# Patient Record
Sex: MALE | Race: WHITE | NOT HISPANIC OR LATINO | Employment: FULL TIME | URBAN - NONMETROPOLITAN AREA
[De-identification: names, ages, dates, MRNs, and addresses within clinical notes are randomized per-mention and may not be internally consistent; named-entity substitution may affect disease eponyms.]

---

## 2018-06-27 LAB
ALBUMIN SERPL-MCNC: 2.2 G/DL (ref 3.5–5.2)
ALP BLD-CCNC: 53 U/L (ref 40–130)
ALT SERPL-CCNC: 17 U/L (ref 5–41)
ANION GAP SERPL CALCULATED.3IONS-SCNC: 15 MMOL/L (ref 7–19)
AST SERPL-CCNC: 24 U/L (ref 5–40)
BASOPHILS ABSOLUTE: 0.2 K/UL (ref 0–0.2)
BASOPHILS RELATIVE PERCENT: 2 % (ref 0–1)
BILIRUB SERPL-MCNC: 0.5 MG/DL (ref 0.2–1.2)
BUN BLDV-MCNC: 16 MG/DL (ref 6–20)
CALCIUM SERPL-MCNC: 8 MG/DL (ref 8.6–10)
CHLORIDE BLD-SCNC: 103 MMOL/L (ref 98–111)
CHOLESTEROL, TOTAL: 302 MG/DL (ref 160–199)
CO2: 23 MMOL/L (ref 22–29)
CREAT SERPL-MCNC: 0.7 MG/DL (ref 0.5–1.2)
EOSINOPHILS ABSOLUTE: 0.6 K/UL (ref 0–0.6)
EOSINOPHILS RELATIVE PERCENT: 7.8 % (ref 0–5)
GFR NON-AFRICAN AMERICAN: >60
GLUCOSE BLD-MCNC: 93 MG/DL (ref 74–109)
HCT VFR BLD CALC: 43 % (ref 42–52)
HDLC SERPL-MCNC: 44 MG/DL (ref 55–121)
HEMOGLOBIN: 13.7 G/DL (ref 14–18)
LDL CHOLESTEROL CALCULATED: 234 MG/DL
LYMPHOCYTES ABSOLUTE: 2.4 K/UL (ref 1.1–4.5)
LYMPHOCYTES RELATIVE PERCENT: 30.2 % (ref 20–40)
MCH RBC QN AUTO: 29.5 PG (ref 27–31)
MCHC RBC AUTO-ENTMCNC: 31.9 G/DL (ref 33–37)
MCV RBC AUTO: 92.7 FL (ref 80–94)
MONOCYTES ABSOLUTE: 0.5 K/UL (ref 0–0.9)
MONOCYTES RELATIVE PERCENT: 5.9 % (ref 0–10)
NEUTROPHILS ABSOLUTE: 4.2 K/UL (ref 1.5–7.5)
NEUTROPHILS RELATIVE PERCENT: 53.8 % (ref 50–65)
PDW BLD-RTO: 13.6 % (ref 11.5–14.5)
PLATELET # BLD: 288 K/UL (ref 130–400)
PMV BLD AUTO: 10.1 FL (ref 9.4–12.4)
POTASSIUM SERPL-SCNC: 3.8 MMOL/L (ref 3.5–5)
PROSTATE SPECIFIC ANTIGEN: 0.55 NG/ML (ref 0–4)
RBC # BLD: 4.64 M/UL (ref 4.7–6.1)
SODIUM BLD-SCNC: 141 MMOL/L (ref 136–145)
TOTAL PROTEIN: 5.7 G/DL (ref 6.6–8.7)
TRIGL SERPL-MCNC: 120 MG/DL (ref 0–149)
TSH SERPL DL<=0.05 MIU/L-ACNC: 10.93 UIU/ML (ref 0.27–4.2)
WBC # BLD: 7.9 K/UL (ref 4.8–10.8)

## 2018-07-30 LAB — TSH SERPL DL<=0.05 MIU/L-ACNC: 5.77 UIU/ML (ref 0.27–4.2)

## 2019-02-19 LAB
ALBUMIN SERPL-MCNC: 2.3 G/DL (ref 3.5–5.2)
ALP BLD-CCNC: 59 U/L (ref 40–130)
ALT SERPL-CCNC: 11 U/L (ref 5–41)
ANION GAP SERPL CALCULATED.3IONS-SCNC: 11 MMOL/L (ref 7–19)
AST SERPL-CCNC: 18 U/L (ref 5–40)
BASOPHILS ABSOLUTE: 0.2 K/UL (ref 0–0.2)
BASOPHILS RELATIVE PERCENT: 2 % (ref 0–1)
BILIRUB SERPL-MCNC: 0.4 MG/DL (ref 0.2–1.2)
BUN BLDV-MCNC: 23 MG/DL (ref 6–20)
CALCIUM SERPL-MCNC: 8.1 MG/DL (ref 8.6–10)
CHLORIDE BLD-SCNC: 106 MMOL/L (ref 98–111)
CHOLESTEROL, TOTAL: 269 MG/DL (ref 160–199)
CO2: 26 MMOL/L (ref 22–29)
CREAT SERPL-MCNC: 1.2 MG/DL (ref 0.5–1.2)
EOSINOPHILS ABSOLUTE: 0.6 K/UL (ref 0–0.6)
EOSINOPHILS RELATIVE PERCENT: 7.5 % (ref 0–5)
GFR NON-AFRICAN AMERICAN: >60
GLUCOSE BLD-MCNC: 103 MG/DL (ref 74–109)
HCT VFR BLD CALC: 34.1 % (ref 42–52)
HDLC SERPL-MCNC: 36 MG/DL (ref 55–121)
HEMOGLOBIN: 10.3 G/DL (ref 14–18)
LDL CHOLESTEROL CALCULATED: 212 MG/DL
LYMPHOCYTES ABSOLUTE: 1.8 K/UL (ref 1.1–4.5)
LYMPHOCYTES RELATIVE PERCENT: 22.8 % (ref 20–40)
MCH RBC QN AUTO: 29.2 PG (ref 27–31)
MCHC RBC AUTO-ENTMCNC: 30.2 G/DL (ref 33–37)
MCV RBC AUTO: 96.6 FL (ref 80–94)
MONOCYTES ABSOLUTE: 0.6 K/UL (ref 0–0.9)
MONOCYTES RELATIVE PERCENT: 7.9 % (ref 0–10)
NEUTROPHILS ABSOLUTE: 4.8 K/UL (ref 1.5–7.5)
NEUTROPHILS RELATIVE PERCENT: 59.4 % (ref 50–65)
PDW BLD-RTO: 13.9 % (ref 11.5–14.5)
PLATELET # BLD: 232 K/UL (ref 130–400)
PMV BLD AUTO: 11.5 FL (ref 9.4–12.4)
POTASSIUM SERPL-SCNC: 4.4 MMOL/L (ref 3.5–5)
RBC # BLD: 3.53 M/UL (ref 4.7–6.1)
SODIUM BLD-SCNC: 143 MMOL/L (ref 136–145)
TOTAL PROTEIN: 5.6 G/DL (ref 6.6–8.7)
TRIGL SERPL-MCNC: 104 MG/DL (ref 0–149)
TSH SERPL DL<=0.05 MIU/L-ACNC: 10.42 UIU/ML (ref 0.27–4.2)
WBC # BLD: 8 K/UL (ref 4.8–10.8)

## 2019-02-20 ENCOUNTER — TRANSCRIBE ORDERS (OUTPATIENT)
Dept: PHYSICAL THERAPY | Facility: HOSPITAL | Age: 59
End: 2019-02-20

## 2019-02-20 DIAGNOSIS — E78.41 ELEVATED LIPOPROTEIN(A): Primary | ICD-10-CM

## 2019-02-25 ENCOUNTER — HOSPITAL ENCOUNTER (OUTPATIENT)
Dept: PHYSICAL THERAPY | Facility: HOSPITAL | Age: 59
Setting detail: THERAPIES SERIES
Discharge: HOME | End: 2019-02-25

## 2019-02-25 DIAGNOSIS — I89.0 LYMPHEDEMA OF BOTH LOWER EXTREMITIES: Primary | ICD-10-CM

## 2019-02-25 PROCEDURE — 97162 PT EVAL MOD COMPLEX 30 MIN: CPT | Performed by: PHYSICAL THERAPIST

## 2019-02-25 NOTE — THERAPY EVALUATION
Physical Therapy Lymphedema Initial Evaluation  [facility]     Patient Name: Beni Fuchs  : 1960  MRN: [id number]  Today's Date: 2019      Visit Date: 2019    Visit Dx:    ICD-10-CM ICD-9-CM   1. Lymphedema of both lower extremities I89.0 457.1       There is no problem list on file for this patient.       Past Medical History:   Diagnosis Date   • Hypertension         Past Surgical History:   Procedure Laterality Date   • BACK SURGERY Right     L1-5 fusion   • JOINT REPLACEMENT Left        Visit Dx:    ICD-10-CM ICD-9-CM   1. Lymphedema of both lower extremities I89.0 457.1       Patient History     Row Name 19 0915             History    Chief Complaint  Swelling;Ulcer, wound or other skin conditions  -HR      Date Current Problem(s) Began  18  -HR      Brief Description of Current Complaint  He began having darkening of the skin on his lower legs and then progressive swelling early last year. He went to the doctor in 2018. He has had blisters and weeping from the legs and has been on antibiotics twice. He has gained about 100 pounds since 2018 without changing his eating habits.   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  general MD  -HR      How has patient tried to help current problem?  compression socks when flying  -HR         Pain     Pain Location  Leg  -HR      Pain at Present  0  -HR      Pain at Best  0  -HR      Pain Description  Other (Comment)  -HR      Pain Comments  He has residual numbness from spinal compression in both legs with the R more involved than the left. He reports having sensations of certain spots on  his L leg burning sometimes, but that is all.   -HR         Fall Risk Assessment    Any falls in the past year:  No  -HR         Services    Prior Rehab/Home Health Experiences  No  -HR      Are you currently receiving Home Health services  No  -HR      Do you plan to receive Home Health services in the near future  No  -HR         Daily Activities    Primary Language  English  -HR      How does patient learn best?  Reading  -HR      Teaching needs identified  Management of Condition  -HR      Barriers to learning  None  -HR      Explanation of Functional Status Problem  He is very limited in forward bending due to L1-5 being fused. He can't reach his lower legs or feet. He uses reachers and sock aids as well as long handled shoe horns and sponges  -HR         Safety    Are you being hurt, hit, or frightened by anyone at home or in your life?  No  -HR      Are you being neglected by a caregiver  No  -HR        User Key  (r) = Recorded By, (t) = Taken By, (c) = Cosigned By    Initials Name Provider Type    HR Alena Galan, PT, DPT, CLT-CHAVA Physical Therapist          Lymphedema     Row Name 02/25/19 0900             Subjective Pain    Able to rate subjective pain?  yes  -HR      Pre-Treatment Pain Level  0  -HR         Subjective Comments    Subjective Comments  He just about can't get the compression socks he has on because he can't bend over and the sock aide doesn't do near as well with the longer socks.   -HR         Lymphedema Assessment    Lymphedema Classification  RLE:;LLE:;stage 3 (Lymphostatic Elephantiasis)  -HR      Lymphedema Precautions  other (comment) completed round of antibiotics 7 days ago  -HR         Posture/Observations    Observations  Edema  -HR         General ROM    Head/Neck/Trunk  Trunk Flexion  -HR      GENERAL ROM COMMENTS  has difficulty with R knee flexion as increased edema has caused soft tissue block to the posterior knees'  -HR         Head/Neck/Trunk    Trunk Flexion  AROM  can reach only to knee level  -HR      Head/Neck/Trunk Comments  has lumbar fusions  -HR         Lymphedema Edema Assessment    Ptting Edema Category  By grade out of 3  -HR      Pitting Edema  + 2/3  -HR      Edema Assessment Comment  still has pitting in the lower legs which decreases towards the feet.  -HR         Skin Changes/Observations    Location/Assessment  Lower Extremity  -HR      Lower Extremity Conditions  bilateral:;scaly;inflamed;weeping;dry  -HR      Lower Extremity Color/Pigment  bilateral:;red;non-blanchable;hyperpigmented;fibrosis;brawny  -HR      Lower Extremity Skin Details  both lower legs had significant redness along the shins and blisters along the upper calf. The L distal thigh also had some blisters present.   -HR      Skin Observations Comment  Thickened, flaky, inflamed  -HR         Lymphedema Sensation    Lymphedema Sensation Reports  RLE:;numbness;LLE:  -HR         Lymphedema Measurements    Measurement Type(s)  Circumferential  -HR      Circumferential Areas  Lower extremities  -HR         BLE Circumferential (cm)    Measurement Location 1  BOT  -HR      Left 1  27 cm  -HR      Right 1  27.3 cm  -HR      Measurement Location 2  +10 from heel base  -HR      Left 2  32.5 cm  -HR      Right 2  32.3 cm  -HR      Measurement Location 3  +10  -HR      Left 3  32.5 cm  -HR      Right 3  34.9 cm  -HR      Measurement Location 4  +10  -HR      Left 4  42.9 cm  -HR      Right 4  47.1 cm  -HR      Measurement Location 5  +10  -HR      Left 5  52.5 cm  -HR      Right 5  54.4 cm  -HR      Measurement Location 6  +10  -HR      Left 6  57.2 cm  -HR      Right 6  58.2 cm  -HR      Measurement Location 7  +10  -HR      Left 7  67.1 cm  -HR      Right 7  66.3 cm  -HR      LLE Circumferential Total  311.7 cm  -HR      RLE Circumferential Total  320.5 cm  -HR         Manual Lymphatic Drainage    Manual Lymphatic Drainage Comments  Discussed this and will use in clinic. he will be limited in what he  can reach to perform any self massage. Will attempt to get him a Flexitouch pump  -HR         Compression/Skin Care    Compression/Skin Care Comments  Gave him info to order [place]arrow lower leg garments.   -HR        User Key  (r) = Recorded By, (t) = Taken By, (c) = Cosigned By    Initials Name Provider Type    HR Alena Galan, PT, DPT, TIA-CHAVA Physical Therapist                          Therapy Education  Education Details: treatment for lymphedema.   Given: Edema management  Program: New  How Provided: Verbal, Demonstration, Written  Provided to: Patient  Level of Understanding: Verbalized      Exercises     Row Name 02/25/19 0900             Subjective Comments    Subjective Comments  He just about can't get the compression socks he has on because he can't bend over and the sock aide doesn't do near as well with the longer socks.   -HR         Subjective Pain    Able to rate subjective pain?  yes  -HR      Pre-Treatment Pain Level  0  -HR        User Key  (r) = Recorded By, (t) = Taken By, (c) = Cosigned By    Initials Name Provider Type    HR Alena Galan, PT, DPT, BRINDA Physical Therapist                        PT OP Goals     Row Name 02/25/19 0915          PT Short Term Goals    STG Date to Achieve  03/11/19  -HR     STG 1  Patient will show a good understanding of the condition of lymphedema and its care.   -HR     STG 1 Progress  New  -HR     STG 2  Patient will have improvement in lower leg skin health with no further blistering.   -HR     STG 2 Progress  New  -HR     STG 3  Patient will be able to perform modified self massage and HEP for BLE lymphedema.  -HR     STG 3 Progress  New  -HR        Long Term Goals    LTG 1  Patient will be able to don and doff compression garments for BLE.  -HR     LTG 1 Progress  New  -HR     LTG 2  Patient will be independent with use of a home lymphedema pump.  -HR     LTG 2 Progress  New  -HR     LTG 3  Patient will have a decrease in size of each LE  by 10 cm or greater.   -HR     LTG 3 Progress  New  -HR     LTG 4  Patient will have no s/s infection.  -HR     LTG 4 Progress  New  -HR        Time Calculation    PT Goal Re-Cert Due Date  03/27/19  -HR       User Key  (r) = Recorded By, (t) = Taken By, (c) = Cosigned By    Initials Name Provider Type    HR Alena Galan, PT, DPT, CLT-CHAVA Physical Therapist          PT Assessment/Plan     Row Name 02/25/19 0915          PT Assessment    Functional Limitations  Decreased safety during functional activities;Limitations in functional capacity and performance;Limitation in home management  -HR     Impairments  Impaired lymphatic circulation;Edema;Impaired flexibility;Integumentary integrity;Impaired venous circulation;Impaired sensory integrity  -HR     Assessment Comments  He has had worsening edema in both legs over the past 9-12 months. He has been diagnosed with cellulitis and treated with antibiotics twice. His legs blister and weep and the skin is very hard and thick. He would benefit from CDT to address the BLE lymphedema. He is unfortunately limited in his ability to reach his lower legs and feet due to a previous lumbar fusion. This will limit his ability to perform self massage as he does live alone. He would be a great candidate for a lymphedema pump so we will pursue this. He is also going to order some inelastic garments for compression of his lower legs. We will monitor his skin condition and watch for signs of infection. He is in agreement with the plan and wants to proceed, Thank you for the referral as I think we can definitely help him.   -HR     Please refer to paper survey for additional self-reported information  Yes  -HR     Rehab Potential  Excellent  -HR     Patient/caregiver participated in establishment of treatment plan and goals  Yes  -HR     Patient would benefit from skilled therapy intervention  Yes  -HR        PT Plan    PT Frequency  2x/week  -HR     Predicted Duration of  Therapy Intervention (Therapy Eval)  6 weeks  -HR     Planned CPT's?  PT MANUAL THERAPY EA 15 MIN: 91375;PT THER PROC EA 15 MIN: 72781  -HR     PT Plan Comments  PT to see for CDT to BLE.  -HR       User Key  (r) = Recorded By, (t) = Taken By, (c) = Cosigned By    Initials Name Provider Type    HR Alena Galan, PT, DPT, CLT-CHAVA Physical Therapist                       Time Calculation:       Therapy Suggested Charges     Code   Minutes Charges    None           Therapy Charges for Today     Code Description Service Date Service Provider Modifiers Qty    41368769026 HC PT EVAL MOD COMPLEXITY 4 2/25/2019 Alena Galan, PT, DPT, CLT-CHAVA GP 1    02292539865 HC PT THER SUPP EA 15 MIN 2/25/2019 Alena Galan, PT, DPT, CLT-CHAVA GP 2                    Alena Galan, PT, DPT, CLT-CHAVA  2/25/2019

## 2019-02-27 ENCOUNTER — HOSPITAL ENCOUNTER (OUTPATIENT)
Dept: PHYSICAL THERAPY | Facility: HOSPITAL | Age: 59
Setting detail: THERAPIES SERIES
Discharge: HOME | End: 2019-02-27

## 2019-02-27 DIAGNOSIS — I89.0 LYMPHEDEMA OF BOTH LOWER EXTREMITIES: Primary | ICD-10-CM

## 2019-02-27 PROCEDURE — 97140 MANUAL THERAPY 1/> REGIONS: CPT | Performed by: PHYSICAL THERAPIST

## 2019-02-27 NOTE — THERAPY TREATMENT NOTE
Outpatient Physical Therapy Lymphedema Treatment Note  [facility]     Patient Name: Beni Fuchs  : 1960  MRN: [id number]  Today's Date: 2019        Visit Date: 2019    Visit Dx:    ICD-10-CM ICD-9-CM   1. Lymphedema of both lower extremities I89.0 457.1       There is no problem list on file for this patient.       Lymphedema     Row Name 19 1000             Subjective Pain    Able to rate subjective pain?  yes  -HR      Pre-Treatment Pain Level  1  -HR      Subjective Pain Comment  couple burning spots on R lateral leg  -HR         Subjective Comments    Subjective Comments  He ordered the compression and got some aquaphor.  -HR         Manual Lymphatic Drainage    Manual Lymphatic Drainage  initial sequence;opened regional lymph nodes;opened anastamoses;extremity treatment  -HR      Initial Sequence  short neck;abdomen;diaphragmatic breathing  -HR      Short Neck  had him practice hand placement  -HR      Diaphragmatic Breathing  X 5 with some manual resistance  -HR      Opened Regional Lymph Nodes  axillary;inguinal  -HR      Axillary  right;left  -HR      Axillary Comment  instructed him on self massage  -HR      Inguinal  right;left  -HR      Opened Anastamoses  inguino-axillary  -HR      Inguino-Axillary  right;left  -HR      Extremity Treatment  MLD to full limb  -HR      MLD to Full Limb  BLE  -HR      Manual Lymphatic Drainage Comments  both thighs are very dense and tight as well as the lower legs. Told him to get some compression pants that come past the knees to wear in conjunction with the lower leg garments that he has already ordered.   -HR         Compression/Skin Care    Compression/Skin Care Comments  He is going to look into getting compression pants and is waiting on his farrow garments to be delivered.   -HR        User Key  (r) = Recorded By, (t) = Taken By, (c) = Cosigned By    Initials Name Provider Type    HR Alena Galan, PT, DPT, CLT-CHAVA  Physical Therapist                        PT Assessment/Plan     Row Name 02/27/19 1000          PT Assessment    Assessment Comments  Initiated MLD today for BLEs. His thighs are more involved than I realized at evaluation and will need some compression as well. He has already ordered the garments for his lower legs and we discussed options for upper legs as well.   -HR        PT Plan    PT Plan Comments  Cont POC.   -HR       User Key  (r) = Recorded By, (t) = Taken By, (c) = Cosigned By    Initials Name Provider Type    Alena Shaw, PT, DPT, CLT-CHAVA Physical Therapist               Exercises     Row Name 02/27/19 1000             Subjective Comments    Subjective Comments  He ordered the compression and got some aquaphor.  -HR         Subjective Pain    Able to rate subjective pain?  yes  -HR      Pre-Treatment Pain Level  1  -HR      Subjective Pain Comment  couple burning spots on R lateral leg  -HR        User Key  (r) = Recorded By, (t) = Taken By, (c) = Cosigned By    Initials Name Provider Type    HR Alena Galan, PT, DPT, CLT-CHAVA Physical Therapist                        PT OP Goals     Row Name 02/27/19 1000          PT Short Term Goals    STG Date to Achieve  03/11/19  -HR     STG 1  Patient will show a good understanding of the condition of lymphedema and its care.   -HR     STG 1 Progress  Ongoing  -HR     STG 2  Patient will have improvement in lower leg skin health with no further blistering.   -HR     STG 2 Progress  Ongoing  -HR     STG 3  Patient will be able to perform modified self massage and HEP for BLE lymphedema.  -HR     STG 3 Progress  Ongoing  -HR        Long Term Goals    LTG 1  Patient will be able to don and doff compression garments for BLE.  -HR     LTG 1 Progress  New  -HR     LTG 2  Patient will be independent with use of a home lymphedema pump.  -HR     LTG 2 Progress  New  -HR     LTG 3  Patient will have a decrease in size of each LE by 10 cm or greater.    -HR     LTG 3 Progress  New  -HR     LTG 4  Patient will have no s/s infection.  -HR     LTG 4 Progress  New  -HR        Time Calculation    PT Goal Re-Cert Due Date  03/27/19  -HR       User Key  (r) = Recorded By, (t) = Taken By, (c) = Cosigned By    Initials Name Provider Type    HR Alena Galan, PT, DPT, CLT-CHAVA Physical Therapist          Therapy Education  Education Details: instructed him on self massage to trunk and what he can reach of LEs  Given: Edema management  Program: New  How Provided: Verbal, Demonstration  Provided to: Patient  Level of Understanding: Verbalized, Demonstrated              Time Calculation:       Therapy Suggested Charges     Code   Minutes Charges    None           Therapy Charges for Today     Code Description Service Date Service Provider Modifiers Qty    91953085935 HC PT MANUAL THERAPY EA 15 MIN 2/27/2019 Alena Galan, PT, DPT, CLSAMMY-CHAVA GP 5                    Alena Galan, PT, DPT, CLYEE  2/27/2019

## 2019-03-04 ENCOUNTER — HOSPITAL ENCOUNTER (OUTPATIENT)
Dept: PHYSICAL THERAPY | Facility: HOSPITAL | Age: 59
Setting detail: THERAPIES SERIES
Discharge: HOME | End: 2019-03-04

## 2019-03-04 DIAGNOSIS — I89.0 LYMPHEDEMA OF BOTH LOWER EXTREMITIES: Primary | ICD-10-CM

## 2019-03-04 PROCEDURE — 97140 MANUAL THERAPY 1/> REGIONS: CPT

## 2019-03-04 NOTE — THERAPY TREATMENT NOTE
Outpatient Physical Therapy Lymphedema Treatment Note  [facility]     Patient Name: Beni Fuchs  : 1960  MRN: [id number]  Today's Date: 3/4/2019        Visit Date: 2019    Visit Dx:    ICD-10-CM ICD-9-CM   1. Lymphedema of both lower extremities I89.0 457.1       There is no problem list on file for this patient.       Lymphedema     Row Name 19 0905             Subjective Pain    Able to rate subjective pain?  yes  -AL      Pre-Treatment Pain Level  0  -AL         Subjective Comments    Subjective Comments  He will occasionally have a burning sensation in both lets.  He can't get the inelastic garments on.  He is going to order some compression capris, he has compression shorts right now.  They fit but they don't go to the knee.  He was thinking about giving away the inelastic garments becasue he can't put them on.   -AL         Manual Lymphatic Drainage    Manual Lymphatic Drainage  initial sequence;opened regional lymph nodes;opened anastamoses;extremity treatment  -AL      Initial Sequence  short neck;abdomen;diaphragmatic breathing  -AL      Short Neck  had him practice hand placement  -AL      Diaphragmatic Breathing  x 10 with superficial abdominals.  -AL      Opened Regional Lymph Nodes  axillary;inguinal  -AL      Axillary  right;left  -AL      Inguinal  right;left  -AL      Opened Anastamoses  inguino-axillary  -AL      Inguino-Axillary  right;left  -AL      Extremity Treatment  MLD to full limb  -AL      MLD to Full Limb  BLE  -AL      Manual Lymphatic Drainage Comments  Extra time spent on the upper legs and calf areas.    -AL         Compression/Skin Care    Compression/Skin Care Comments  He will bring his inelastic garments and compression capris or shorts next treatment.   -AL        User Key  (r) = Recorded By, (t) = Taken By, (c) = Cosigned By    Initials Name Provider Type    Jud Olvera, PTA Physical Therapy Assistant                        PT Assessment/Plan      Row Name 03/04/19 0905          PT Assessment    Assessment Comments  He still has dense areas present in both upper and lower legs.  He was going to give the inelastic garments away since he can't don them himself, but I want him to bring them to his next treatment so I can put them  on for him.  I am hoping as we go through treatment he will be able to don and doff the inealstic garments.   -AL        PT Plan    PT Plan Comments  Will continue with POC.  -AL       User Key  (r) = Recorded By, (t) = Taken By, (c) = Cosigned By    Initials Name Provider Type    Jud Olvera PTA Physical Therapy Assistant               Exercises     Row Name 03/04/19 0905             Subjective Comments    Subjective Comments  He will occasionally have a burning sensation in both lets.  He can't get the inelastic garments on.  He is going to order some compression capris, he has compression shorts right now.  They fit but they don't go to the knee.  He was thinking about giving away the inelastic garments becasue he can't put them on.   -AL         Subjective Pain    Able to rate subjective pain?  yes  -AL      Pre-Treatment Pain Level  0  -AL        User Key  (r) = Recorded By, (t) = Taken By, (c) = Cosigned By    Initials Name Provider Type    Jud Olvera PTA Physical Therapy Assistant                        PT OP Goals     Row Name 03/04/19 0905          PT Short Term Goals    STG Date to Achieve  03/11/19  -AL     STG 1  Patient will show a good understanding of the condition of lymphedema and its care.   -AL     STG 1 Progress  Ongoing  -AL     STG 1 Progress Comments  Continue to educate  -AL     STG 2  Patient will have improvement in lower leg skin health with no further blistering.   -AL     STG 2 Progress  Ongoing  -AL     STG 3  Patient will be able to perform modified self massage and HEP for BLE lymphedema.  -AL     STG 3 Progress  Ongoing  -AL     STG 3 Progress Comments  He is working on modified self  MLD  -AL        Long Term Goals    LTG 1  Patient will be able to don and doff compression garments for BLE.  -AL     LTG 1 Progress  Ongoing  -AL     LTG 1 Progress Comments  He is not able to don the compression, he will bring his garments next treatment so I can put them on him.   -AL     LTG 2  Patient will be independent with use of a home lymphedema pump.  -AL     LTG 2 Progress  Ongoing  -AL     LTG 2 Progress Comments  He has a pump demo scheduled this week.   -AL     LTG 3  Patient will have a decrease in size of each LE by 10 cm or greater.   -AL     LTG 3 Progress  New  -AL     LTG 4  Patient will have no s/s infection.  -AL     LTG 4 Progress  New  -AL        Time Calculation    PT Goal Re-Cert Due Date  03/27/19  -AL       User Key  (r) = Recorded By, (t) = Taken By, (c) = Cosigned By    Initials Name Provider Type    Jud Olvera PTA Physical Therapy Assistant          Therapy Education  Education Details: Work on self MLD and HEP  Given: Edema management  Program: Reinforced, New  How Provided: Verbal, Demonstration, Written  Provided to: Patient  Level of Understanding: Verbalized              Time Calculation:   Start Time: 0905  Stop Time: 1025  Time Calculation (min): 80 min  Total Timed Code Minutes- PT: 80 minute(s)   Therapy Suggested Charges     Code   Minutes Charges    None           Therapy Charges for Today     Code Description Service Date Service Provider Modifiers Qty    03214828212 HC PT MANUAL THERAPY EA 15 MIN 3/4/2019 Jud Ojeda PTA GP 5                    Jud Ojeda PTA  3/4/2019

## 2019-03-11 ENCOUNTER — HOSPITAL ENCOUNTER (OUTPATIENT)
Dept: PHYSICAL THERAPY | Facility: HOSPITAL | Age: 59
Setting detail: THERAPIES SERIES
Discharge: HOME | End: 2019-03-11

## 2019-03-11 DIAGNOSIS — I89.0 LYMPHEDEMA OF BOTH LOWER EXTREMITIES: Primary | ICD-10-CM

## 2019-03-11 PROCEDURE — 97140 MANUAL THERAPY 1/> REGIONS: CPT | Performed by: PHYSICAL THERAPIST

## 2019-03-11 NOTE — THERAPY TREATMENT NOTE
Outpatient Physical Therapy Lymphedema Treatment Note  [facility]     Patient Name: Beni Fuchs  : 1960  MRN: [id number]  Today's Date: 3/11/2019        Visit Date: 2019    Visit Dx:    ICD-10-CM ICD-9-CM   1. Lymphedema of both lower extremities I89.0 457.1       There is no problem list on file for this patient.       Lymphedema     Row Name 19 0800             Subjective Pain    Able to rate subjective pain?  yes  -HR      Pre-Treatment Pain Level  2  -HR         Subjective Comments    Subjective Comments  His hips are really bothering him for some reason today. He also is sure he could get somebody at work to help him with the farrow garments if he can't get them on himself.   -HR         Manual Lymphatic Drainage    Manual Lymphatic Drainage  initial sequence;opened regional lymph nodes;opened anastamoses;extremity treatment  -HR      Initial Sequence  short neck;abdomen;diaphragmatic breathing  -HR      Short Neck  --  -HR      Diaphragmatic Breathing  x 10 with superficial abdominals.  -HR      Opened Regional Lymph Nodes  axillary;inguinal  -HR      Axillary  right;left  -HR      Inguinal  right;left  -HR      Opened Anastamoses  inguino-axillary  -HR      Inguino-Axillary  right;left  -HR      Extremity Treatment  MLD to full limb  -HR      MLD to Full Limb  BLE  -HR      Manual Lymphatic Drainage Comments  used double towel roll under each knee to perform MLD to posterior calf and distal thigh  -HR         Compression/Skin Care    Compression/Skin Care  skin care;compression garment  -HR      Skin Care  lotion applied  -HR      Compression Garment Comments  Donned the hybrid socks and Farrow Basic garments to each lower leg.   -HR      Compression/Skin Care Comments  Had him watch and ask any questions as I placed each basic garment.  -HR        User Key  (r) = Recorded By, (t) = Taken By, (c) = Cosigned By    Initials Name Provider Type    HR Alena Galan, PT, DPT,  BRINDA Physical Therapist                        PT Assessment/Plan     Row Name 03/11/19 0800          PT Assessment    Assessment Comments  He brought the Farrow basic garments today so I was able to put these on him after MLD. They felt very comfortable to him. Will see how his legs look and measure at his visit Wednesday.   -HR        PT Plan    PT Plan Comments  Cont POC. Measure next visit.   -HR       User Key  (r) = Recorded By, (t) = Taken By, (c) = Cosigned By    Initials Name Provider Type    HR Alena Galan, PT, DPT, BRINDA Physical Therapist               Exercises     Row Name 03/11/19 0800             Subjective Comments    Subjective Comments  His hips are really bothering him for some reason today. He also is sure he could get somebody at work to help him with the farrow garments if he can't get them on himself.   -HR         Subjective Pain    Able to rate subjective pain?  yes  -HR      Pre-Treatment Pain Level  2  -HR        User Key  (r) = Recorded By, (t) = Taken By, (c) = Cosigned By    Initials Name Provider Type    HR Alena Galan, PT, DPT, BRINDA Physical Therapist                        PT OP Goals     Row Name 03/11/19 0800          PT Short Term Goals    STG Date to Achieve  03/11/19  -HR     STG 1  Patient will show a good understanding of the condition of lymphedema and its care.   -HR     STG 1 Progress  Ongoing  -HR     STG 2  Patient will have improvement in lower leg skin health with no further blistering.   -HR     STG 2 Progress  Ongoing  -HR     STG 2 Progress Comments  had a little weeping from open blister on R lateral calf  -HR     STG 3  Patient will be able to perform modified self massage and HEP for BLE lymphedema.  -HR     STG 3 Progress  Ongoing  -HR     STG 3 Progress Comments  working on this.  -HR        Long Term Goals    LTG 1  Patient will be able to don and doff compression garments for BLE.  -HR     LTG 1 Progress  Ongoing  -HR     LTG 2   Patient will be independent with use of a home lymphedema pump.  -HR     LTG 2 Progress  Ongoing  -HR     LTG 3  Patient will have a decrease in size of each LE by 10 cm or greater.   -HR     LTG 3 Progress  Ongoing  -HR     LTG 3 Progress Comments  Will measure next visit to assess any improvement with use of the compression garments  -HR     LTG 4  Patient will have no s/s infection.  -HR     LTG 4 Progress  Ongoing  -HR     LTG 4 Progress Comments  Did have some blisters that had opened but no odor or signs of infection  -HR        Time Calculation    PT Goal Re-Cert Due Date  03/27/19  -HR       User Key  (r) = Recorded By, (t) = Taken By, (c) = Cosigned By    Initials Name Provider Type    HR Alena Galan, PT, DPT, BRINDA Physical Therapist          Therapy Education  Education Details: reviewed how to apply the farrow garments  Given: Bandaging/dressing change  Program: New  How Provided: Demonstration, Verbal  Provided to: Patient  Level of Understanding: Verbalized              Time Calculation:       Therapy Suggested Charges     Code   Minutes Charges    None           Therapy Charges for Today     Code Description Service Date Service Provider Modifiers Qty    52878520517  PT MANUAL THERAPY EA 15 MIN 3/11/2019 Alena Galan, PT, DPT, BRINDA GP 7                    Alena Galan PT, DPT, BRINDA  3/11/2019

## 2019-03-13 ENCOUNTER — HOSPITAL ENCOUNTER (OUTPATIENT)
Dept: PHYSICAL THERAPY | Facility: HOSPITAL | Age: 59
Setting detail: THERAPIES SERIES
Discharge: HOME | End: 2019-03-13

## 2019-03-13 DIAGNOSIS — I89.0 LYMPHEDEMA OF BOTH LOWER EXTREMITIES: Primary | ICD-10-CM

## 2019-03-13 PROCEDURE — 97140 MANUAL THERAPY 1/> REGIONS: CPT

## 2019-03-13 NOTE — THERAPY TREATMENT NOTE
Outpatient Physical Therapy Lymphedema Treatment Note  [facility]     Patient Name: Beni Fuchs  : 1960  MRN: [id number]  Today's Date: 3/13/2019        Visit Date: 2019    Visit Dx:    ICD-10-CM ICD-9-CM   1. Lymphedema of both lower extremities I89.0 457.1       There is no problem list on file for this patient.       Lymphedema     Row Name 19 0800             Subjective Pain    Able to rate subjective pain?  yes  -AL      Pre-Treatment Pain Level  0  -AL         Subjective Comments    Subjective Comments  He is feeling better today because it is warmer.  His legs looked about the same after he took the garments off, he took them off yesterday morning.  He has not had them of since then.  He used his grabber to get the garments off.  He has people lined up when he goes back to work to help him.  He will be off work a little longer.  He is still waiting to hear from [employer] about the pump.  -AL         Lymphedema Measurements    Measurement Type(s)  Circumferential  -AL      Circumferential Areas  Lower extremities  -AL         BLE Circumferential (cm)    Measurement Location 1  BOT  -AL      Left 1  26.7 cm  -AL      Right 1  25.3 cm  -AL      Measurement Location 2  +10 from heel base  -AL      Left 2  33.4 cm  -AL      Right 2  33.5 cm  -AL      Measurement Location 3  +10  -AL      Left 3  34.2 cm  -AL      Right 3  32.8 cm  -AL      Measurement Location 4  +10  -AL      Left 4  46.4 cm  -AL      Right 4  46.1 cm  -AL      Measurement Location 5  +10  -AL      Left 5  55.5 cm  -AL      Right 5  52.7 cm  -AL      Measurement Location 6  +10  -AL      Left 6  56.6 cm  -AL      Right 6  56.2 cm  -AL      Measurement Location 7  +10  -AL      Left 7  68.4 cm  -AL      Right 7  69.4 cm  -AL      LLE Circumferential Total  321.2 cm  -AL      RLE Circumferential Total  316 cm  -AL         Manual Lymphatic Drainage    Manual Lymphatic Drainage  initial sequence;opened regional  lymph nodes;opened anastamoses;extremity treatment  -AL      Initial Sequence  short neck;abdomen;diaphragmatic breathing  -AL      Diaphragmatic Breathing  x 10 with superficial abdominals.  -AL      Opened Regional Lymph Nodes  axillary;inguinal  -AL      Axillary  right;left  -AL      Inguinal  right;left  -AL      Opened Anastamoses  inguino-axillary  -AL      Inguino-Axillary  right;left  -AL      Extremity Treatment  MLD to full limb  -AL      MLD to Full Limb  BLE  -AL      Manual Lymphatic Drainage Comments  used double towel roll under each knee to perform MLD to posterior calf and distal thigh  -AL         Compression/Skin Care    Compression/Skin Care  skin care;compression garment  -AL      Skin Care  lotion applied  -AL      Compression Garment Comments  Donned the hybrid socks and Farrow Basic garments to each lower leg.   -AL        User Key  (r) = Recorded By, (t) = Taken By, (c) = Cosigned By    Initials Name Provider Type    Jud Olvera PTA Physical Therapy Assistant                        PT Assessment/Plan     Row Name 03/13/19 0800          PT Assessment    Assessment Comments  He likes the Farrow Basic garments for the lower legs, he is not able to put them on himself.  He will have help when he goes back to work, as he has friends that will help him don the garments.  He has had an increase in size in the L LE, a decrease in size R LE.  He still has dense tissue present each upper leg.  He is working on the MLD to just below the knees.  I am hopeful he will be able to get the Flexitouch pump to help further move the fluid out of the legs.   -AL        PT Plan    PT Plan Comments  Will continue with CDT.  -AL       User Key  (r) = Recorded By, (t) = Taken By, (c) = Cosigned By    Initials Name Provider Type    Jud Olvera PTA Physical Therapy Assistant               Exercises     Row Name 03/13/19 0800             Subjective Comments    Subjective Comments  He is feeling better  today because it is warmer.  His legs looked about the same after he took the garments off, he took them off yesterday morning.  He has not had them of since then.  He used his grabber to get the garments off.  He has people lined up when he goes back to work to help him.  He will be off work a little longer.  He is still waiting to hear from [employer] about the pump.  -AL         Subjective Pain    Able to rate subjective pain?  yes  -AL      Pre-Treatment Pain Level  0  -AL        User Key  (r) = Recorded By, (t) = Taken By, (c) = Cosigned By    Initials Name Provider Type    Jud Olvera, PTA Physical Therapy Assistant                        PT OP Goals     Row Name 03/13/19 0800          PT Short Term Goals    STG Date to Achieve  03/11/19  -AL     STG 1  Patient will show a good understanding of the condition of lymphedema and its care.   -AL     STG 1 Progress  Ongoing  -AL     STG 1 Progress Comments  Improving  -AL     STG 2  Patient will have improvement in lower leg skin health with no further blistering.   -AL     STG 2 Progress  Ongoing  -AL     STG 3  Patient will be able to perform modified self massage and HEP for BLE lymphedema.  -AL     STG 3 Progress  Ongoing  -AL     STG 3 Progress Comments  He is working on the MLD to just below the knee, he can't reach further than this  -AL        Long Term Goals    LTG 1  Patient will be able to don and doff compression garments for BLE.  -AL     LTG 1 Progress  Ongoing  -AL     LTG 1 Progress Comments  He is able to doff the compression, he will need help donning the compression.  -AL     LTG 2  Patient will be independent with use of a home lymphedema pump.  -AL     LTG 2 Progress  Ongoing  -AL     LTG 2 Progress Comments  Waiting for approval from insurance.  -AL     LTG 3  Patient will have a decrease in size of each LE by 10 cm or greater.   -AL     LTG 3 Progress  Ongoing  -AL     LTG 4  Patient will have no s/s infection.  -AL     LTG 4  Progress  Ongoing  -AL        Time Calculation    PT Goal Re-Cert Due Date  03/27/19  -AL       User Key  (r) = Recorded By, (t) = Taken By, (c) = Cosigned By    Initials Name Provider Type    Jud Olvera PTA Physical Therapy Assistant          Therapy Education  Education Details: Continue with CDT.  Given: Edema management  Program: Reinforced  How Provided: Verbal  Provided to: Patient  Level of Understanding: Verbalized              Time Calculation:   Start Time: 0800  Stop Time: 0925  Time Calculation (min): 85 min  Total Timed Code Minutes- PT: 85 minute(s)   Therapy Suggested Charges     Code   Minutes Charges    None           Therapy Charges for Today     Code Description Service Date Service Provider Modifiers Qty    90973752936 HC PT MANUAL THERAPY EA 15 MIN 3/13/2019 Jud Ojeda PTA GP 6                    Jud Ojeda PTA  3/13/2019

## 2019-03-18 ENCOUNTER — HOSPITAL ENCOUNTER (OUTPATIENT)
Dept: PHYSICAL THERAPY | Facility: HOSPITAL | Age: 59
Setting detail: THERAPIES SERIES
Discharge: HOME | End: 2019-03-18

## 2019-03-18 DIAGNOSIS — I89.0 LYMPHEDEMA OF BOTH LOWER EXTREMITIES: Primary | ICD-10-CM

## 2019-03-18 PROCEDURE — 97140 MANUAL THERAPY 1/> REGIONS: CPT

## 2019-03-18 NOTE — THERAPY TREATMENT NOTE
Outpatient Physical Therapy Lymphedema Treatment Note  [facility]     Patient Name: Beni Fuchs  : 1960  MRN: [id number]  Today's Date: 3/18/2019        Visit Date: 2019    Visit Dx:    ICD-10-CM ICD-9-CM   1. Lymphedema of both lower extremities I89.0 457.1       There is no problem list on file for this patient.       Lymphedema     Row Name 19 0755             Subjective Pain    Able to rate subjective pain?  yes  -AL      Pre-Treatment Pain Level  2  -AL         Subjective Comments    Subjective Comments  His Flexitouch should arrive at his home today.  He will go back to work tomorrow night.  No pain today.  -AL         Manual Lymphatic Drainage    Manual Lymphatic Drainage  initial sequence;opened regional lymph nodes;opened anastamoses;extremity treatment  -AL      Initial Sequence  short neck;abdomen;diaphragmatic breathing  -AL      Short Neck  had him practice hand placement  -AL      Diaphragmatic Breathing  x 10 with superficial abdominals.  -AL      Opened Regional Lymph Nodes  axillary;inguinal  -AL      Axillary  right;left  -AL      Inguinal  right;left  -AL      Opened Anastamoses  inguino-axillary  -AL      Inguino-Axillary  right;left  -AL      Extremity Treatment  MLD to full limb  -AL      MLD to Full Limb  BLE  -AL         Compression/Skin Care    Compression/Skin Care  skin care  -AL      Skin Care  lotion applied Hydorphor  -AL      Compression Garment Comments  Donned the hybrid socks and Farrow Basic garments to each lower leg.   -AL        User Key  (r) = Recorded By, (t) = Taken By, (c) = Cosigned By    Initials Name Provider Type    Jud Olvera, PTA Physical Therapy Assistant                        PT Assessment/Plan     Row Name 19 0755          PT Assessment    Assessment Comments  He will have his pump today, he does not know when he will be trained on it.  He goes back to work tomorrow night, we will see him for treatment Wednesday.  Will  measure Wednesday to see if there has been any change in the size of his legs.    -AL        PT Plan    PT Plan Comments  Continue with CDT.   -AL       User Key  (r) = Recorded By, (t) = Taken By, (c) = Cosigned By    Initials Name Provider Type    Jud Olvera PTA Physical Therapy Assistant               Exercises     Row Name 03/18/19 0755             Subjective Comments    Subjective Comments  His Flexitouch should arrive at his home today.  He will go back to work tomorrow night.  No pain today.  -AL         Subjective Pain    Able to rate subjective pain?  yes  -AL      Pre-Treatment Pain Level  2  -AL        User Key  (r) = Recorded By, (t) = Taken By, (c) = Cosigned By    Initials Name Provider Type    Jud Olvera PTA Physical Therapy Assistant                        PT OP Goals     Row Name 03/18/19 0755          PT Short Term Goals    STG Date to Achieve  03/11/19  -AL     STG 1  Patient will show a good understanding of the condition of lymphedema and its care.   -AL     STG 1 Progress  Ongoing  -AL     STG 1 Progress Comments  Continues to improve  -AL     STG 2  Patient will have improvement in lower leg skin health with no further blistering.   -AL     STG 2 Progress  Ongoing  -AL     STG 2 Progress Comments  Do drainage from legs today  -AL     STG 3  Patient will be able to perform modified self massage and HEP for BLE lymphedema.  -AL     STG 3 Progress  Ongoing  -AL     STG 3 Progress Comments  He is reaching what he can with the MLD.  -AL        Long Term Goals    LTG 1  Patient will be able to don and doff compression garments for BLE.  -AL     LTG 1 Progress  Ongoing  -AL     LTG 2  Patient will be independent with use of a home lymphedema pump.  -AL     LTG 2 Progress  Ongoing  -AL     LTG 2 Progress Comments  His pump will arrive at his home today.  -AL     LTG 3  Patient will have a decrease in size of each LE by 10 cm or greater.   -AL     LTG 3 Progress  Ongoing  -AL     LTG 4   Patient will have no s/s infection.  -AL     LTG 4 Progress  Ongoing  -AL        Time Calculation    PT Goal Re-Cert Due Date  03/27/19  -AL       User Key  (r) = Recorded By, (t) = Taken By, (c) = Cosigned By    Initials Name Provider Type    Jud Olvera PTA Physical Therapy Assistant          Therapy Education  Education Details: Continue with CDT, start using pump once he is trained.  Given: Mobility training  Program: New, Reinforced  How Provided: Verbal  Provided to: Patient  Level of Understanding: Verbalized              Time Calculation:   Start Time: 0755  Stop Time: 0920  Time Calculation (min): 85 min  Total Timed Code Minutes- PT: 85 minute(s)   Therapy Suggested Charges     Code   Minutes Charges    None           Therapy Charges for Today     Code Description Service Date Service Provider Modifiers Qty    66707774977 HC PT MANUAL THERAPY EA 15 MIN 3/18/2019 Jud Ojeda PTA GP 6                    Jud Ojeda PTA  3/18/2019

## 2019-03-20 ENCOUNTER — HOSPITAL ENCOUNTER (OUTPATIENT)
Dept: PHYSICAL THERAPY | Facility: HOSPITAL | Age: 59
Setting detail: THERAPIES SERIES
Discharge: HOME | End: 2019-03-20

## 2019-03-20 DIAGNOSIS — I89.0 LYMPHEDEMA OF BOTH LOWER EXTREMITIES: Primary | ICD-10-CM

## 2019-03-20 PROCEDURE — 97140 MANUAL THERAPY 1/> REGIONS: CPT

## 2019-03-20 NOTE — THERAPY TREATMENT NOTE
Outpatient Physical Therapy Lymphedema Treatment Note  [facility]     Patient Name: Beni Fuchs  : 1960  MRN: [id number]  Today's Date: 3/20/2019        Visit Date: 2019    Visit Dx:    ICD-10-CM ICD-9-CM   1. Lymphedema of both lower extremities I89.0 457.1       There is no problem list on file for this patient.       Lymphedema     Row Name 19 0800             Subjective Pain    Able to rate subjective pain?  yes  -AL      Pre-Treatment Pain Level  0  -AL         Subjective Comments    Subjective Comments  He states he wore the compression yesterday at work.  He had a friend at work put the compression on for him.  They were loose by the end of the night when they removed the compression.  He did not have the pain he nomally has in the legs with working.  The  is coming tomorrow to trian him on the Flexitouch.  His BP was 196/98 yesterday.  He is on more BP now, as he went to the Dr. yesterday.    -AL         Skin Changes/Observations    Skin Observations Comment  Has a 1 cm  1 cm blister on the R shin.  See Media tab for photo of the R lower leg.   -AL         Lymphedema Measurements    Measurement Type(s)  Circumferential  -AL      Circumferential Areas  Lower extremities  -AL         BLE Circumferential (cm)    Measurement Location 1  BOT  -AL      Left 1  26.5 cm  -AL      Right 1  26.5 cm  -AL      Measurement Location 2  +10 from heel base  -AL      Left 2  33.4 cm  -AL      Right 2  33.1 cm  -AL      Measurement Location 3  +10  -AL      Left 3  34.5 cm  -AL      Right 3  33.9 cm  -AL      Measurement Location 4  +10  -AL      Left 4  46.5 cm  -AL      Right 4  46.5 cm  -AL      Measurement Location 5  +10  -AL      Left 5  53.2 cm  -AL      Right 5  53 cm  -AL      Measurement Location 6  +10  -AL      Left 6  56.5 cm  -AL      Right 6  59.2 cm  -AL      Measurement Location 7  +10  -AL      Left 7  68.2 cm  -AL      Right 7  69.2 cm  -AL      LLE Circumferential  Total  318.8 cm  -AL      RLE Circumferential Total  321.4 cm  -AL         Manual Lymphatic Drainage    Manual Lymphatic Drainage  initial sequence;opened regional lymph nodes;opened anastamoses;extremity treatment  -AL      Initial Sequence  short neck;abdomen;diaphragmatic breathing  -AL      Short Neck  had him practice hand placement  -AL      Diaphragmatic Breathing  x 9 with superficial abdominals.  -AL      Opened Regional Lymph Nodes  axillary;inguinal  -AL      Axillary  right;left  -AL      Inguinal  right;left  -AL      Opened Anastamoses  inguino-axillary  -AL      Inguino-Axillary  right;left  -AL      Extremity Treatment  MLD to full limb  -AL      MLD to Full Limb  BLE  -AL      Manual Lymphatic Drainage Comments  used double towel roll under each knee to perform MLD to posterior calf and distal thigh  -AL         Compression/Skin Care    Compression/Skin Care  skin care  -AL      Skin Care  lotion applied Hydorphor  -AL      Compression Garment Comments  Donned the hybrid socks and Farrow Basic garments to each lower leg.   -AL      Compression/Skin Care Comments  I did apply a small Optiofoam dressing to the R shin over the blister.   -AL        User Key  (r) = Recorded By, (t) = Taken By, (c) = Cosigned By    Initials Name Provider Type    Jud Olvera PTA Physical Therapy Assistant                    [unfilled]    PT Assessment/Plan     Row Name 03/20/19 0800          PT Assessment    Assessment Comments  He has had an increasein size in the R LE since last week.  He will be trained on the pump tomorrow, he will have his friend help him with compression when he works.  He has a small blister R shin today.  Will measure the legs next treatment.   -AL        PT Plan    PT Plan Comments  Continue with CDT.   -AL       User Key  (r) = Recorded By, (t) = Taken By, (c) = Cosigned By    Initials Name Provider Type    Jud Olvera PTA Physical Therapy Assistant             Exercises      Row Name 03/20/19 0800             Subjective Comments    Subjective Comments  He states he wore the compression yesterday at work.  He had a friend at work put the compression on for him.  They were loose by the end of the night when they removed the compression.  He did not have the pain he nomally has in the legs with working.  The  is coming tomorrow to trian him on the Flexitouch.  His BP was 196/98 yesterday.  He is on more BP now, as he went to the Dr. yesterday.    -AL         Subjective Pain    Able to rate subjective pain?  yes  -AL      Pre-Treatment Pain Level  0  -AL        User Key  (r) = Recorded By, (t) = Taken By, (c) = Cosigned By    Initials Name Provider Type    Jud Olvera, PTA Physical Therapy Assistant                      PT OP Goals     Row Name 03/20/19 0800          PT Short Term Goals    STG Date to Achieve  03/11/19  -AL     STG 1  Patient will show a good understanding of the condition of lymphedema and its care.   -AL     STG 1 Progress  Ongoing  -AL     STG 1 Progress Comments  Improving  -AL     STG 2  Patient will have improvement in lower leg skin health with no further blistering.   -AL     STG 2 Progress  Ongoing  -AL     STG 2 Progress Comments  He has a 1 cm x 1 cm blister R shin  -AL     STG 3  Patient will be able to perform modified self massage and HEP for BLE lymphedema.  -AL     STG 3 Progress  Ongoing  -AL     STG 3 Progress Comments  He does the MLD to areas he can reach.  -AL        Long Term Goals    LTG 1  Patient will be able to don and doff compression garments for BLE.  -AL     LTG 1 Progress  Ongoing  -AL     LTG 1 Progress Comments  He has a friend helping with donning/doffing the compression  -AL     LTG 2  Patient will be independent with use of a home lymphedema pump.  -AL     LTG 2 Progress  Ongoing  -AL     LTG 2 Progress Comments  He will be trained on the pump tomorrow  -AL     LTG 3  Patient will have a decrease in size of each LE by 10 cm  or greater.   -AL     LTG 3 Progress  Ongoing  -AL     LTG 4  Patient will have no s/s infection.  -AL     LTG 4 Progress  Ongoing  -AL        Time Calculation    PT Goal Re-Cert Due Date  03/27/19  -AL       User Key  (r) = Recorded By, (t) = Taken By, (c) = Cosigned By    Initials Name Provider Type    Jud Olvera PTA Physical Therapy Assistant          Therapy Education  Education Details: Continue with CDT, tighten the Farrow Basic garments as needed when working, start using the Flexitouch once he is trained on it.   Given: Edema management  Program: Reinforced  How Provided: Verbal  Provided to: Patient  Level of Understanding: Verbalized              Time Calculation:   Start Time: 0800  Stop Time: 0925  Time Calculation (min): 85 min  Total Timed Code Minutes- PT: 85 minute(s)   Therapy Charges for Today     Code Description Service Date Service Provider Modifiers Qty    [id number] HC PT MANUAL THERAPY EA 15 MIN 3/20/2019 Jud Ojeda PTA GP 6                    Jud Ojeda PTA  3/20/2019

## 2019-03-25 ENCOUNTER — HOSPITAL ENCOUNTER (OUTPATIENT)
Dept: PHYSICAL THERAPY | Facility: HOSPITAL | Age: 59
Setting detail: THERAPIES SERIES
Discharge: HOME | End: 2019-03-25

## 2019-03-25 DIAGNOSIS — I89.0 LYMPHEDEMA OF BOTH LOWER EXTREMITIES: Primary | ICD-10-CM

## 2019-03-25 PROCEDURE — 97140 MANUAL THERAPY 1/> REGIONS: CPT

## 2019-03-25 NOTE — THERAPY TREATMENT NOTE
Outpatient Physical Therapy Lymphedema Treatment Note  [facility]     Patient Name: Beni Fuchs  : 1960  MRN: [id number]  Today's Date: 3/25/2019        Visit Date: 2019    Visit Dx:    ICD-10-CM ICD-9-CM   1. Lymphedema of both lower extremities I89.0 457.1       There is no problem list on file for this patient.       Lymphedema     Row Name 19 0755             Subjective Pain    Able to rate subjective pain?  yes  -AL      Pre-Treatment Pain Level  0  -AL         Subjective Comments    Subjective Comments  He does not have any pain right now, he did have some when working.  His pain in both knees and feet, he rates this pain when working over the weekend was 8/10.  He has used the pump a few times, after the first use he noticed he started having genital swelling, and still has it.   -AL         Manual Lymphatic Drainage    Manual Lymphatic Drainage  initial sequence;opened regional lymph nodes;opened anastamoses;extremity treatment  -AL      Initial Sequence  short neck;abdomen;diaphragmatic breathing  -AL      Short Neck  had him practice hand placement  -AL      Abdomen  deep  -AL      Diaphragmatic Breathing  x 9 with deep abdominals.  -AL      Opened Regional Lymph Nodes  axillary;inguinal  -AL      Axillary  right;left  -AL      Inguinal  right;left  -AL      Opened Anastamoses  inguino-axillary  -AL      Inguino-Axillary  right;left  -AL      Extremity Treatment  MLD to full limb  -AL      MLD to Full Limb  BLE  -AL      Manual Lymphatic Drainage Comments  used double towel roll under each knee to perform MLD to posterior calf and distal thigh. Instructed patient to spend extra time working MLD to the abdomen  -AL      Manual Therapy  Spent extra time on MLD to the Abdomen, also did MLD to the suprapubic area.    -AL         Compression/Skin Care    Compression/Skin Care  skin care  -AL      Skin Care  lotion applied Hydorphor  -AL      Compression Garment Comments  Donned  the hybrid socks and Farrow Basic garments to each lower leg.   -AL      Compression/Skin Care Comments  I did apply a small Optiofoam dressing to the R shin over the blister that has now popped.  Patient is going to order a pair of 5X Bioflect compression capris.  Instructed patient to adjust the straps on the shorts so they are angeled to go over the genital area.  Made a chip bag to go over the genitals under the compression shorts.  Also instructed patient to make sure he does not wear underwear that is tight in the groin area.    -AL        User Key  (r) = Recorded By, (t) = Taken By, (c) = Cosigned By    Initials Name Provider Type    Jud Olvera PTA Physical Therapy Assistant                    @[email or web address]@    PT Assessment/Plan     Row Name 03/25/19 4180          PT Assessment    Assessment Comments  Since patient has started using the Flexitouch, he has devolped an increase in genital swelling.  He is going to adjust the short straps on the Flexitouch  garment to cross over the genital area.  Will see how he does with the chip bag. Included MLD to the suprapubic area today as well as extra abdominal work.  He is planning on ordering compression capris.   -AL        PT Plan    PT Plan Comments  Continue with CDT, see how he responds to adjustments made with the pump garment and using the chip bag.   -AL       User Key  (r) = Recorded By, (t) = Taken By, (c) = Cosigned By    Initials Name Provider Type    Jud Olvera PTA Physical Therapy Assistant             Exercises     Row Name 03/25/19 7376             Subjective Comments    Subjective Comments  He does not have any pain right now, he did have some when working.  His pain in both knees and feet, he rates this pain when working over the weekend was 8/10.  He has used the pump a few times, after the first use he noticed he started having genital swelling, and still has it.   -AL         Subjective Pain    Able to rate subjective pain?   yes  -AL      Pre-Treatment Pain Level  0  -AL        User Key  (r) = Recorded By, (t) = Taken By, (c) = Cosigned By    Initials Name Provider Type    Jud Olvera PTA Physical Therapy Assistant                      PT OP Goals     Row Name 03/25/19 0755          PT Short Term Goals    STG Date to Achieve  03/11/19  -AL     STG 1  Patient will show a good understanding of the condition of lymphedema and its care.   -AL     STG 1 Progress  Ongoing  -AL     STG 1 Progress Comments  Continue to educate  -AL     STG 2  Patient will have improvement in lower leg skin health with no further blistering.   -AL     STG 2 Progress  Ongoing  -AL     STG 2 Progress Comments  Skin is dry today  -AL     STG 3  Patient will be able to perform modified self massage and HEP for BLE lymphedema.  -AL     STG 3 Progress  Ongoing  -AL     STG 3 Progress Comments  He is going to work more on clearing the abdomen  -AL        Long Term Goals    LTG 1  Patient will be able to don and doff compression garments for BLE.  -AL     LTG 1 Progress  Ongoing  -AL     LTG 1 Progress Comments  He has help from a friend  -AL     LTG 2  Patient will be independent with use of a home lymphedema pump.  -AL     LTG 2 Progress  Ongoing  -AL     LTG 3  Patient will have a decrease in size of each LE by 10 cm or greater.   -AL     LTG 3 Progress  Ongoing  -AL     LTG 4  Patient will have no s/s infection.  -AL     LTG 4 Progress  Ongoing  -AL        Time Calculation    PT Goal Re-Cert Due Date  03/27/19  -AL       User Key  (r) = Recorded By, (t) = Taken By, (c) = Cosigned By    Initials Name Provider Type    Jud Olvera PTA Physical Therapy Assistant          Therapy Education  Education Details: Work on CDT, adjust Flexitouch grunk garment to crossover the genitals.  Order compression capris  Given: Edema management  Program: Reinforced, Progressed, New  How Provided: Demonstration, Verbal  Provided to: Patient  Level of Understanding:  Verbalized              Time Calculation:   Start Time: 0755  Stop Time: 0925  Time Calculation (min): 90 min  Total Timed Code Minutes- PT: 90 minute(s)   Therapy Charges for Today     Code Description Service Date Service Provider Modifiers Qty    64099989456 HC PT MANUAL THERAPY EA 15 MIN 3/25/2019 Jud Ojeda, PTA GP 6                    Jud Ojeda PTA  3/25/2019

## 2019-03-27 ENCOUNTER — HOSPITAL ENCOUNTER (OUTPATIENT)
Dept: PHYSICAL THERAPY | Facility: HOSPITAL | Age: 59
Setting detail: THERAPIES SERIES
Discharge: HOME | End: 2019-03-27

## 2019-03-27 DIAGNOSIS — I89.0 LYMPHEDEMA OF BOTH LOWER EXTREMITIES: Primary | ICD-10-CM

## 2019-03-27 PROCEDURE — 97140 MANUAL THERAPY 1/> REGIONS: CPT | Performed by: PHYSICAL THERAPIST

## 2019-03-27 NOTE — THERAPY PROGRESS REPORT/RE-CERT
Outpatient Physical Therapy Lymphedema Progress Note  [facility]     Patient Name: Beni Fuchs  : 1960  MRN: [id number]  Today's Date: 3/27/2019        Visit Date: 2019    Visit Dx:    ICD-10-CM ICD-9-CM   1. Lymphedema of both lower extremities I89.0 457.1       There is no problem list on file for this patient.       Lymphedema     Row Name 19 0800             Subjective Pain    Able to rate subjective pain?  yes  -HR      Pre-Treatment Pain Level  0  -HR      Post-Treatment Pain Level  4  -HR      Subjective Pain Comment  hip pain after lying on table  -HR         Subjective Comments    Subjective Comments  He broke down crying multiple times today during the visit. He is having more swelling in the groin and more trouble getting around and is just really upset.   -HR         Skin Changes/Observations    Skin Observations Comment  Both lower legs are very flaky and red. There is a new blister that has come up on the R lower leg closer to the ankle. There are also 2 blisters on the L lateral distal leg.  -HR         Manual Lymphatic Drainage    Initial Sequence  short neck;abdomen;diaphragmatic breathing  -HR      Short Neck  extra time  -HR      Abdomen  superficial;deep  -HR      Abdomen Comment  Spent lots of extra time on abdomen. Did both superificial and deep with him taking as deep breaths as he could.   -HR      Diaphragmatic Breathing  X9  -HR      Opened Regional Lymph Nodes  axillary  -HR      Axillary  right;left  -HR      Opened Anastamoses  inguino-axillary  -HR      Inguino-Axillary  right;left  -HR      Manual Lymphatic Drainage Comments  Spent entire treatment on decongesting the trunk. I worked on the suprapubic area as well as the inguinals on both sides but I did not treat the legs as I didn't want to move any more fluid up to the groin. He is going to do the pump to the inguinals 2X/day and 1 leg per day to his comfort. He has ordered the compression leggings but  doesn't have them yet. He is having to wear a pad in his boxer shorts because he leaks urine when he is getting up and down and in and out of the car from the sheer compression of the tissues.   -HR         Compression/Skin Care    Compression/Skin Care  skin care  -HR      Skin Care  lotion applied  -HR      Compression/Skin Care Comments  I applied hydraphor to the skin on his lower legs but I did not place the compression garments.   -HR         L-Dex Bioimpedence Screening    L-Dex Measurement Extremity  RLE;LLE  -HR      L-Dex Patient Position  -- sitting  -HR      L-Dex LE Dominate Side  Right  -HR      L-Dex LE Pre Surgical Value  No  -HR      L-Dex LE Comment  HyDex score of 50.5; Total Body Water of 92.8L  -HR        User Key  (r) = Recorded By, (t) = Taken By, (c) = Cosigned By    Initials Name Provider Type    HR Alena Galan, PT, DPT, CLT-CHAVA Physical Therapist                    [unfilled]    PT Assessment/Plan     Row Name 03/27/19 0800          PT Assessment    Impairments  Edema;Integumentary integrity;Gait;Impaired flexibility;Pain  -HR     Assessment Comments  Mr. Fuchs is suffering terribly with the swelling.  He is doing what we have asked him to do and it is only moving fluid from the legs to the groin which is causing even more discomfort and embarassment. I checked his vitals and his BP was still very high at 195/88 but he stated he had not taken his AM pills yet. His temperature was 97.2 but he did have chills while in the room. My treatment today only focused on trying to decongest his trunk, abdomen and inguinal region to try to facilitate more relief in the groin.  Due to his symptoms and reporting it even makes it hard to take a deep breath because there is no room, I decided to use the SOZO device and test his fluid levels. This has been approved for tracking CHF patients and I wanted to see if the reading implied that he was having any undiagnosed CHF. It did not  show that. His ratio of intracellular and extracellular fluid was normal. The most significant results were that he has 92.8 Liters of total body water which is extremely high but coincides with the result showing he is 50 times more hydrated than a normal man his age. He has very little fat mass at 15.4% of his weight and in fact has 34% of his weight in skeletal muscle mass. His basal metabolic rate is 2318 cals/day which is very high as well. These results seem to strongly suggest that his kidneys are not excreting the fluid that they should be. He worked for 10 hours overnight, drinking water and coffee and did not urinate until he got home. He is on a diuretic 2X/day. He should be urinating much, much more than he is. I checked his latest lab results which show that his protein and albumin levels are quite low and have been since at least last June. This would support nephrotic syndrome as a possible cause of his lack of urination, large amount of edema and his HTN that has been so hard to control. I have contacted his primary physician's office and made him an appointment for tomorrow morning.   -HR        PT Plan    PT Frequency  2x/week  -HR     Predicted Duration of Therapy Intervention (Therapy Eval)  4 weeks  -HR     PT Plan Comments  Follow up on MD findings. Cont POC as indicated.   -HR       User Key  (r) = Recorded By, (t) = Taken By, (c) = Cosigned By    Initials Name Provider Type    HR Alena Galan, PT, DPT, CLT-CHAVA Physical Therapist             Exercises     Row Name 03/27/19 0800             Subjective Comments    Subjective Comments  He broke down crying multiple times today during the visit. He is having more swelling in the groin and more trouble getting around and is just really upset.   -HR         Subjective Pain    Able to rate subjective pain?  yes  -HR      Pre-Treatment Pain Level  0  -HR      Post-Treatment Pain Level  4  -HR      Subjective Pain Comment  hip pain after  lying on table  -HR        User Key  (r) = Recorded By, (t) = Taken By, (c) = Cosigned By    Initials Name Provider Type    HR Alena Galan, PT, DPT, CLYEE Physical Therapist                      PT OP Goals     Row Name 03/27/19 0800          PT Short Term Goals    STG Date to Achieve  03/11/19  -HR     STG 1  Patient will show a good understanding of the condition of lymphedema and its care.   -HR     STG 1 Progress  Ongoing  -HR     STG 2  Patient will have improvement in lower leg skin health with no further blistering.   -HR     STG 2 Progress  Ongoing  -HR     STG 3  Patient will be able to perform modified self massage and HEP for BLE lymphedema.  -HR     STG 3 Progress  Ongoing  -HR        Long Term Goals    LTG 1  Patient will be able to don and doff compression garments for BLE.  -HR     LTG 1 Progress  Ongoing  -HR     LTG 2  Patient will be independent with use of a home lymphedema pump.  -HR     LTG 2 Progress  Ongoing  -HR     LTG 3  Patient will have a decrease in size of each LE by 10 cm or greater.   -HR     LTG 3 Progress  Ongoing  -HR     LTG 4  Patient will have no s/s infection.  -HR     LTG 4 Progress  Ongoing  -HR        Time Calculation    PT Goal Re-Cert Due Date  04/26/19  -HR       User Key  (r) = Recorded By, (t) = Taken By, (c) = Cosigned By    Initials Name Provider Type    HR Alena Galan, PT, DPT, CLYEE Physical Therapist          Therapy Education  Education Details: use the pump to the inguinal and trunk more frequently. Only use on leg per his comfort.  Go to MD tomorrow.               Time Calculation:       Therapy Charges for Today     Code Description Service Date Service Provider Modifiers Qty    78556004578 HC PT MANUAL THERAPY EA 15 MIN 3/27/2019 Alena Galan, PT, DPT, CLT-CHAVA GP 6                    Alena Galan PT, DPT, CLSAMMY-CHAVA  3/27/2019

## 2019-03-28 LAB
ANION GAP SERPL CALCULATED.3IONS-SCNC: 14 MMOL/L (ref 7–19)
BASOPHILS ABSOLUTE: 0.1 K/UL (ref 0–0.2)
BASOPHILS RELATIVE PERCENT: 1.4 % (ref 0–1)
BUN BLDV-MCNC: 34 MG/DL (ref 6–20)
CALCIUM SERPL-MCNC: 7.6 MG/DL (ref 8.6–10)
CHLORIDE BLD-SCNC: 112 MMOL/L (ref 98–111)
CO2: 20 MMOL/L (ref 22–29)
CREAT SERPL-MCNC: 2.2 MG/DL (ref 0.5–1.2)
EOSINOPHILS ABSOLUTE: 0.7 K/UL (ref 0–0.6)
EOSINOPHILS RELATIVE PERCENT: 7.2 % (ref 0–5)
GFR NON-AFRICAN AMERICAN: 31
GLUCOSE BLD-MCNC: 104 MG/DL (ref 74–109)
HCT VFR BLD CALC: 35.7 % (ref 42–52)
HEMOGLOBIN: 10.7 G/DL (ref 14–18)
LYMPHOCYTES ABSOLUTE: 2.1 K/UL (ref 1.1–4.5)
LYMPHOCYTES RELATIVE PERCENT: 22.4 % (ref 20–40)
MCH RBC QN AUTO: 28.8 PG (ref 27–31)
MCHC RBC AUTO-ENTMCNC: 30 G/DL (ref 33–37)
MCV RBC AUTO: 96.2 FL (ref 80–94)
MONOCYTES ABSOLUTE: 0.6 K/UL (ref 0–0.9)
MONOCYTES RELATIVE PERCENT: 6 % (ref 0–10)
NEUTROPHILS ABSOLUTE: 5.9 K/UL (ref 1.5–7.5)
NEUTROPHILS RELATIVE PERCENT: 62.6 % (ref 50–65)
PDW BLD-RTO: 14.4 % (ref 11.5–14.5)
PLATELET # BLD: 321 K/UL (ref 130–400)
PMV BLD AUTO: 10.8 FL (ref 9.4–12.4)
POTASSIUM SERPL-SCNC: 3.9 MMOL/L (ref 3.5–5)
RBC # BLD: 3.71 M/UL (ref 4.7–6.1)
REASON FOR REJECTION: NORMAL
REJECTED TEST: NORMAL
SODIUM BLD-SCNC: 146 MMOL/L (ref 136–145)
TSH SERPL DL<=0.05 MIU/L-ACNC: 6.3 UIU/ML (ref 0.27–4.2)
WBC # BLD: 9.4 K/UL (ref 4.8–10.8)

## 2019-04-03 ENCOUNTER — HOSPITAL ENCOUNTER (OUTPATIENT)
Dept: PHYSICAL THERAPY | Facility: HOSPITAL | Age: 59
Setting detail: THERAPIES SERIES
Discharge: HOME | End: 2019-04-03

## 2019-04-03 DIAGNOSIS — I89.0 LYMPHEDEMA OF BOTH LOWER EXTREMITIES: Primary | ICD-10-CM

## 2019-04-03 PROCEDURE — 97140 MANUAL THERAPY 1/> REGIONS: CPT

## 2019-04-03 NOTE — THERAPY TREATMENT NOTE
Outpatient Physical Therapy Lymphedema Treatment Note  [facility]     Patient Name: Beni Fuchs  : 1960  MRN: [id number]  Today's Date: 4/3/2019        Visit Date: 2019    Visit Dx:    ICD-10-CM ICD-9-CM   1. Lymphedema of both lower extremities I89.0 457.1       There is no problem list on file for this patient.       Lymphedema     Row Name 19 0915             Subjective Pain    Able to rate subjective pain?  yes  -AL      Pre-Treatment Pain Level  0  -AL         Subjective Comments    Subjective Comments  After he has worked about 5 hours he starts having knee pain and also pain on the bottom of his feet.  He can sit and rest, this helps the knee pain but not the foot pain.  This is worse if he is standing still.  He states he has had very little improvement since the DrЕкатерина increased his diuretics.   He has not worn the Farrow Basic garments as much becuase he still has the swelling in the genital area, and he would rather have the swelling in the legs than in the genitals.  His Dr. has increased his diruetics and he is urinating more, but doesn't feel like the fluid is changing much in the legs, abdomen, or genitals.   He went from 1 mg to 1.5 mg twice a day.  He is using the Flexitouch pump and and has adjusted the thigh straps on the trunk garment over the genitals and he thinks this has helped. He now has the compression leggings as well.  He is using the chip bag over the genital area when he is at home.  The DrЕкатерина also increased patient's thyroid medication.  -AL         Manual Lymphatic Drainage    Manual Lymphatic Drainage  initial sequence;opened regional lymph nodes;opened anastamoses;extremity treatment  -AL      Initial Sequence  short neck;abdomen;diaphragmatic breathing  -AL      Short Neck  had him practice hand placement  -AL      Abdomen  deep  -AL      Abdomen Comment  Extra time spent on abdomen  -AL      Opened Regional Lymph Nodes  axillary;inguinal  -AL       Axillary  right;left  -AL      Inguinal  right;left  -AL      Opened Anastamoses  inguino-axillary  -AL      Inguino-Axillary  right;left  -AL      Extremity Treatment  MLD to full limb  -AL      MLD to Full Limb  B LE  -AL      Manual Lymphatic Drainage Comments  Brief amount of time doing the MLD to B LE, spent most of time doing the MLD to the abdomen and suprapubic areas.   -AL      Manual Therapy  Instructed patient to contact his DrЕкатерина about seeing a kidney specialist.   -AL         Compression/Skin Care    Compression/Skin Care  skin care  -AL      Skin Care  lotion applied Hydorphor  -AL      Compression/Skin Care Comments  I applied the Hybrid socks, did not apply the Farrow Basic wraps.   -AL        User Key  (r) = Recorded By, (t) = Taken By, (c) = Cosigned By    Initials Name Provider Type    Jud Olvera PTA Physical Therapy Assistant                    [unfilled]    PT Assessment/Plan     Row Name 04/03/19 0915          PT Assessment    Assessment Comments  Patient is going to check into seeing a kidney specialist.  He still presents with dense tissue in the abdomen, both thighs, and the suprapubic area.  He is still having genital swelling.  He is wearing the compression and has adjusted the Flexitouch garment so it covers the genital area.  Will measure next treatment.   -AL        PT Plan    PT Plan Comments  Will continue with POC.  -AL       User Key  (r) = Recorded By, (t) = Taken By, (c) = Cosigned By    Initials Name Provider Type    Jud Olvera PTA Physical Therapy Assistant             Exercises     Row Name 04/03/19 8063             Subjective Comments    Subjective Comments  After he has worked about 5 hours he starts having knee pain and also pain on the bottom of his feet.  He can sit and rest, this helps the knee pain but not the foot pain.  This is worse if he is standing still.  He states he has had very little improvement since the DrЕкатерина increased his diuretics.   He  has not worn the Farrow Basic garments as much becuase he still has the swelling in the genital area, and he would rather have the swelling in the legs than in the genitals.  His Dr. has increased his diruetics and he is urinating more, but doesn't feel like the fluid is changing much in the legs, abdomen, or genitals.   He went from 1 mg to 1.5 mg twice a day.  He is using the Flexitouch pump and and has adjusted the thigh straps on the trunk garment over the genitals and he thinks this has helped. He now has the compression leggings as well.  He is using the chip bag over the genital area when he is at home.  The DrЕкатерина also increased patient's thyroid medication.  -AL         Subjective Pain    Able to rate subjective pain?  yes  -AL      Pre-Treatment Pain Level  0  -AL        User Key  (r) = Recorded By, (t) = Taken By, (c) = Cosigned By    Initials Name Provider Type    Jud Olvera PTA Physical Therapy Assistant                      PT OP Goals     Row Name 04/03/19 0915          PT Short Term Goals    STG Date to Achieve  03/11/19  -AL     STG 1  Patient will show a good understanding of the condition of lymphedema and its care.   -AL     STG 1 Progress  Ongoing  -AL     STG 1 Progress Comments  Continue to educate during treatment.  -AL     STG 2  Patient will have improvement in lower leg skin health with no further blistering.   -AL     STG 2 Progress  Ongoing  -AL     STG 2 Progress Comments  Skin is dry, several small open areas on both lower legs.  -AL     STG 3  Patient will be able to perform modified self massage and HEP for BLE lymphedema.  -AL     STG 3 Progress  Ongoing  -AL        Long Term Goals    LTG 1  Patient will be able to don and doff compression garments for BLE.  -AL     LTG 1 Progress  Ongoing  -AL     LTG 1 Progress Comments  He is not wearing compression as much  -AL     LTG 2  Patient will be independent with use of a home lymphedema pump.  -AL     LTG 2 Progress  Ongoing  -AL      LTG 2 Progress Comments  He is using the pump and has adjusted it so the pump will work on the genital area.  -AL     LTG 3  Patient will have a decrease in size of each LE by 10 cm or greater.   -AL     LTG 3 Progress  Ongoing  -AL     LTG 4  Patient will have no s/s infection.  -AL     LTG 4 Progress  Ongoing  -AL        Time Calculation    PT Goal Re-Cert Due Date  04/26/19  -AL       User Key  (r) = Recorded By, (t) = Taken By, (c) = Cosigned By    Initials Name Provider Type    Jud Olvera PTA Physical Therapy Assistant          Therapy Education  Education Details: Check into seeng a kidney specialist.  Continue with Flexitouch.  Given: Edema management  Program: Reinforced, New  How Provided: Verbal  Provided to: Patient  Level of Understanding: Verbalized              Time Calculation:   Start Time: 0915  Stop Time: 1040  Time Calculation (min): 85 min  Total Timed Code Minutes- PT: 85 minute(s)   Therapy Charges for Today     Code Description Service Date Service Provider Modifiers Qty    [id number] HC PT MANUAL THERAPY EA 15 MIN 4/3/2019 Jud Ojeda PTA GP 6                    Ojeda, Jud, PTA, CLT-CHAVA  4/3/2019. 12:53 PM   4/3/2019

## 2019-04-05 ENCOUNTER — HOSPITAL ENCOUNTER (OUTPATIENT)
Dept: PHYSICAL THERAPY | Facility: HOSPITAL | Age: 59
Setting detail: THERAPIES SERIES
Discharge: HOME | End: 2019-04-05

## 2019-04-05 DIAGNOSIS — I89.0 LYMPHEDEMA OF BOTH LOWER EXTREMITIES: Primary | ICD-10-CM

## 2019-04-05 PROCEDURE — 97140 MANUAL THERAPY 1/> REGIONS: CPT

## 2019-04-05 NOTE — THERAPY TREATMENT NOTE
Outpatient Physical Therapy Lymphedema Treatment Note  [facility]     Patient Name: Beni Fuchs  : 1960  MRN: [id number]  Today's Date: 2019        Visit Date: 2019    Visit Dx:    ICD-10-CM ICD-9-CM   1. Lymphedema of both lower extremities I89.0 457.1       There is no problem list on file for this patient.       Lymphedema     Row Name 19 0915             Subjective Pain    Able to rate subjective pain?  yes  -AL      Pre-Treatment Pain Level  0  -AL         Subjective Comments    Subjective Comments  He states he fell yesterday when trying to move furniture.  He landed on his butt and hit his head.  He had a headache for a little while, but he is fine now.  He did speak with Dr. Hall about seeing a kidney specialist.  Dr. Hall's office is going to send a referral to a kidney speacialist, he is not sure which kidney Dr. he will see.  If he does not hear from a kidney specialist in tow weeks, he is to call Dr. Hall's office and let them know.   -AL         Lymphedema Measurements    Measurement Type(s)  Circumferential  -AL      Circumferential Areas  Lower extremities  -AL         BLE Circumferential (cm)    Measurement Location 1  BOT  -AL      Left 1  27.9 cm  -AL      Right 1  27.7 cm  -AL      Measurement Location 2  +10 from heel base  -AL      Left 2  36.4 cm  -AL      Right 2  32.1 cm  -AL      Measurement Location 3  +10  -AL      Left 3  33.7 cm  -AL      Right 3  32.5 cm  -AL      Measurement Location 4  +10  -AL      Left 4  46 cm  -AL      Right 4  42.9 cm  -AL      Measurement Location 5  +10  -AL      Left 5  55 cm  -AL      Right 5  51.5 cm  -AL      Measurement Location 6  +10  -AL      Left 6  57.5 cm  -AL      Right 6  56.5 cm  -AL      Measurement Location 7  +10  -AL      Left 7  69.5 cm  -AL      Right 7  69.5 cm  -AL      LLE Circumferential Total  326 cm  -AL      RLE Circumferential Total  312.7 cm  -AL         Manual Lymphatic Drainage    Manual  Lymphatic Drainage  initial sequence;opened regional lymph nodes;opened anastamoses;extremity treatment  -AL      Initial Sequence  short neck;abdomen;diaphragmatic breathing  -AL      Short Neck  had him practice hand placement  -AL      Abdomen  deep  -AL      Abdomen Comment  Extra time spent on abdomen  -AL      Diaphragmatic Breathing  x 9 with deep abdominals.  -AL      Opened Regional Lymph Nodes  axillary;inguinal  -AL      Axillary  right;left  -AL      Inguinal  right;left  -AL      Opened Anastamoses  inguino-axillary  -AL      Inguino-Axillary  right;left  -AL      Extremity Treatment  MLD to full limb  -AL      MLD to Full Limb  B LE  -AL      Manual Lymphatic Drainage Comments  Brief amount of time doing the MLD to B LE, spent most of time doing the MLD to the abdomen and suprapubic areas.   -AL         Compression/Skin Care    Compression/Skin Care  skin care  -AL      Skin Care  lotion applied Hydorphor  -AL      Compression/Skin Care Comments  Applied patient's diabetic socks.   -AL        User Key  (r) = Recorded By, (t) = Taken By, (c) = Cosigned By    Initials Name Provider Type    Jud Olvera PTA Physical Therapy Assistant                    [unfilled]    PT Assessment/Plan     Row Name 04/05/19 8209          PT Assessment    Assessment Comments  Patient has had a reduction in the R LE, and increase in size in the L LE.  He still presents with genital swelling.   He has contacted Dr. Hall's office and they have referred him to a kidney specialist.  He does not know which Dr. he is being referred to.  He is not wearing the compresson on the lower legs at this time.   -AL        PT Plan    PT Plan Comments  Will continue with POC.  -AL       User Key  (r) = Recorded By, (t) = Taken By, (c) = Cosigned By    Initials Name Provider Type    Jud Olvera PTA Physical Therapy Assistant             Exercises     Row Name 04/05/19 7259             Subjective Comments    Subjective  Comments  He states he fell yesterday when trying to move furniture.  He landed on his butt and hit his head.  He had a headache for a little while, but he is fine now.  He did speak with Dr. Hall about seeing a kidney specialist.  Dr. Hall's office is going to send a referral to a kidney speacialist, he is not sure which kidney Dr. he will see.  If he does not hear from a kidney specialist in tow weeks, he is to call Dr. Hall's office and let them know.   -AL         Subjective Pain    Able to rate subjective pain?  yes  -AL      Pre-Treatment Pain Level  0  -AL        User Key  (r) = Recorded By, (t) = Taken By, (c) = Cosigned By    Initials Name Provider Type    Jud Olvera, PTA Physical Therapy Assistant                      PT OP Goals     Row Name 04/05/19 0915          PT Short Term Goals    STG Date to Achieve  03/11/19  -AL     STG 1  Patient will show a good understanding of the condition of lymphedema and its care.   -AL     STG 1 Progress  Ongoing  -AL     STG 1 Progress Comments  Continues to improve  -AL     STG 2  Patient will have improvement in lower leg skin health with no further blistering.   -AL     STG 2 Progress  Ongoing  -AL     STG 2 Progress Comments  Some blistering still present.  -AL     STG 3  Patient will be able to perform modified self massage and HEP for BLE lymphedema.  -AL     STG 3 Progress  Ongoing  -AL        Long Term Goals    LTG 1  Patient will be able to don and doff compression garments for BLE.  -AL     LTG 1 Progress  Ongoing  -AL     LTG 2  Patient will be independent with use of a home lymphedema pump.  -AL     LTG 2 Progress  Ongoing  -AL     LTG 3  Patient will have a decrease in size of each LE by 10 cm or greater.   -AL     LTG 3 Progress  Ongoing  -AL     LTG 4  Patient will have no s/s infection.  -AL     LTG 4 Progress  Ongoing  -AL        Time Calculation    PT Goal Re-Cert Due Date  04/26/19  -AL       User Key  (r) = Recorded By, (t) = Taken By, (c) =  Cosigned By    Initials Name Provider Type    AL Jud Ojeda PTA Physical Therapy Assistant          Therapy Education  Education Details: Continue to use Flexitouch for the trunk.   Given: Edema management  Program: Reinforced  How Provided: Verbal  Provided to: Patient  Level of Understanding: Verbalized              Time Calculation:   Start Time: 0915  Stop Time: 1040  Time Calculation (min): 85 min  Total Timed Code Minutes- PT: 85 minute(s)   Therapy Charges for Today     Code Description Service Date Service Provider Modifiers Qty    22304997854 HC PT MANUAL THERAPY EA 15 MIN 4/5/2019 Jud Ojeda PTA GP 6                    Ojeda, Jud, PTA, CLT-CHAVA  4/5/2019. 3:36 PM    4/5/2019

## 2019-04-10 ENCOUNTER — TELEPHONE (OUTPATIENT)
Dept: PHYSICAL THERAPY | Facility: HOSPITAL | Age: 59
End: 2019-04-10

## 2019-04-10 ENCOUNTER — HOSPITAL ENCOUNTER (OUTPATIENT)
Dept: PHYSICAL THERAPY | Facility: HOSPITAL | Age: 59
Setting detail: THERAPIES SERIES
Discharge: HOME | End: 2019-04-10

## 2019-04-10 ENCOUNTER — HOSPITAL ENCOUNTER (EMERGENCY)
Facility: HOSPITAL | Age: 59
Discharge: HOME | End: 2019-04-10
Admitting: EMERGENCY MEDICINE

## 2019-04-10 VITALS
BODY MASS INDEX: 45.1 KG/M2 | OXYGEN SATURATION: 95 % | RESPIRATION RATE: 18 BRPM | HEIGHT: 70 IN | TEMPERATURE: 97.4 F | HEART RATE: 89 BPM | DIASTOLIC BLOOD PRESSURE: 71 MMHG | SYSTOLIC BLOOD PRESSURE: 202 MMHG | WEIGHT: 315 LBS

## 2019-04-10 DIAGNOSIS — I89.0 LYMPHEDEMA: Primary | ICD-10-CM

## 2019-04-10 DIAGNOSIS — I10 HYPERTENSION, UNSPECIFIED TYPE: ICD-10-CM

## 2019-04-10 LAB
ALBUMIN SERPL-MCNC: 2.8 G/DL (ref 3.5–5)
ALBUMIN/GLOB SERPL: 1 G/DL (ref 1.1–2.5)
ALP SERPL-CCNC: 84 U/L (ref 24–120)
ALT SERPL W P-5'-P-CCNC: 18 U/L (ref 0–54)
ANION GAP SERPL CALCULATED.3IONS-SCNC: 4 MMOL/L (ref 4–13)
AST SERPL-CCNC: 27 U/L (ref 7–45)
BACTERIA UR QL AUTO: ABNORMAL /HPF
BASOPHILS # BLD AUTO: 0.13 10*3/MM3 (ref 0–0.2)
BASOPHILS NFR BLD AUTO: 1.2 % (ref 0–2)
BILIRUB SERPL-MCNC: 0.4 MG/DL (ref 0.1–1)
BILIRUB UR QL STRIP: NEGATIVE
BUN BLD-MCNC: 18 MG/DL (ref 5–21)
BUN/CREAT SERPL: 12.8 (ref 7–25)
CALCIUM SPEC-SCNC: 7.9 MG/DL (ref 8.4–10.4)
CHLORIDE SERPL-SCNC: 107 MMOL/L (ref 98–110)
CLARITY UR: CLEAR
CO2 SERPL-SCNC: 30 MMOL/L (ref 24–31)
COLOR UR: YELLOW
CREAT BLD-MCNC: 1.41 MG/DL (ref 0.5–1.4)
DEPRECATED RDW RBC AUTO: 47.3 FL (ref 40–54)
EOSINOPHIL # BLD AUTO: 0.48 10*3/MM3 (ref 0–0.7)
EOSINOPHIL NFR BLD AUTO: 4.5 % (ref 0–4)
ERYTHROCYTE [DISTWIDTH] IN BLOOD BY AUTOMATED COUNT: 14.3 % (ref 12–15)
GFR SERPL CREATININE-BSD FRML MDRD: 51 ML/MIN/1.73
GLOBULIN UR ELPH-MCNC: 2.9 GM/DL
GLUCOSE BLD-MCNC: 99 MG/DL (ref 70–100)
GLUCOSE UR STRIP-MCNC: ABNORMAL MG/DL
HCT VFR BLD AUTO: 33.9 % (ref 40–52)
HGB BLD-MCNC: 10.8 G/DL (ref 14–18)
HGB UR QL STRIP.AUTO: ABNORMAL
HYALINE CASTS UR QL AUTO: ABNORMAL /LPF
IMM GRANULOCYTES # BLD AUTO: 0.04 10*3/MM3 (ref 0–0.05)
IMM GRANULOCYTES NFR BLD AUTO: 0.4 % (ref 0–5)
KETONES UR QL STRIP: NEGATIVE
LEUKOCYTE ESTERASE UR QL STRIP.AUTO: NEGATIVE
LYMPHOCYTES # BLD AUTO: 1.56 10*3/MM3 (ref 0.72–4.86)
LYMPHOCYTES NFR BLD AUTO: 14.5 % (ref 15–45)
MCH RBC QN AUTO: 28.7 PG (ref 28–32)
MCHC RBC AUTO-ENTMCNC: 31.9 G/DL (ref 33–36)
MCV RBC AUTO: 90.2 FL (ref 82–95)
MONOCYTES # BLD AUTO: 0.67 10*3/MM3 (ref 0.19–1.3)
MONOCYTES NFR BLD AUTO: 6.2 % (ref 4–12)
NEUTROPHILS # BLD AUTO: 7.86 10*3/MM3 (ref 1.87–8.4)
NEUTROPHILS NFR BLD AUTO: 73.2 % (ref 39–78)
NITRITE UR QL STRIP: NEGATIVE
NRBC BLD AUTO-RTO: 0 /100 WBC (ref 0–0)
NT-PROBNP SERPL-MCNC: 1830 PG/ML (ref 0–900)
PH UR STRIP.AUTO: 8 [PH] (ref 5–8)
PLATELET # BLD AUTO: 351 10*3/MM3 (ref 130–400)
PMV BLD AUTO: 10.1 FL (ref 6–12)
POTASSIUM BLD-SCNC: 4.5 MMOL/L (ref 3.5–5.3)
PROT SERPL-MCNC: 5.7 G/DL (ref 6.3–8.7)
PROT UR QL STRIP: ABNORMAL
RBC # BLD AUTO: 3.76 10*6/MM3 (ref 4.8–5.9)
RBC # UR: ABNORMAL /HPF
REF LAB TEST METHOD: ABNORMAL
SODIUM BLD-SCNC: 141 MMOL/L (ref 135–145)
SP GR UR STRIP: 1.03 (ref 1–1.03)
SQUAMOUS #/AREA URNS HPF: ABNORMAL /HPF
TROPONIN I SERPL-MCNC: <0.012 NG/ML (ref 0–0.03)
UROBILINOGEN UR QL STRIP: ABNORMAL
WBC NRBC COR # BLD: 10.74 10*3/MM3 (ref 4.8–10.8)
WBC UR QL AUTO: ABNORMAL /HPF

## 2019-04-10 PROCEDURE — 93010 ELECTROCARDIOGRAM REPORT: CPT | Performed by: INTERNAL MEDICINE

## 2019-04-10 PROCEDURE — 81001 URINALYSIS AUTO W/SCOPE: CPT | Performed by: NURSE PRACTITIONER

## 2019-04-10 PROCEDURE — 80053 COMPREHEN METABOLIC PANEL: CPT | Performed by: NURSE PRACTITIONER

## 2019-04-10 PROCEDURE — 87150 DNA/RNA AMPLIFIED PROBE: CPT | Performed by: NURSE PRACTITIONER

## 2019-04-10 PROCEDURE — 83880 ASSAY OF NATRIURETIC PEPTIDE: CPT | Performed by: NURSE PRACTITIONER

## 2019-04-10 PROCEDURE — 85025 COMPLETE CBC W/AUTO DIFF WBC: CPT | Performed by: NURSE PRACTITIONER

## 2019-04-10 PROCEDURE — 93005 ELECTROCARDIOGRAM TRACING: CPT | Performed by: NURSE PRACTITIONER

## 2019-04-10 PROCEDURE — 36415 COLL VENOUS BLD VENIPUNCTURE: CPT | Performed by: NURSE PRACTITIONER

## 2019-04-10 PROCEDURE — 87040 BLOOD CULTURE FOR BACTERIA: CPT | Performed by: NURSE PRACTITIONER

## 2019-04-10 PROCEDURE — 99284 EMERGENCY DEPT VISIT MOD MDM: CPT

## 2019-04-10 PROCEDURE — 84484 ASSAY OF TROPONIN QUANT: CPT | Performed by: NURSE PRACTITIONER

## 2019-04-10 RX ORDER — CLONIDINE HYDROCHLORIDE 0.1 MG/1
0.1 TABLET ORAL ONCE
Status: COMPLETED | OUTPATIENT
Start: 2019-04-10 | End: 2019-04-10

## 2019-04-10 RX ORDER — LISINOPRIL 40 MG/1
40 TABLET ORAL 2 TIMES DAILY
COMMUNITY
End: 2019-12-17

## 2019-04-10 RX ORDER — CLONIDINE HYDROCHLORIDE 0.2 MG/1
0.2 TABLET ORAL 2 TIMES DAILY
COMMUNITY

## 2019-04-10 RX ORDER — CEPHALEXIN 500 MG/1
500 CAPSULE ORAL 3 TIMES DAILY
Qty: 30 CAPSULE | Refills: 0 | Status: SHIPPED | OUTPATIENT
Start: 2019-04-10 | End: 2019-04-20

## 2019-04-10 RX ORDER — METOPROLOL TARTRATE 50 MG/1
50 TABLET, FILM COATED ORAL 2 TIMES DAILY
COMMUNITY
End: 2019-12-17

## 2019-04-10 RX ORDER — LEVOTHYROXINE SODIUM 88 UG/1
88 TABLET ORAL DAILY
COMMUNITY

## 2019-04-10 RX ORDER — BUMETANIDE 1 MG/1
1.5 TABLET ORAL 2 TIMES DAILY
COMMUNITY
End: 2019-12-17

## 2019-04-10 RX ORDER — HYDRALAZINE HYDROCHLORIDE 50 MG/1
100 TABLET, FILM COATED ORAL 2 TIMES DAILY
COMMUNITY

## 2019-04-10 RX ADMIN — CLONIDINE HYDROCHLORIDE 0.1 MG: 0.1 TABLET ORAL at 13:51

## 2019-04-10 RX ADMIN — CLONIDINE HYDROCHLORIDE 0.1 MG: 0.1 TABLET ORAL at 14:47

## 2019-04-10 NOTE — ED NOTES
Patient reports that this is a normal blood pressure for him and that he does not feel symptomatic. Patient reports that he takes several medications for blood pressure treatment and this is his normal.      Armand Tom RN  04/10/19 6425

## 2019-04-10 NOTE — ED NOTES
Lab contacted transferred to phlebotomy. Request that labs be collected due to patient being a difficult stick. IV in place. Waiting for blood collection.      Armand Tom RN  04/10/19 1100

## 2019-04-10 NOTE — THERAPY TREATMENT NOTE
Outpatient Physical Therapy Lymphedema Treatment Note  [facility]     Patient Name: Beni Fuchs  : 1960  MRN: [id number]  Today's Date: 4/10/2019        Visit Date: 04/10/2019    Visit Dx:    ICD-10-CM ICD-9-CM   1. ERRONEOUS ENCOUNTER--DISREGARD         There is no problem list on file for this patient.       Lymphedema     Row Name 04/10/19 1000 04/10/19 0915          Subjective Comments    Subjective Comments  --  -AL  --  -AL        Manual Lymphatic Drainage    Manual Therapy  --  --  -AL       User Key  (r) = Recorded By, (t) = Taken By, (c) = Cosigned By    Initials Name Provider Type    Yonatan Olveraa HEATHER, PTA, CLT-CHAVA Physical Therapy Assistant                    @[facility]@    PT Assessment/Plan     Row Name 04/10/19 0915          PT Assessment    Assessment Comments  --  -AL        PT Plan    PT Plan Comments  --  -AL       User Key  (r) = Recorded By, (t) = Taken By, (c) = Cosigned By    Initials Name Provider Type    Jud Olvera, PTA, CLT-CHAVA Physical Therapy Assistant             Exercises     Row Name 04/10/19 1000 04/10/19 0915          Subjective Comments    Subjective Comments  --  -AL  --  -AL       User Key  (r) = Recorded By, (t) = Taken By, (c) = Cosigned By    Initials Name Provider Type    TANIKA Ojeda Jud P, PTA, CLT-CHAVA Physical Therapy Assistant                                         Time Calculation:                     Jud Ojeda PTA, CLT-CHAVA  4/10/2019

## 2019-04-10 NOTE — ED PROVIDER NOTES
Subjective   Patient is a 58-year-old male presents emergency department with complaints of lower extremity swelling and weeping.  Patient has chronic lymphedema and is currently being treated at the cancer center for lymphedema.  He states 2 days ago he began having worsening swelling and drainage from his legs bilaterally.  He had noted blistering and sloughing of the skin.  Patient has had no recorded fevers.  He states while at work on Friday he noted itching to his legs and began scratching them.  He states by the end of his work shift his pant pants legs were soaking wet from the drainage to his lower extremities.  He went to lymphedema clinic today and was instructed to come to the emergency department for further evaluation and treatment.        Lower Extremity Issue   Location:  Leg  Leg location:  R leg and L leg  Pain details:     Severity:  Severe    Onset quality:  Gradual    Progression:  Worsening  Worsened by:  Nothing  Associated symptoms: swelling    Associated symptoms: no back pain, no fever and no neck pain    Associated symptoms comment:  Weeping  Risk factors: obesity        Review of Systems   Constitutional: Negative for fever.   HENT: Negative for congestion.    Respiratory: Negative for shortness of breath.    Cardiovascular: Positive for leg swelling. Negative for chest pain.   Gastrointestinal: Negative for abdominal pain, constipation, diarrhea and vomiting.   Musculoskeletal: Negative for back pain, gait problem, joint swelling, myalgias, neck pain and neck stiffness.   Skin: Negative for rash and wound.   All other systems reviewed and are negative.      Past Medical History:   Diagnosis Date   • A-fib (CMS/HCC)    • Cellulitis of both lower extremities 2018   • Elevated cholesterol    • Hypertension    • Neuropathy        No Known Allergies    Past Surgical History:   Procedure Laterality Date   • BACK SURGERY Right 2011    L1-5 fusion   • JOINT REPLACEMENT Left 2008       History  reviewed. No pertinent family history.    Social History     Socioeconomic History   • Marital status: Single     Spouse name: Not on file   • Number of children: Not on file   • Years of education: Not on file   • Highest education level: Not on file   Tobacco Use   • Smoking status: Never Smoker   Substance and Sexual Activity   • Alcohol use: No     Frequency: Never           Objective   Physical Exam   Constitutional: He is oriented to person, place, and time. He appears well-developed and well-nourished. No distress.   HENT:   Head: Normocephalic and atraumatic.   Right Ear: External ear normal.   Left Ear: External ear normal.   Nose: Nose normal.   Mouth/Throat: Oropharynx is clear and moist.   Eyes: Conjunctivae and EOM are normal. Pupils are equal, round, and reactive to light. No scleral icterus.   Neck: Normal range of motion. Neck supple. No JVD present. No thyromegaly present.   Cardiovascular: Normal rate, regular rhythm, normal heart sounds and intact distal pulses.   No murmur heard.  Pulmonary/Chest: Effort normal and breath sounds normal. No respiratory distress. He has no wheezes. He has no rales. He exhibits no tenderness.   Abdominal: Soft. Bowel sounds are normal. He exhibits no distension and no mass. There is no tenderness. There is no rebound and no guarding.   Genitourinary:   Genitourinary Comments: Jennifer swelling noted   Musculoskeletal: Normal range of motion. He exhibits edema and tenderness.   Gross swelling to the lower extremities bilaterally with blistering and weeping noted, skin sloughed on posterior aspects of the calves bilaterally; neurovascular intact   Lymphadenopathy:     He has no cervical adenopathy.   Neurological: He is alert and oriented to person, place, and time. He has normal reflexes. No cranial nerve deficit. Coordination normal.   Skin: Skin is warm. Capillary refill takes less than 2 seconds. No rash noted. He is not diaphoretic. No erythema. No pallor.    Psychiatric: He has a normal mood and affect. His behavior is normal. Judgment and thought content normal.   Nursing note and vitals reviewed.      Procedures           ED Course dr carrasco assessed patient. He was placed in kerlix wraps and ace bandages. We will prescribe keflex and have f/u with pcp as well as lymphedema clinic.                   MDM  Number of Diagnoses or Management Options  Hypertension, unspecified type: new and requires workup  Lymphedema: new and requires workup     Amount and/or Complexity of Data Reviewed  Clinical lab tests: ordered and reviewed  Decide to obtain previous medical records or to obtain history from someone other than the patient: yes  Discuss the patient with other providers: yes    Risk of Complications, Morbidity, and/or Mortality  Presenting problems: moderate  Diagnostic procedures: moderate  Management options: moderate    Patient Progress  Patient progress: improved        Final diagnoses:   Lymphedema   Hypertension, unspecified type            Indira Flowers, APRN  04/11/19 0781

## 2019-04-10 NOTE — ED NOTES
Patient is a 58 year male that presents to ER with complaints of edema to groin and below. Patient states that he has been treated at the lymphoedema clinic. Patient reports increased swelling to lower extremities with weeping.      Armand Tom RN  04/10/19 0033

## 2019-04-10 NOTE — TELEPHONE ENCOUNTER
Patient came in today with weeping from both legs.  The lower legs of the pants were soaked from the legs weeping.  I sent patient to the Emergency Department.  No lymphedema treatment was completed today.

## 2019-04-11 ENCOUNTER — HOSPITAL ENCOUNTER (EMERGENCY)
Facility: HOSPITAL | Age: 59
End: 2019-04-11

## 2019-04-11 LAB — BACTERIA BLD CULT: ABNORMAL

## 2019-04-12 ENCOUNTER — HOSPITAL ENCOUNTER (OUTPATIENT)
Dept: PHYSICAL THERAPY | Facility: HOSPITAL | Age: 59
Setting detail: THERAPIES SERIES
Discharge: HOME | End: 2019-04-12

## 2019-04-12 ENCOUNTER — TELEPHONE (OUTPATIENT)
Dept: EMERGENCY DEPT | Facility: HOSPITAL | Age: 59
End: 2019-04-12

## 2019-04-12 DIAGNOSIS — I89.0 LYMPHEDEMA OF BOTH LOWER EXTREMITIES: Primary | ICD-10-CM

## 2019-04-12 PROCEDURE — 97140 MANUAL THERAPY 1/> REGIONS: CPT | Performed by: PHYSICAL THERAPIST

## 2019-04-12 NOTE — THERAPY TREATMENT NOTE
Outpatient Physical Therapy Lymphedema Treatment Note  [facility]     Patient Name: Beni Fuchs  : 1960  MRN: [id number]  Today's Date: 2019        Visit Date: 2019    Visit Dx:    ICD-10-CM ICD-9-CM   1. Lymphedema of both lower extremities I89.0 457.1       There is no problem list on file for this patient.       Lymphedema     Row Name 19 1000             Subjective Pain    Able to rate subjective pain?  yes  -HR      Pre-Treatment Pain Level  0  -HR      Subjective Pain Comment  He has pain with pressure on the back of his calves.   -HR         Subjective Comments    Subjective Comments  He has still been weeping from his legs but it seems to be a bit better. He has not heard anything else from his ER visit or about an appointment with a kidney doctor.   -HR         Manual Lymphatic Drainage    Manual Lymphatic Drainage  initial sequence;opened regional lymph nodes;opened anastamoses;extremity treatment  -HR      Initial Sequence  short neck;abdomen;diaphragmatic breathing  -HR      Short Neck  had him practice hand placement  -HR      Abdomen  deep  -HR      Abdomen Comment  Extra time spent on abdomen  -HR      Opened Regional Lymph Nodes  axillary;inguinal  -HR      Axillary  right;left  -HR      Inguinal  right;left  -HR      Opened Anastamoses  inguino-axillary  -HR      Inguino-Axillary  right;left  -HR      Extremity Treatment  MLD to proximal limb only  -HR      MLD to Proximal Limb Only  BLE  -HR      Manual Lymphatic Drainage Comments  I did not do MLD to lower legs.  -HR         Compression/Skin Care    Compression/Skin Care Comments  I applied a non-adherent bandage and then ABD pad to the calf on each leg and secured with kerlix and flex-net.   -HR        User Key  (r) = Recorded By, (t) = Taken By, (c) = Cosigned By    Initials Name Provider Type    HR Alena Galan, PT, DPT, CLT-CHAVA Physical Therapist                    [unfilled]    PT  Assessment/Plan     Row Name 04/12/19 1000          PT Assessment    Assessment Comments  His lower legs are in better shape today than they were 2 days ago when we had him go to ER. Multiple very abnormal lab results are noted, but he was only given an antibiotic prior to being discharged. His BP continues to be uncontrollable and high. He has not heard anything about an appointment to see a kidney specialist yet. I don't think that treatment with massage and compression is going to be beneficial as I feel there is a systemic cause to his swelling that has to be addressed medically.   -HR        PT Plan    PT Plan Comments  Contact MD. Mitchell 1X/wk to assess and treat as indicated.   -HR       User Key  (r) = Recorded By, (t) = Taken By, (c) = Cosigned By    Initials Name Provider Type    Alena Shaw, PT, DPT, CLT-CHAVA Physical Therapist             Exercises     Row Name 04/12/19 1000             Subjective Comments    Subjective Comments  He has still been weeping from his legs but it seems to be a bit better. He has not heard anything else from his ER visit or about an appointment with a kidney doctor.   -HR         Subjective Pain    Able to rate subjective pain?  yes  -HR      Pre-Treatment Pain Level  0  -HR      Subjective Pain Comment  He has pain with pressure on the back of his calves.   -HR        User Key  (r) = Recorded By, (t) = Taken By, (c) = Cosigned By    Initials Name Provider Type    Alena Shaw, PT, DPT, CLT-CHAVA Physical Therapist                      PT OP Goals     Row Name 04/12/19 1000          PT Short Term Goals    STG 1  Patient will show a good understanding of the condition of lymphedema and its care.   -HR     STG 1 Progress  Ongoing  -HR     STG 2  Patient will have improvement in lower leg skin health with no further blistering.   -HR     STG 2 Progress  Ongoing  -HR     STG 2 Progress Comments  He had a huge decline with constant weeping requiring ER visit  4/10/19. He has been on antibiotic since then and the legs are slightly better but still weeping and has some open areas.   -HR     STG 3  Patient will be able to perform modified self massage and HEP for BLE lymphedema.  -HR     STG 3 Progress  Ongoing  -HR        Long Term Goals    LTG 1  Patient will be able to don and doff compression garments for BLE.  -HR     LTG 1 Progress  Ongoing  -HR     LTG 1 Progress Comments  He has help donning them and he can get them off.   -HR     LTG 2  Patient will be independent with use of a home lymphedema pump.  -HR     LTG 2 Progress  Ongoing  -HR     LTG 3  Patient will have a decrease in size of each LE by 10 cm or greater.   -HR     LTG 3 Progress  Ongoing  -HR     LTG 4  Patient will have no s/s infection.  -HR     LTG 4 Progress  Ongoing  -HR     LTG 4 Progress Comments  He still has open, excoriated tissue to the posterior lower legs that is weeping. The anterior legs have lumps and bumps, but most of the skin is intact. He has a macerated blister near the ankle on each leg.   -HR        Time Calculation    PT Goal Re-Cert Due Date  04/26/19  -HR       User Key  (r) = Recorded By, (t) = Taken By, (c) = Cosigned By    Initials Name Provider Type    HR Alena Galan, PT, DPSAMMY, BRINDA Physical Therapist          Therapy Education  Education Details: As the legs heal, and until he sees kidney doctor, he doesn't need to use the pump or compression              Time Calculation:       Therapy Charges for Today     Code Description Service Date Service Provider Modifiers Qty    31307433355 HC PT MANUAL THERAPY EA 15 MIN 4/12/2019 Alena Galan, PT, DPT, CLYEE GP 4                    Alena Galan PT, DPSAMMY, BRINDA  4/12/2019

## 2019-04-15 LAB
BACTERIA SPEC AEROBE CULT: ABNORMAL
BACTERIA SPEC AEROBE CULT: NORMAL
GRAM STN SPEC: ABNORMAL
ISOLATED FROM: ABNORMAL

## 2019-04-17 ENCOUNTER — HOSPITAL ENCOUNTER (OUTPATIENT)
Dept: PHYSICAL THERAPY | Facility: HOSPITAL | Age: 59
Setting detail: THERAPIES SERIES
Discharge: HOME | End: 2019-04-17

## 2019-04-17 DIAGNOSIS — I89.0 LYMPHEDEMA OF BOTH LOWER EXTREMITIES: Primary | ICD-10-CM

## 2019-04-17 PROCEDURE — 97140 MANUAL THERAPY 1/> REGIONS: CPT

## 2019-04-17 NOTE — THERAPY TREATMENT NOTE
Outpatient Physical Therapy Lymphedema Treatment Note  [facility]     Patient Name: Beni Fuchs  : 1960  MRN: [id number]  Today's Date: 2019        Visit Date: 2019    Visit Dx:    ICD-10-CM ICD-9-CM   1. Lymphedema of both lower extremities I89.0 457.1       There is no problem list on file for this patient.       Lymphedema     Row Name 19 0915             Subjective Pain    Able to rate subjective pain?  yes  -AL      Pre-Treatment Pain Level  0  -AL         Subjective Comments    Subjective Comments  He is weeping again both calves, not as bad as last week.  He has pain with the weeping that is on his pants, and sticks to the back of his legs.  He has an appointment with Dr. Downing on May 2nd at 3:00 pm.  He is still on the antibiotic, he has a couple a few days left for this.  The swelling in the genitals is starting to decrease.   -AL         Lymphedema Measurements    Measurement Type(s)  Circumferential  -AL      Circumferential Areas  Lower extremities  -AL         BLE Circumferential (cm)    Measurement Location 1  BOT  -AL      Left 1  25.4 cm  -AL      Right 1  25 cm  -AL      Measurement Location 2  +10 from heel base  -AL      Left 2  31.7 cm  -AL      Right 2  32.6 cm  -AL      Measurement Location 3  +10  -AL      Left 3  37.4 cm  -AL      Right 3  33.7 cm  -AL      Measurement Location 4  +10  -AL      Left 4  50 cm  -AL      Right 4  46.6 cm  -AL      Measurement Location 5  +10  -AL      Left 5  57 cm  -AL      Right 5  53.7 cm  -AL      Measurement Location 6  +10  -AL      Left 6  62.7 cm  -AL      Right 6  58.5 cm  -AL      Measurement Location 7  +10  -AL      Left 7  70.6 cm  -AL      Right 7  69.4 cm  -AL      LLE Circumferential Total  334.8 cm  -AL      RLE Circumferential Total  319.5 cm  -AL         Manual Lymphatic Drainage    Manual Lymphatic Drainage  initial sequence;opened regional lymph nodes;opened anastamoses;extremity treatment  -AL       Initial Sequence  short neck;abdomen;diaphragmatic breathing  -AL      Short Neck  had him practice hand placement  -AL      Abdomen  deep  -AL      Abdomen Comment  Extra time spent on abdomen  -AL      Opened Regional Lymph Nodes  axillary;inguinal  -AL      Axillary  right;left  -AL      Inguinal  right;left  -AL      Opened Anastamoses  inguino-axillary  -AL      Inguino-Axillary  right;left  -AL      Extremity Treatment  MLD to proximal limb only  -AL      MLD to Proximal Limb Only  BLE  -AL      Manual Lymphatic Drainage Comments  I did not do MLD to lower legs.  -AL         Compression/Skin Care    Compression/Skin Care Comments  I applied an Optiofoam bandage and then ABD pad to the calf on each leg and secured with kerlex.  -AL        User Key  (r) = Recorded By, (t) = Taken By, (c) = Cosigned By    Initials Name Provider Type    Jud Olvera PTA, CLT-LANA Physical Therapy Assistant                        PT Assessment/Plan     Row Name 04/17/19 0915          PT Assessment    Assessment Comments   Patient has open areas both lower legs, the calf area on both lower legs are weeping.  He has had an increase in size of both LE's, he does state the genital area is still swollen, but has decreased in size.  He will see a kidney specialist is 2 weeks.   -AL        PT Plan    PT Plan Comments  Will continue with treatment 1 x per week.   -AL       User Key  (r) = Recorded By, (t) = Taken By, (c) = Cosigned By    Initials Name Provider Type    Jud Olvera PTABRINDA Physical Therapy Assistant             Exercises     Row Name 04/17/19 0915             Subjective Comments    Subjective Comments  He is weeping again both calves, not as bad as last week.  He has pain with the weeping that is on his pants, and sticks to the back of his legs.  He has an appointment with Dr. Downing on May 2nd at 3:00 pm.  He is still on the antibiotic, he has a couple a few days left for this.  The swelling in the  genitals is starting to decrease.   -AL         Subjective Pain    Able to rate subjective pain?  yes  -AL      Pre-Treatment Pain Level  0  -AL         Total Minutes    22613 - PT Manual Therapy Minutes  80  -AL        User Key  (r) = Recorded By, (t) = Taken By, (c) = Cosigned By    Initials Name Provider Type    Yonatan Olveratimo ROMERO, PTA, CLT-CHAVA Physical Therapy Assistant                     Manual Rx (last 36 hours)      Manual Treatments     Row Name 04/17/19 0915             Total Minutes    30194 - PT Manual Therapy Minutes  80  -AL        User Key  (r) = Recorded By, (t) = Taken By, (c) = Cosigned By    Initials Name Provider Type    Jud Olvera HEATHER, PTA, CLT-CHAVA Physical Therapy Assistant          PT OP Goals     Row Name 04/17/19 0915          PT Short Term Goals    STG 1  Patient will show a good understanding of the condition of lymphedema and its care.   -AL     STG 1 Progress  Ongoing  -AL     STG 2  Patient will have improvement in lower leg skin health with no further blistering.   -AL     STG 2 Progress  Ongoing  -AL     STG 2 Progress Comments  He has several open areas both lower legs  -AL     STG 3  Patient will be able to perform modified self massage and HEP for BLE lymphedema.  -AL     STG 3 Progress  Ongoing  -AL        Long Term Goals    LTG 1  Patient will be able to don and doff compression garments for BLE.  -AL     LTG 1 Progress  Ongoing  -AL     LTG 2  Patient will be independent with use of a home lymphedema pump.  -AL     LTG 2 Progress  Ongoing  -AL     LTG 3  Patient will have a decrease in size of each LE by 10 cm or greater.   -AL     LTG 3 Progress  Ongoing  -AL     LTG 3 Progress Comments  He has had an increase in size of both LE's  -AL     LTG 4  Patient will have no s/s infection.  -AL     LTG 4 Progress  Ongoing  -AL        Time Calculation    PT Goal Re-Cert Due Date  04/26/19  -AL       User Key  (r) = Recorded By, (t) = Taken By, (c) = Cosigned By    Initials Name  Provider Type    AL Jud Ojeda PTA, CLT-LANA Physical Therapy Assistant          Therapy Education  Education Details: Do not use pump or compression at this time.  Given: Edema management  Program: Reinforced  How Provided: Verbal  Provided to: Patient  Level of Understanding: Verbalized              Time Calculation:   Start Time: 0915  Stop Time: 1035  Time Calculation (min): 80 min  Total Timed Code Minutes- PT: 80 minute(s)   Therapy Charges for Today     Code Description Service Date Service Provider Modifiers Qty    [id number]  PT MANUAL THERAPY EA 15 MIN 4/17/2019 Jud Ojeda PTA, CLT-LANA GP 5                    Jud Ojeda PTA, CLT-LANA  4/17/2019

## 2019-04-24 ENCOUNTER — HOSPITAL ENCOUNTER (OUTPATIENT)
Dept: PHYSICAL THERAPY | Facility: HOSPITAL | Age: 59
Setting detail: THERAPIES SERIES
Discharge: HOME | End: 2019-04-24

## 2019-04-24 DIAGNOSIS — I89.0 LYMPHEDEMA OF BOTH LOWER EXTREMITIES: Primary | ICD-10-CM

## 2019-04-24 PROCEDURE — 97140 MANUAL THERAPY 1/> REGIONS: CPT

## 2019-04-24 NOTE — THERAPY PROGRESS REPORT/RE-CERT
Outpatient Physical Therapy Lymphedema Treatment Note  [facility]     Patient Name: Beni Fuchs  : 1960  MRN: [id number]  Today's Date: 2019        Visit Date: 2019    Visit Dx:    ICD-10-CM ICD-9-CM   1. Lymphedema of both lower extremities I89.0 457.1       There is no problem list on file for this patient.       Lymphedema     Row Name 19 0915             Subjective Pain    Able to rate subjective pain?  yes  -AL      Pre-Treatment Pain Level  0  -AL         Subjective Comments    Subjective Comments  He has an ache in the legs below the knee and the feet ache.  He is able to sit down as he needs to at work.  He is still having weeping from the calf area and also the front of the lower leg.  He states the abdomen and the genital area is down in size, the legs are still tight and swollen.  He is using the pump on the abdomen and genital area, he is not using the compression garments or the pumps on the legs.  He has had an increase urine out put, but not as much as when he first takes his lasix.   He is finished with the antibiotic, tomorrow he sees Dr. Hall.   -AL         Lymphedema Measurements    Measurement Type(s)  Circumferential  -AL      Circumferential Areas  Lower extremities  -AL         BLE Circumferential (cm)    Measurement Location 1  BOT  -AL      Left 1  27.3 cm  -AL      Right 1  27.7 cm  -AL      Measurement Location 2  +10 from heel base  -AL      Left 2  33.7 cm  -AL      Right 2  33.6 cm  -AL      Measurement Location 3  +10  -AL      Left 3  38.4 cm  -AL      Right 3  33.8 cm  -AL      Measurement Location 4  +10  -AL      Left 4  52 cm  -AL      Right 4  46 cm  -AL      Measurement Location 5  +10  -AL      Left 5  56.7 cm  -AL      Right 5  54.4 cm  -AL      Measurement Location 6  +10  -AL      Left 6  66.4 cm  -AL      Right 6  58.4 cm  -AL      Measurement Location 7  +10  -AL      Left 7  71.2 cm  -AL      Right 7  70 cm  -AL      LLE Circumferential  Total  345.7 cm  -AL      RLE Circumferential Total  323.9 cm  -AL         Manual Lymphatic Drainage    Manual Lymphatic Drainage  initial sequence;opened regional lymph nodes;opened anastamoses;extremity treatment  -AL      Initial Sequence  short neck;abdomen;diaphragmatic breathing  -AL      Short Neck  had him practice hand placement  -AL      Abdomen  deep  -AL      Abdomen Comment  Extra time spent on abdomen  -AL      Opened Regional Lymph Nodes  axillary;inguinal  -AL      Axillary  right;left  -AL      Inguinal  right;left  -AL      Opened Anastamoses  inguino-axillary  -AL      Inguino-Axillary  right;left  -AL      Extremity Treatment  MLD to proximal limb only  -AL      MLD to Proximal Limb Only  BLE  -AL      Manual Lymphatic Drainage Comments  I did not do MLD to lower legs.  -AL         Compression/Skin Care    Compression/Skin Care Comments  I applied an Optiofoam bandage and then ABD pad to the calf on each leg and secured with kerlex.  -AL        User Key  (r) = Recorded By, (t) = Taken By, (c) = Cosigned By    Initials Name Provider Type    Jud Olvera PTA, CLT-LANA Physical Therapy Assistant                        PT Assessment/Plan     Row Name 04/24/19 0915          PT Assessment    Assessment Comments  Patient continues to have weeping from both lower legs.  He has had a decrease in size in the trunk, an iincrease in size in both lower legs, more so on the left.  He will see his primary Dr this week, and the kidney specialist next week.   -AL        PT Plan    PT Plan Comments  Continue with POC.   -AL       User Key  (r) = Recorded By, (t) = Taken By, (c) = Cosigned By    Initials Name Provider Type    Jud Olvera PTA, CLT-LANA Physical Therapy Assistant             Exercises     Row Name 04/24/19 0915             Subjective Comments    Subjective Comments  He has an ache in the legs below the knee and the feet ache.  He is able to sit down as he needs to at work.  He is still  having weeping from the calf area and also the front of the lower leg.  He states the abdomen and the genital area is down in size, the legs are still tight and swollen.  He is using the pump on the abdomen and genital area, he is not using the compression garments or the pumps on the legs.  He has had an increase urine out put, but not as much as when he first takes his lasix.   He is finished with the antibiotic, tomorrow he sees Dr. Hall.   -AL         Subjective Pain    Able to rate subjective pain?  yes  -AL      Pre-Treatment Pain Level  0  -AL        User Key  (r) = Recorded By, (t) = Taken By, (c) = Cosigned By    Initials Name Provider Type    AL Jud Ojeda, PTA, SARAHT-CHAVA Physical Therapy Assistant                      PT OP Goals     Row Name 04/24/19 0915          PT Short Term Goals    STG 1  Patient will show a good understanding of the condition of lymphedema and its care.   -AL     STG 1 Progress  Ongoing  -AL     STG 1 Progress Comments  Continue to educate  -AL     STG 2  Patient will have improvement in lower leg skin health with no further blistering.   -AL     STG 2 Progress  Ongoing  -AL     STG 2 Progress Comments  Has blisters and open areas that are still weeping.  -AL     STG 3  Patient will be able to perform modified self massage and HEP for BLE lymphedema.  -AL     STG 3 Progress  Ongoing  -AL     STG 3 Progress Comments  He is working on the trunk MLD  -AL        Long Term Goals    LTG 1  Patient will be able to don and doff compression garments for BLE.  -AL     LTG 1 Progress  Ongoing  -AL     LTG 2  Patient will be independent with use of a home lymphedema pump.  -AL     LTG 2 Progress  Ongoing  -AL     LTG 2 Progress Comments  He is using the pump on the trunk, not the lower legs  -AL     LTG 3  Patient will have a decrease in size of each LE by 10 cm or greater.   -AL     LTG 3 Progress  Ongoing  -AL     LTG 3 Progress Comments  He has had an increase in size in both lower legs,  more so on the left.   -AL     LTG 4  Patient will have no s/s infection.  -AL     LTG 4 Progress  Ongoing  -AL     LTG 4 Progress Comments  Has weeping open areas.   -AL        Time Calculation    PT Goal Re-Cert Due Date  05/24/19  -AL       User Key  (r) = Recorded By, (t) = Taken By, (c) = Cosigned By    Initials Name Provider Type    AL Jud Ojeda PTA, CLT-LANA Physical Therapy Assistant          Therapy Education  Education Details: Keep legs elevated  Given: Edema management  Program: Reinforced  How Provided: Verbal  Provided to: Patient  Level of Understanding: Verbalized              Time Calculation:   Start Time: 0915  Stop Time: 1035  Time Calculation (min): 80 min  Total Timed Code Minutes- PT: 80 minute(s)   Therapy Charges for Today     Code Description Service Date Service Provider Modifiers Qty    48205400402 HC PT MANUAL THERAPY EA 15 MIN 4/24/2019 Jud Ojeda PTA, CLT-LANA GP 5                    Jud Ojeda PTA, CLT-LANA  4/24/2019

## 2019-05-03 ENCOUNTER — HOSPITAL ENCOUNTER (OUTPATIENT)
Dept: PHYSICAL THERAPY | Facility: HOSPITAL | Age: 59
Setting detail: THERAPIES SERIES
Discharge: HOME | End: 2019-05-03

## 2019-05-03 DIAGNOSIS — I89.0 LYMPHEDEMA OF BOTH LOWER EXTREMITIES: Primary | ICD-10-CM

## 2019-05-03 PROCEDURE — 97140 MANUAL THERAPY 1/> REGIONS: CPT | Performed by: PHYSICAL THERAPIST

## 2019-05-03 NOTE — THERAPY TREATMENT NOTE
Outpatient Physical Therapy Lymphedema Treatment Note  [facility]     Patient Name: Beni Fuchs  : 1960  MRN: [id number]  Today's Date: 5/3/2019        Visit Date: 2019    Visit Dx:    ICD-10-CM ICD-9-CM   1. Lymphedema of both lower extremities I89.0 457.1       There is no problem list on file for this patient.       Lymphedema     Row Name 19 0800             Subjective Pain    Able to rate subjective pain?  yes  -HR      Pre-Treatment Pain Level  0  -HR         Subjective Comments    Subjective Comments  His right leg continues to weep the most. The backs of both legs are painful if they rub up against something.   -HR         Manual Lymphatic Drainage    Manual Lymphatic Drainage  initial sequence;opened regional lymph nodes;opened anastamoses;extremity treatment  -HR      Initial Sequence  short neck;abdomen;diaphragmatic breathing  -HR      Short Neck  --  -HR      Abdomen  deep  -HR      Opened Regional Lymph Nodes  axillary;inguinal  -HR      Axillary  right;left  -HR      Inguinal  right;left  -HR      Opened Anastamoses  inguino-axillary  -HR      Inguino-Axillary  right;left  -HR      Extremity Treatment  MLD to full limb;extremity treatment focus on  -HR      MLD to Full Limb  BLE  -HR      Extremity Treatment Focus On  proximal legs with MLD to intact skin only along lower legs.   -HR         Compression/Skin Care    Compression/Skin Care  skin care  -HR      Skin Care  moisturizing lotion applied hydraphor  -HR      Compression/Skin Care Comments  Applied ABD pad to posterior lower legs and secured each with kerlix.   -HR        User Key  (r) = Recorded By, (t) = Taken By, (c) = Cosigned By    Initials Name Provider Type    HR Alena Galan, PT, DPT, CLT-CHAVA Physical Therapist                        PT Assessment/Plan     Row Name 19 0800          PT Assessment    Assessment Comments  We are really just trying to maintain the health of the tissue and skin  in the legs while we wait on results from extensive labs ordered by kidney specialist.   -HR        PT Plan    PT Plan Comments  Cont POC. Assess again in a week.   -HR       User Key  (r) = Recorded By, (t) = Taken By, (c) = Cosigned By    Initials Name Provider Type    HR Alena Galan, PT, DPT, CLT-CHAVA Physical Therapist             Exercises     Row Name 05/03/19 0800             Subjective Comments    Subjective Comments  His right leg continues to weep the most. The backs of both legs are painful if they rub up against something.   -HR         Subjective Pain    Able to rate subjective pain?  yes  -HR      Pre-Treatment Pain Level  0  -HR        User Key  (r) = Recorded By, (t) = Taken By, (c) = Cosigned By    Initials Name Provider Type    HR Alena Galan, PT, DPT, CLT-CHAVA Physical Therapist                      PT OP Goals     Row Name 05/03/19 0800          PT Short Term Goals    STG 1  Patient will show a good understanding of the condition of lymphedema and its care.   -HR     STG 1 Progress  Ongoing  -HR     STG 2  Patient will have improvement in lower leg skin health with no further blistering.   -HR     STG 2 Progress  Ongoing  -HR     STG 3  Patient will be able to perform modified self massage and HEP for BLE lymphedema.  -HR     STG 3 Progress  Ongoing  -HR        Long Term Goals    LTG 1  Patient will be able to don and doff compression garments for BLE.  -HR     LTG 1 Progress  Ongoing  -HR     LTG 2  Patient will be independent with use of a home lymphedema pump.  -HR     LTG 2 Progress  Ongoing  -HR     LTG 3  Patient will have a decrease in size of each LE by 10 cm or greater.   -HR     LTG 3 Progress  Ongoing  -HR     LTG 4  Patient will have no s/s infection.  -HR     LTG 4 Progress  Ongoing  -HR        Time Calculation    PT Goal Re-Cert Due Date  05/24/19  -HR       User Key  (r) = Recorded By, (t) = Taken By, (c) = Cosigned By    Initials Name Provider Type    HR  Alena Galan, PT, DPT, BRINDA Physical Therapist          Therapy Education  Education Details: cont what he has been doing for the past few weeks until results are back from kidney doctor              Time Calculation:       Therapy Charges for Today     Code Description Service Date Service Provider Modifiers Qty    74409982841 HC PT MANUAL THERAPY EA 15 MIN 5/3/2019 Alena Galan, PT, DPT, BRINDA GP 5                    Alena Galan PT, DPT, BRINDA  5/3/2019

## 2019-05-10 ENCOUNTER — HOSPITAL ENCOUNTER (OUTPATIENT)
Dept: PHYSICAL THERAPY | Facility: HOSPITAL | Age: 59
Setting detail: THERAPIES SERIES
Discharge: HOME | End: 2019-05-10

## 2019-05-10 DIAGNOSIS — I89.0 LYMPHEDEMA OF BOTH LOWER EXTREMITIES: Primary | ICD-10-CM

## 2019-05-10 PROCEDURE — 97140 MANUAL THERAPY 1/> REGIONS: CPT | Performed by: PHYSICAL THERAPIST

## 2019-05-10 NOTE — THERAPY TREATMENT NOTE
Outpatient Physical Therapy Lymphedema Treatment Note  [facility]     Patient Name: Beni Fuchs  : 1960  MRN: [id number]  Today's Date: 5/10/2019        Visit Date: 05/10/2019    Visit Dx:    ICD-10-CM ICD-9-CM   1. Lymphedema of both lower extremities I89.0 457.1       There is no problem list on file for this patient.       Lymphedema     Row Name 05/10/19 0900             Subjective Pain    Able to rate subjective pain?  yes  -HR      Pre-Treatment Pain Level  0  -HR         Subjective Comments    Subjective Comments  He says the blisters are back and his legs are still weeping.  -HR         Skin Changes/Observations    Skin Observations Comment  Photos taken. In media section.   -HR         Lymphedema Measurements    Lymphedema Measurements Comments  /88  -HR         Manual Lymphatic Drainage    Manual Lymphatic Drainage  initial sequence;opened regional lymph nodes;opened anastamoses;extremity treatment  -HR      Initial Sequence  short neck;abdomen;diaphragmatic breathing  -HR      Abdomen  deep  -HR      Opened Regional Lymph Nodes  axillary;inguinal  -HR      Axillary  right;left  -HR      Inguinal  right;left  -HR      Opened Anastamoses  inguino-axillary  -HR      Inguino-Axillary  right;left  -HR      Extremity Treatment  MLD to full limb;extremity treatment focus on  -HR      MLD to Full Limb  BLE  -HR      Extremity Treatment Focus On  proximal legs with MLD to intact skin only along lower legs.   -HR         Compression/Skin Care    Compression/Skin Care  skin care  -HR      Skin Care  moisturizing lotion applied  -HR      Compression/Skin Care Comments  Applied ABD pad to posterior lower legs and secured each with kerlix.   -HR        User Key  (r) = Recorded By, (t) = Taken By, (c) = Cosigned By    Initials Name Provider Type    HR Alena Galan, PT, DPT, CLT-CHAVA Physical Therapist                        PT Assessment/Plan     Row Name 05/10/19 0900          PT  Assessment    Assessment Comments  The posterior lower legs continue to weep. His BP is running slightly lower which is good. He has the ultrasound of his legs scheduled for 5/16/2019 and will return to us on the 17th.  -HR        PT Plan    PT Plan Comments  Cont POC. Assess again in a week.   -HR       User Key  (r) = Recorded By, (t) = Taken By, (c) = Cosigned By    Initials Name Provider Type    HR Alena Galan, PT, DPT, CLT-CHAVA Physical Therapist             Exercises     Row Name 05/10/19 0900             Subjective Comments    Subjective Comments  He says the blisters are back and his legs are still weeping.  -HR         Subjective Pain    Able to rate subjective pain?  yes  -HR      Pre-Treatment Pain Level  0  -HR        User Key  (r) = Recorded By, (t) = Taken By, (c) = Cosigned By    Initials Name Provider Type    HR Alena Galan, PT, DPT, CLT-CHAVA Physical Therapist                      PT OP Goals     Row Name 05/10/19 0900          PT Short Term Goals    STG 1  Patient will show a good understanding of the condition of lymphedema and its care.   -HR     STG 1 Progress  Ongoing  -HR     STG 2  Patient will have improvement in lower leg skin health with no further blistering.   -HR     STG 2 Progress  Ongoing  -HR     STG 3  Patient will be able to perform modified self massage and HEP for BLE lymphedema.  -HR     STG 3 Progress  Ongoing  -HR        Long Term Goals    LTG 1  Patient will be able to don and doff compression garments for BLE.  -HR     LTG 1 Progress  Ongoing  -HR     LTG 2  Patient will be independent with use of a home lymphedema pump.  -HR     LTG 2 Progress  Ongoing  -HR     LTG 3  Patient will have a decrease in size of each LE by 10 cm or greater.   -HR     LTG 3 Progress  Ongoing  -HR     LTG 4  Patient will have no s/s infection.  -HR     LTG 4 Progress  Ongoing  -HR        Time Calculation    PT Goal Re-Cert Due Date  05/24/19  -HR       User Key  (r) = Recorded  By, (t) = Taken By, (c) = Cosigned By    Initials Name Provider Type    HR Alena Galan, PT, KAMALA, BRINDA Physical Therapist          Therapy Education  Education Details: cont what he has been doing for the past few weeks until results are back from kidney doctor              Time Calculation:       Therapy Charges for Today     Code Description Service Date Service Provider Modifiers Qty    39136837238 HC PT MANUAL THERAPY EA 15 MIN 5/10/2019 Alena Galan PT, KAMALA, BRINDA GP 4                    Alena Galan PT, KAMALA, BRINDA  5/10/2019

## 2019-05-16 ENCOUNTER — HOSPITAL ENCOUNTER (OUTPATIENT)
Dept: VASCULAR LAB | Age: 59
Discharge: HOME | End: 2019-05-16
Payer: COMMERCIAL

## 2019-05-16 ENCOUNTER — HOSPITAL ENCOUNTER (OUTPATIENT)
Dept: ULTRASOUND IMAGING | Age: 59
Discharge: HOME | End: 2019-05-16
Payer: COMMERCIAL

## 2019-05-16 DIAGNOSIS — N18.30 CHRONIC KIDNEY DISEASE, STAGE III (MODERATE) (HCC): ICD-10-CM

## 2019-05-16 DIAGNOSIS — I89.0 LYMPHEDEMA: Primary | ICD-10-CM

## 2019-05-16 PROCEDURE — 93975 VASCULAR STUDY: CPT

## 2019-05-16 PROCEDURE — 93970 EXTREMITY STUDY: CPT

## 2019-05-17 ENCOUNTER — HOSPITAL ENCOUNTER (OUTPATIENT)
Dept: PHYSICAL THERAPY | Facility: HOSPITAL | Age: 59
Setting detail: THERAPIES SERIES
Discharge: HOME | End: 2019-05-17

## 2019-05-17 DIAGNOSIS — I89.0 LYMPHEDEMA OF BOTH LOWER EXTREMITIES: Primary | ICD-10-CM

## 2019-05-17 PROCEDURE — 97140 MANUAL THERAPY 1/> REGIONS: CPT | Performed by: PHYSICAL THERAPIST

## 2019-05-17 NOTE — THERAPY PROGRESS REPORT/RE-CERT
Physical Therapy Lymphedema Re-Assessment  [facility]     Patient Name: Beni Fuchs  : 1960  MRN: [id number]  Today's Date: 2019      Visit Date: 2019    Visit Dx:    ICD-10-CM ICD-9-CM   1. Lymphedema of both lower extremities I89.0 457.1       There is no problem list on file for this patient.       Past Medical History:   Diagnosis Date   • A-fib (CMS/HCC)    • Cellulitis of both lower extremities    • Elevated cholesterol    • Hypertension    • Neuropathy         Past Surgical History:   Procedure Laterality Date   • BACK SURGERY Right     L1-5 fusion   • JOINT REPLACEMENT Left        Visit Dx:    ICD-10-CM ICD-9-CM   1. Lymphedema of both lower extremities I89.0 457.1           Lymphedema     Row Name 19 0900             Subjective Pain    Able to rate subjective pain?  yes  -HR      Pre-Treatment Pain Level  0  -HR         Subjective Comments    Subjective Comments  His legs are still weeping. He had his kidney ultrasound and his legs done yesterday.   -HR         Lymphedema Measurements    Measurement Type(s)  Circumferential  -HR         BLE Circumferential (cm)    Left 3  37.4 cm  -HR      Right 3  35.6 cm  -HR      Left 5  56 cm  -HR      Right 5  53.7 cm  -HR      LLE Circumferential Total  93.4 cm  -HR      RLE Circumferential Total  89.3 cm  -HR         Manual Lymphatic Drainage    Manual Lymphatic Drainage  initial sequence;opened regional lymph nodes;opened anastamoses;extremity treatment  -HR      Initial Sequence  short neck;abdomen;diaphragmatic breathing  -HR      Abdomen  deep  -HR      Opened Regional Lymph Nodes  axillary;inguinal  -HR      Axillary  right;left  -HR      Inguinal  right;left  -HR      Opened Anastamoses  inguino-axillary  -HR      Inguino-Axillary  right;left  -HR      Extremity Treatment  MLD to full limb;extremity treatment focus on  -HR      MLD to Full Limb  BLE  -HR      Extremity Treatment Focus On  proximal legs with MLD to  intact skin only along lower legs.   -HR         Compression/Skin Care    Compression/Skin Care  skin care  -HR      Skin Care  moisturizing lotion applied  -HR      Compression/Skin Care Comments  Applied ABD pad to posterior lower legs and secured each with kerlix.   -HR        User Key  (r) = Recorded By, (t) = Taken By, (c) = Cosigned By    Initials Name Provider Type    HR Alena Galan, PT, DPT, CLT-CHAVA Physical Therapist                          Therapy Education  Education Details: He may begin using pump on one leg per day. Option to do trunk, then leg, then trunk again.       Exercises     Row Name 05/17/19 0900             Subjective Comments    Subjective Comments  His legs are still weeping. He had his kidney ultrasound and his legs done yesterday.   -HR         Subjective Pain    Able to rate subjective pain?  yes  -HR      Pre-Treatment Pain Level  0  -HR        User Key  (r) = Recorded By, (t) = Taken By, (c) = Cosigned By    Initials Name Provider Type    HR Alena Galan, PT, DPT, CLT-CHAVA Physical Therapist                      PT OP Goals     Row Name 05/17/19 0900          PT Short Term Goals    STG 1  Patient will show a good understanding of the condition of lymphedema and its care.   -HR     STG 1 Progress  Ongoing  -HR     STG 2  Patient will have improvement in lower leg skin health with no further blistering.   -HR     STG 2 Progress  Ongoing  -HR     STG 3  Patient will be able to perform modified self massage and HEP for BLE lymphedema.  -HR     STG 3 Progress  Ongoing  -HR        Long Term Goals    LTG 1  Patient will be able to don and doff compression garments for BLE.  -HR     LTG 1 Progress  Ongoing  -HR     LTG 2  Patient will be independent with use of a home lymphedema pump.  -HR     LTG 2 Progress  Ongoing  -HR     LTG 3  Patient will have a decrease in size of each LE by 10 cm or greater.   -HR     LTG 3 Progress  Ongoing  -HR     LTG 4  Patient will have no  s/s infection.  -HR     LTG 4 Progress  Ongoing  -HR        Time Calculation    PT Goal Re-Cert Due Date  06/16/19  -HR       User Key  (r) = Recorded By, (t) = Taken By, (c) = Cosigned By    Initials Name Provider Type    HR Alena Galan, PT, DPT, CLT-CHAVA Physical Therapist          PT Assessment/Plan     Row Name 05/17/19 0900          PT Assessment    Assessment Comments  There has not been much change with Mr. Light's legs over the past 2 weeks. He has had all of the testing the kidney specialist ordered but doesn't go back for 2 weeks to get results and further and any treatment changes.   -HR        PT Plan    PT Plan Comments  Scheduled patient for after her follows up with the kidney specialist in 2 weeks.   -HR       User Key  (r) = Recorded By, (t) = Taken By, (c) = Cosigned By    Initials Name Provider Type    HR Alena Galan, PT, DPT, CLT-CHAVA Physical Therapist                       Time Calculation:       Therapy Charges for Today     Code Description Service Date Service Provider Modifiers Qty    39430228823  PT MANUAL THERAPY EA 15 MIN 5/17/2019 Alena Galan, PT, DPT, CLT-CHAVA GP 5                    Alena Galan PT, DPT, CLT-CHAVA  5/17/2019

## 2019-06-03 ENCOUNTER — HOSPITAL ENCOUNTER (OUTPATIENT)
Dept: PHYSICAL THERAPY | Facility: HOSPITAL | Age: 59
Setting detail: THERAPIES SERIES
Discharge: HOME | End: 2019-06-03

## 2019-06-03 DIAGNOSIS — I89.0 LYMPHEDEMA OF BOTH LOWER EXTREMITIES: Primary | ICD-10-CM

## 2019-06-03 PROCEDURE — 97140 MANUAL THERAPY 1/> REGIONS: CPT | Performed by: PHYSICAL THERAPIST

## 2019-06-03 NOTE — THERAPY PROGRESS REPORT/RE-CERT
Outpatient Physical Therapy Lymphedema Progress Note  [facility]     Patient Name: Beni Fuchs  : 1960  MRN: [id number]  Today's Date: 6/3/2019        Visit Date: 2019    Visit Dx:    ICD-10-CM ICD-9-CM   1. Lymphedema of both lower extremities I89.0 457.1       There is no problem list on file for this patient.       Lymphedema     Row Name 19 09             Subjective Pain    Able to rate subjective pain?  yes  -HR      Pre-Treatment Pain Level  2  -HR      Subjective Pain Comment  calf on both legs  -HR         Subjective Comments    Subjective Comments  His legs have been weeping even more than ever although he can tell that the legs are smaller. He is using puppy pads under his legs at home and goes through 2 of them a day.   -HR         Manual Lymphatic Drainage    Manual Lymphatic Drainage  initial sequence;opened regional lymph nodes;opened anastamoses;extremity treatment  -HR      Initial Sequence  short neck;abdomen;diaphragmatic breathing  -HR      Abdomen  deep  -HR      Opened Regional Lymph Nodes  axillary;inguinal  -HR      Axillary  right;left  -HR      Inguinal  right;left  -HR      Opened Anastamoses  inguino-axillary  -HR      Inguino-Axillary  right;left  -HR      Extremity Treatment  MLD to proximal limb only  -HR      MLD to Proximal Limb Only  BLE. So much of the tissue is macerated on the lower legs I did not do MLD  -HR         Compression/Skin Care    Compression/Skin Care Comments  2 ABD pads and roll of kerlix to R lower leg. 1 foam piece and 1 non-adherent pad to L with roll of kerlix to secure.   -HR        User Key  (r) = Recorded By, (t) = Taken By, (c) = Cosigned By    Initials Name Provider Type    HR Alena Galan, PT, DPT, CLT-CHAVA Physical Therapist                        PT Assessment/Plan     Row Name 19 0900          PT Assessment    Assessment Comments  The lymphorrhea has increased and the skin to his lower legs is very  macerated. There is an odor now that smells like urine but he is not having any issues with incontinence. Not sure why weeping fluid smells of urea, but it does. He is supposed to have more bloodwork done in 2 weeks and see the kidney doctor again then. He has not heard from anyone about a vascular consult.   -HR        PT Plan    PT Plan Comments  Will return after further testing is completed next month.   -HR       User Key  (r) = Recorded By, (t) = Taken By, (c) = Cosigned By    Initials Name Provider Type    HR Alena Galan, PT, DPT, CLT-CHAVA Physical Therapist             Exercises     Row Name 06/03/19 0900             Subjective Comments    Subjective Comments  His legs have been weeping even more than ever although he can tell that the legs are smaller. He is using puppy pads under his legs at home and goes through 2 of them a day.   -HR         Subjective Pain    Able to rate subjective pain?  yes  -HR      Pre-Treatment Pain Level  2  -HR      Subjective Pain Comment  calf on both legs  -HR        User Key  (r) = Recorded By, (t) = Taken By, (c) = Cosigned By    Initials Name Provider Type    HR Alena Galan, PT, DPT, CLT-CHAVA Physical Therapist                      PT OP Goals     Row Name 06/03/19 0900          PT Short Term Goals    STG 1  Patient will show a good understanding of the condition of lymphedema and its care.   -HR     STG 1 Progress  Ongoing  -HR     STG 2  Patient will have improvement in lower leg skin health with no further blistering.   -HR     STG 2 Progress  Ongoing  -HR     STG 3  Patient will be able to perform modified self massage and HEP for BLE lymphedema.  -HR     STG 3 Progress  Ongoing  -HR        Long Term Goals    LTG 1  Patient will be able to don and doff compression garments for BLE.  -HR     LTG 1 Progress  Ongoing  -HR     LTG 2  Patient will be independent with use of a home lymphedema pump.  -HR     LTG 2 Progress  Ongoing  -HR     LTG 3  Patient  will have a decrease in size of each LE by 10 cm or greater.   -HR     LTG 3 Progress  Ongoing  -HR     LTG 4  Patient will have no s/s infection.  -HR     LTG 4 Progress  Ongoing  -HR        Time Calculation    PT Goal Re-Cert Due Date  07/03/19  -HR       User Key  (r) = Recorded By, (t) = Taken By, (c) = Cosigned By    Initials Name Provider Type    HR Alena Galan, PT, DPT, BRINDA Physical Therapist          Therapy Education  Education Details: Discussed possible benefits of ketogenic diet for lymphatic disease. Gave him published information to review on his own.               Time Calculation:       Therapy Charges for Today     Code Description Service Date Service Provider Modifiers Qty    31885841708 HC PT MANUAL THERAPY EA 15 MIN 6/3/2019 Alena Galan, PT, DPT, BRINDA GP 5                    Alena Galan PT, DPT, BRINDA  6/3/2019

## 2019-06-25 LAB
ALBUMIN SERPL-MCNC: 2.6 G/DL (ref 3.5–5.2)
ANION GAP SERPL CALCULATED.3IONS-SCNC: 11 MMOL/L (ref 7–19)
BUN BLDV-MCNC: 25 MG/DL (ref 6–20)
CALCIUM SERPL-MCNC: 9 MG/DL (ref 8.6–10)
CHLORIDE BLD-SCNC: 107 MMOL/L (ref 98–111)
CO2: 24 MMOL/L (ref 22–29)
CREAT SERPL-MCNC: 1.7 MG/DL (ref 0.5–1.2)
GFR NON-AFRICAN AMERICAN: 41
GLUCOSE BLD-MCNC: 101 MG/DL (ref 74–109)
PHOSPHORUS: 5 MG/DL (ref 2.5–4.5)
POTASSIUM SERPL-SCNC: 5.5 MMOL/L (ref 3.5–5)
SODIUM BLD-SCNC: 142 MMOL/L (ref 136–145)

## 2019-07-08 ENCOUNTER — HOSPITAL ENCOUNTER (OUTPATIENT)
Dept: PHYSICAL THERAPY | Facility: HOSPITAL | Age: 59
Setting detail: THERAPIES SERIES
Discharge: HOME | End: 2019-07-08

## 2019-07-08 DIAGNOSIS — I89.0 LYMPHEDEMA OF BOTH LOWER EXTREMITIES: Primary | ICD-10-CM

## 2019-07-08 PROCEDURE — 97140 MANUAL THERAPY 1/> REGIONS: CPT | Performed by: PHYSICAL THERAPIST

## 2019-07-08 NOTE — THERAPY PROGRESS REPORT/RE-CERT
Outpatient Physical Therapy Lymphedema Progress Note  [facility]     Patient Name: Beni Fuchs  : 1960  MRN: [id number]  Today's Date: 2019        Visit Date: 2019    Visit Dx:    ICD-10-CM ICD-9-CM   1. Lymphedema of both lower extremities I89.0 457.1       There is no problem list on file for this patient.       Lymphedema     Row Name 19 0800             Subjective Pain    Able to rate subjective pain?  yes  -HR      Pre-Treatment Pain Level  0  -HR         Subjective Comments    Subjective Comments  He has to have a kidney biopsy. His legs haven't been weeping for a couple of weeks now.   -HR         Skin Changes/Observations    Skin Observations Comment  Photos taken  -HR         Lymphedema Measurements    Measurement Type(s)  Circumferential  -HR         BLE Circumferential (cm)    Measurement Location 1  BOT  -HR      Left 1  27.5 cm  -HR      Right 1  27.5 cm  -HR      Measurement Location 2  +10 from heel base  -HR      Left 2  34 cm  -HR      Right 2  33.2 cm  -HR      Measurement Location 3  +10  -HR      Left 3  34.9 cm  -HR      Right 3  32.3 cm  -HR      Measurement Location 4  +10  -HR      Left 4  45.8 cm  -HR      Right 4  43.5 cm  -HR      Measurement Location 5  +10  -HR      Left 5  54.4 cm  -HR      Right 5  52.8 cm  -HR      Measurement Location 6  +10  -HR      Left 6  57.4 cm  -HR      Right 6  60 cm  -HR      Measurement Location 7  +10  -HR      LLE Circumferential Total  254 cm  -HR      RLE Circumferential Total  249.3 cm  -HR         Manual Lymphatic Drainage    Manual Lymphatic Drainage  initial sequence;opened regional lymph nodes;opened anastamoses;extremity treatment  -HR      Initial Sequence  short neck;abdomen;diaphragmatic breathing  -HR      Abdomen  deep  -HR      Opened Regional Lymph Nodes  axillary;inguinal  -HR      Axillary  right;left  -HR      Inguinal  right;left  -HR      Opened Anastamoses  inguino-axillary  -HR      Inguino-Axillary   right;left  -HR      Extremity Treatment  MLD to proximal limb only  -HR      MLD to Full Limb  BLE  -HR         Compression/Skin Care    Compression/Skin Care  skin care  -HR      Skin Care  moisturizing lotion applied  -HR      Compression/Skin Care Comments  Applied hydraphor to the dry, flaky skin on the lower legs. Tried to remove some of the hard nonviable skin to prevent it getting ripped off and causing an opening of the skin.   -HR        User Key  (r) = Recorded By, (t) = Taken By, (c) = Cosigned By    Initials Name Provider Type    Alena Shaw, PT, DPT, CLT-CHAVA Physical Therapist                        PT Assessment/Plan     Row Name 07/08/19 0800          PT Assessment    Assessment Comments  His legs are in better shape than they were last month. The kidney specialist has changed his BP medication and is going to have a kidney biopsy done. This is all going in the right direction now to try to find the root cause of the swelling and massive weight gain. He is not currently using the pump or any compression while all of the testing is done.   -HR        PT Plan    PT Plan Comments  Placing him on hold at this time. Plan is for him to contact us once kidney workup is complete and see if we can assist in his treatment any further.   -HR       User Key  (r) = Recorded By, (t) = Taken By, (c) = Cosigned By    Initials Name Provider Type    Alena Shaw, PT, DPT, CLTHusseinCHAVA Physical Therapist                     Exercises     Row Name 07/08/19 0800             Subjective Comments    Subjective Comments  He has to have a kidney biopsy. His legs haven't been weeping for a couple of weeks now.   -HR         Subjective Pain    Able to rate subjective pain?  yes  -HR      Pre-Treatment Pain Level  0  -HR        User Key  (r) = Recorded By, (t) = Taken By, (c) = Cosigned By    Initials Name Provider Type    Alena Shaw, PT, DPT, CLCHIKICHAVA Physical Therapist                       PT OP Goals     Row Name 07/08/19 0800          PT Short Term Goals    STG 1  Patient will show a good understanding of the condition of lymphedema and its care.   -HR     STG 1 Progress  Ongoing  -HR     STG 2  Patient will have improvement in lower leg skin health with no further blistering.   -HR     STG 2 Progress  Ongoing  -HR     STG 2 Progress Comments  He still has blisters and bumps on the legs but nothing is draining at this time.   -HR     STG 3  Patient will be able to perform modified self massage and HEP for BLE lymphedema.  -HR     STG 3 Progress  Ongoing  -HR        Long Term Goals    LTG 1  Patient will be able to don and doff compression garments for BLE.  -HR     LTG 1 Progress  Ongoing  -HR     LTG 2  Patient will be independent with use of a home lymphedema pump.  -HR     LTG 2 Progress  Ongoing  -HR     LTG 3  Patient will have a decrease in size of each LE by 10 cm or greater.   -HR     LTG 3 Progress  Ongoing  -HR     LTG 3 Progress Comments  He has had a decrease in the LLE of 30 cm since 4/24.   -HR     LTG 4  Patient will have no s/s infection.  -HR     LTG 4 Progress  Ongoing  -HR        Time Calculation    PT Goal Re-Cert Due Date  10/06/19  -HR       User Key  (r) = Recorded By, (t) = Taken By, (c) = Cosigned By    Initials Name Provider Type    HR Alena Galan, PT, DPT, BRINDA Physical Therapist          Therapy Education  Education Details: Contact us when kidney results are in.               Time Calculation:       Therapy Charges for Today     Code Description Service Date Service Provider Modifiers Qty    47199746969 HC PT MANUAL THERAPY EA 15 MIN 7/8/2019 Alena Galan, PT, DPT, BRINDA GP 5                    Alena Galan, PT, DPT, BRINDA  7/8/2019

## 2019-08-02 ENCOUNTER — HOSPITAL ENCOUNTER (OUTPATIENT)
Dept: CT IMAGING | Age: 59
Discharge: HOME | End: 2019-08-02
Payer: COMMERCIAL

## 2019-08-02 VITALS
TEMPERATURE: 99.2 F | BODY MASS INDEX: 45.1 KG/M2 | HEIGHT: 70 IN | HEART RATE: 80 BPM | DIASTOLIC BLOOD PRESSURE: 102 MMHG | RESPIRATION RATE: 17 BRPM | WEIGHT: 315 LBS | OXYGEN SATURATION: 96 % | SYSTOLIC BLOOD PRESSURE: 207 MMHG

## 2019-08-02 DIAGNOSIS — N18.30 CHRONIC KIDNEY DISEASE, STAGE III (MODERATE) (HCC): ICD-10-CM

## 2019-08-02 LAB
HCT VFR BLD CALC: 32.3 % (ref 42–52)
HEMOGLOBIN: 9.6 G/DL (ref 14–18)
INR BLD: 1.07 (ref 0.88–1.18)
MCH RBC QN AUTO: 28.4 PG (ref 27–31)
MCHC RBC AUTO-ENTMCNC: 29.7 G/DL (ref 33–37)
MCV RBC AUTO: 95.6 FL (ref 80–94)
PDW BLD-RTO: 15 % (ref 11.5–14.5)
PLATELET # BLD: 315 K/UL (ref 130–400)
PMV BLD AUTO: 10.2 FL (ref 9.4–12.4)
PROTHROMBIN TIME: 13.3 SEC (ref 12–14.6)
RBC # BLD: 3.38 M/UL (ref 4.7–6.1)
WBC # BLD: 11.2 K/UL (ref 4.8–10.8)

## 2019-08-02 PROCEDURE — 85027 COMPLETE CBC AUTOMATED: CPT

## 2019-08-02 PROCEDURE — 2709999900 CT GUIDED NEEDLE PLACEMENT

## 2019-08-02 PROCEDURE — 85610 PROTHROMBIN TIME: CPT

## 2019-08-02 PROCEDURE — 6360000002 HC RX W HCPCS: Performed by: RADIOLOGY

## 2019-08-02 PROCEDURE — 2500000003 HC RX 250 WO HCPCS: Performed by: RADIOLOGY

## 2019-08-02 RX ORDER — FENTANYL CITRATE 50 UG/ML
INJECTION, SOLUTION INTRAMUSCULAR; INTRAVENOUS
Status: DISPENSED
Start: 2019-08-02 | End: 2019-08-02

## 2019-08-02 RX ORDER — BUMETANIDE 1 MG/1
1.5 TABLET ORAL
COMMUNITY
End: 2020-10-19 | Stop reason: ALTCHOICE

## 2019-08-02 RX ORDER — HYDRALAZINE HYDROCHLORIDE 50 MG/1
50 TABLET, FILM COATED ORAL
COMMUNITY
End: 2020-10-19 | Stop reason: ALTCHOICE

## 2019-08-02 RX ORDER — MIDAZOLAM HYDROCHLORIDE 1 MG/ML
INJECTION INTRAMUSCULAR; INTRAVENOUS
Status: DISPENSED
Start: 2019-08-02 | End: 2019-08-02

## 2019-08-02 RX ORDER — SODIUM CHLORIDE 0.9 % (FLUSH) 0.9 %
10 SYRINGE (ML) INJECTION PRN
Status: DISCONTINUED | OUTPATIENT
Start: 2019-08-02 | End: 2019-08-04 | Stop reason: HOSPADM

## 2019-08-02 RX ORDER — FENTANYL CITRATE 50 UG/ML
INJECTION, SOLUTION INTRAMUSCULAR; INTRAVENOUS
Status: COMPLETED | OUTPATIENT
Start: 2019-08-02 | End: 2019-08-02

## 2019-08-02 RX ORDER — LEVOTHYROXINE SODIUM 88 UG/1
88 TABLET ORAL DAILY
COMMUNITY

## 2019-08-02 RX ORDER — LISINOPRIL 40 MG/1
40 TABLET ORAL
Status: ON HOLD | COMMUNITY
End: 2020-10-26

## 2019-08-02 RX ORDER — METOPROLOL TARTRATE 50 MG/1
50 TABLET, FILM COATED ORAL
COMMUNITY
End: 2020-10-19 | Stop reason: ALTCHOICE

## 2019-08-02 RX ORDER — NEBIVOLOL 10 MG/1
TABLET ORAL DAILY
COMMUNITY
End: 2020-10-19 | Stop reason: ALTCHOICE

## 2019-08-02 RX ORDER — LIDOCAINE HYDROCHLORIDE 10 MG/ML
INJECTION, SOLUTION EPIDURAL; INFILTRATION; INTRACAUDAL; PERINEURAL
Status: COMPLETED | OUTPATIENT
Start: 2019-08-02 | End: 2019-08-02

## 2019-08-02 RX ORDER — CLONIDINE HYDROCHLORIDE 0.2 MG/1
0.2 TABLET ORAL
COMMUNITY
End: 2020-10-19 | Stop reason: ALTCHOICE

## 2019-08-02 RX ORDER — LIDOCAINE HYDROCHLORIDE 10 MG/ML
INJECTION, SOLUTION EPIDURAL; INFILTRATION; INTRACAUDAL; PERINEURAL
Status: DISPENSED
Start: 2019-08-02 | End: 2019-08-02

## 2019-08-02 RX ORDER — MIDAZOLAM HYDROCHLORIDE 1 MG/ML
INJECTION INTRAMUSCULAR; INTRAVENOUS
Status: COMPLETED | OUTPATIENT
Start: 2019-08-02 | End: 2019-08-02

## 2019-08-02 RX ADMIN — Medication 25 MCG: at 10:37

## 2019-08-02 RX ADMIN — LIDOCAINE HYDROCHLORIDE 5 ML: 10 INJECTION, SOLUTION EPIDURAL; INFILTRATION; INTRACAUDAL; PERINEURAL at 10:35

## 2019-08-02 RX ADMIN — MIDAZOLAM HYDROCHLORIDE 1 MG: 1 INJECTION, SOLUTION INTRAMUSCULAR; INTRAVENOUS at 10:34

## 2019-08-02 RX ADMIN — Medication 25 MCG: at 10:34

## 2019-08-02 SDOH — HEALTH STABILITY: MENTAL HEALTH: HOW OFTEN DO YOU HAVE A DRINK CONTAINING ALCOHOL?: NEVER

## 2019-08-02 ASSESSMENT — PAIN - FUNCTIONAL ASSESSMENT: PAIN_FUNCTIONAL_ASSESSMENT: 0-10

## 2019-08-02 NOTE — PROGRESS NOTES
Patient returned from CT in supine position with HOB 20 degrees. Pressure applied to biopsy site. Patient is not in distress or pain. Site is clean, bandage is clean, dry, and intact. Given a soda to drink. He is resting comfortably in bed.

## 2019-08-06 LAB
ALBUMIN SERPL-MCNC: 2.5 G/DL (ref 3.5–5.2)
ALP BLD-CCNC: 64 U/L (ref 40–130)
ALT SERPL-CCNC: 7 U/L (ref 5–41)
ANION GAP SERPL CALCULATED.3IONS-SCNC: 11 MMOL/L (ref 7–19)
AST SERPL-CCNC: 12 U/L (ref 5–40)
BASOPHILS ABSOLUTE: 0.2 K/UL (ref 0–0.2)
BASOPHILS RELATIVE PERCENT: 1.5 % (ref 0–1)
BILIRUB SERPL-MCNC: <0.2 MG/DL (ref 0.2–1.2)
BUN BLDV-MCNC: 24 MG/DL (ref 6–20)
CALCIUM SERPL-MCNC: 8 MG/DL (ref 8.6–10)
CHLORIDE BLD-SCNC: 107 MMOL/L (ref 98–111)
CO2: 22 MMOL/L (ref 22–29)
CREAT SERPL-MCNC: 1.8 MG/DL (ref 0.5–1.2)
EOSINOPHILS ABSOLUTE: 0.6 K/UL (ref 0–0.6)
EOSINOPHILS RELATIVE PERCENT: 5.9 % (ref 0–5)
GFR NON-AFRICAN AMERICAN: 39
GLUCOSE BLD-MCNC: 94 MG/DL (ref 74–109)
HCT VFR BLD CALC: 31.9 % (ref 42–52)
HEMOGLOBIN: 9.6 G/DL (ref 14–18)
LYMPHOCYTES ABSOLUTE: 2.2 K/UL (ref 1.1–4.5)
LYMPHOCYTES RELATIVE PERCENT: 22.4 % (ref 20–40)
MCH RBC QN AUTO: 28.6 PG (ref 27–31)
MCHC RBC AUTO-ENTMCNC: 30.1 G/DL (ref 33–37)
MCV RBC AUTO: 94.9 FL (ref 80–94)
MONOCYTES ABSOLUTE: 0.8 K/UL (ref 0–0.9)
MONOCYTES RELATIVE PERCENT: 8.2 % (ref 0–10)
NEUTROPHILS ABSOLUTE: 6.1 K/UL (ref 1.5–7.5)
NEUTROPHILS RELATIVE PERCENT: 61.8 % (ref 50–65)
PARATHYROID HORMONE INTACT: 85 PG/ML (ref 15–65)
PDW BLD-RTO: 14.9 % (ref 11.5–14.5)
PLATELET # BLD: 305 K/UL (ref 130–400)
PMV BLD AUTO: 10.2 FL (ref 9.4–12.4)
POTASSIUM SERPL-SCNC: 4.5 MMOL/L (ref 3.5–5)
RBC # BLD: 3.36 M/UL (ref 4.7–6.1)
SODIUM BLD-SCNC: 140 MMOL/L (ref 136–145)
TOTAL PROTEIN: 5.5 G/DL (ref 6.6–8.7)
VITAMIN D 25-HYDROXY: 12.7 NG/ML
WBC # BLD: 9.9 K/UL (ref 4.8–10.8)

## 2019-09-06 LAB
ALBUMIN SERPL-MCNC: 2.8 G/DL (ref 3.5–5.2)
ANION GAP SERPL CALCULATED.3IONS-SCNC: 12 MMOL/L (ref 7–19)
BACTERIA: NEGATIVE /HPF
BILIRUBIN URINE: NEGATIVE
BLOOD, URINE: ABNORMAL
BUN BLDV-MCNC: 25 MG/DL (ref 6–20)
CALCIUM SERPL-MCNC: 8.4 MG/DL (ref 8.6–10)
CHLORIDE BLD-SCNC: 111 MMOL/L (ref 98–111)
CLARITY: CLEAR
CO2: 21 MMOL/L (ref 22–29)
COLOR: YELLOW
CREAT SERPL-MCNC: 2 MG/DL (ref 0.5–1.2)
CREATININE URINE: 62.5 MG/DL (ref 4.2–622)
EPITHELIAL CELLS, UA: 3 /HPF (ref 0–5)
GFR NON-AFRICAN AMERICAN: 34
GLUCOSE BLD-MCNC: 74 MG/DL (ref 74–109)
GLUCOSE URINE: 250 MG/DL
HYALINE CASTS: 13 /HPF (ref 0–8)
KETONES, URINE: NEGATIVE MG/DL
LEUKOCYTE ESTERASE, URINE: NEGATIVE
NITRITE, URINE: NEGATIVE
PH UA: 7.5 (ref 5–8)
PHOSPHORUS: 4.7 MG/DL (ref 2.5–4.5)
POTASSIUM SERPL-SCNC: 5.5 MMOL/L (ref 3.5–5)
PROTEIN PROTEIN: 383 MG/DL (ref 15–45)
PROTEIN UA: >=1000 MG/DL
RBC UA: 11 /HPF (ref 0–4)
SODIUM BLD-SCNC: 144 MMOL/L (ref 136–145)
SPECIFIC GRAVITY UA: 1.02 (ref 1–1.03)
URINE REFLEX TO CULTURE: ABNORMAL
UROBILINOGEN, URINE: 0.2 E.U./DL
WBC UA: 3 /HPF (ref 0–5)

## 2019-10-17 LAB
ALBUMIN SERPL-MCNC: 2.1 G/DL (ref 3.5–5.2)
ALP BLD-CCNC: 66 U/L (ref 40–130)
ALT SERPL-CCNC: 7 U/L (ref 5–41)
ANION GAP SERPL CALCULATED.3IONS-SCNC: 10 MMOL/L (ref 7–19)
AST SERPL-CCNC: 13 U/L (ref 5–40)
BILIRUB SERPL-MCNC: 0.3 MG/DL (ref 0.2–1.2)
BUN BLDV-MCNC: 13 MG/DL (ref 6–20)
CALCIUM SERPL-MCNC: 7.6 MG/DL (ref 8.6–10)
CHLORIDE BLD-SCNC: 111 MMOL/L (ref 98–111)
CO2: 23 MMOL/L (ref 22–29)
CREAT SERPL-MCNC: 1.7 MG/DL (ref 0.5–1.2)
GFR NON-AFRICAN AMERICAN: 41
GLUCOSE BLD-MCNC: 101 MG/DL (ref 74–109)
POTASSIUM SERPL-SCNC: 4.2 MMOL/L (ref 3.5–5)
SODIUM BLD-SCNC: 144 MMOL/L (ref 136–145)
TOTAL PROTEIN: 5.6 G/DL (ref 6.6–8.7)

## 2019-12-02 LAB
ANION GAP SERPL CALCULATED.3IONS-SCNC: 20 MMOL/L (ref 7–19)
BUN BLDV-MCNC: 32 MG/DL (ref 8–23)
CALCIUM SERPL-MCNC: 9.4 MG/DL (ref 8.8–10.2)
CHLORIDE BLD-SCNC: 94 MMOL/L (ref 98–111)
CO2: 25 MMOL/L (ref 22–29)
CREAT SERPL-MCNC: 2.7 MG/DL (ref 0.5–1.2)
GFR NON-AFRICAN AMERICAN: 24
GLUCOSE BLD-MCNC: 166 MG/DL (ref 74–109)
POTASSIUM SERPL-SCNC: 3.5 MMOL/L (ref 3.5–5)
SODIUM BLD-SCNC: 139 MMOL/L (ref 136–145)

## 2019-12-16 NOTE — PROGRESS NOTES
Chief Complaint   Patient presents with   • Colon Cancer Screening     Pt presents today for evaluation for screening colonoscopy; Pt sees nephrologist in [place] and may be starting an immunosuppressant-he would like pt to have a screening colonoscopy      Subjective   HPI  Beni Fuchs is a 59 y.o. male who presents as a referral for preventative maintenance. He has no complaints of nausea or vomiting. No change in bowels. No wt loss. No BRBPR. No melena. There is no family hx for colon cancer. No abdominal pain. There was no Cologuard screening this year.  The patient has not had a colonoscopy in the past.  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    Past Medical History:   Diagnosis Date   • A-fib (CMS/HCC)    • Cellulitis of both lower extremities 2018   • Chronic kidney disease May 2019   • Elevated cholesterol    • Hypertension    • Idiopathic membranous glomerular disease 2019    was treated acutely Nov. 20th, 2019 [facility], [place], operated by [facility] Dr.Praveen Kamara   • Neuropathy      Past Surgical History:   Procedure Laterality Date   • BACK SURGERY Right 2011    L1-5 fusion   • TOTAL HIP ARTHROPLASTY Left        Current Outpatient Medications:   •  CARTIA  MG 24 hr capsule, Take 1 capsule by mouth Daily., Disp: , Rfl: 3  •  CloNIDine (CATAPRES) 0.2 MG tablet, Take 0.2 mg by mouth 2 (Two) Times a Day., Disp: , Rfl:   •  hydrALAZINE (APRESOLINE) 50 MG tablet, Take 100 mg by mouth 2 (Two) Times a Day., Disp: , Rfl:   •  levothyroxine (SYNTHROID, LEVOTHROID) 88 MCG tablet, Take 88 mcg by mouth Daily., Disp: , Rfl:   •  metOLazone (ZAROXOLYN) 2.5 MG tablet, Take 2.5 mg by mouth 3 (Three) Times a Week. Mon, Wed, Fri, Disp: , Rfl: 3  •  potassium chloride (K-DUR,KLOR-CON) 20 MEQ CR tablet, Take 2 tablets by mouth Daily., Disp: , Rfl: 1  •  spironolactone (ALDACTONE) 50 MG  "tablet, Take 50 mg by mouth Daily., Disp: , Rfl: 6  •  torsemide (DEMADEX) 20 MG tablet, Take 40 mg by mouth 2 (Two) Times a Day., Disp: , Rfl:   •  polyethylene glycol (GOLYTELY) 236 g solution, Take 4,000 mL by mouth 1 (One) Time for 1 dose. As directed by instructions provided at office, Disp: 4000 mL, Rfl: 0  No Known Allergies  Social History     Socioeconomic History   • Marital status: Single     Spouse name: Not on file   • Number of children: Not on file   • Years of education: Not on file   • Highest education level: Not on file   Tobacco Use   • Smoking status: Former Smoker     Packs/day: 1.00     Years: 40.00     Pack years: 40.00     Types: Cigarettes, Electronic Cigarette   • Smokeless tobacco: Never Used   Substance and Sexual Activity   • Alcohol use: No     Frequency: Never   • Drug use: Never   • Sexual activity: Not Currently     Family History   Problem Relation Age of Onset   • Colon cancer Neg Hx    • Colon polyps Neg Hx    • Esophageal cancer Neg Hx    • Liver cancer Neg Hx    • Liver disease Neg Hx    • Rectal cancer Neg Hx    • Stomach cancer Neg Hx        REVIEW OF SYSTEMS  General: well appearing, no fever chills or sweats, no unexplained wt loss  HEENT: no acute visual or hearing disturbances  Cardiovascular: No chest pain or palpitations  Pulmonary: No shortness of breath, coughing, wheezing or hemoptysis  : No burning, urgency, hematuria, or dysuria  Musculoskeletal: No joint pain or stiffness  Peripheral: no edema  Skin: No lesions or rashes  Neuro: No dizziness, headaches, stroke, syncope  Endocrine: No hot or cold intolerances  Hematological: No blood dyscrasias    Objective   Vitals:    12/17/19 1348   BP: 150/88   BP Location: Left arm   Patient Position: Sitting   Cuff Size: Adult   Pulse: 92   Temp: 97.9 °F (36.6 °C)   TempSrc: Tympanic   SpO2: 100%   Weight: 122 kg (270 lb)   Height: 177.8 cm (70\")     Body mass index is 38.74 kg/m².    PHYSICAL EXAM  General: age appropriate " well nourished well appearing, no acute distress  Head: normocephalic and atraumatic  Global assessment-supple  Neck-No JVD noted, no lymphadenopathy  Pulmonary-clear to auscultation bilaterally, normal respiratory effort  Cardiovascular-normal rate and rhythm, normal heart sounds, S1 and S2 noted  Abdomen-soft, non tender, non distended, normal bowel sounds all 4 quadrants, no hepatosplenomegaly noted  Extremities-No clubbing cyanosis or edema  Neuro-Non focal, converses appropriately, awake, alert, oriented    Imaging Results (Most Recent)     None        Assessment/Plan   Beni was seen today for colon cancer screening.    Diagnoses and all orders for this visit:    Colon cancer screening  -     Case Request; Standing  -     Case Request    Other orders  -     Follow Anesthesia Guidelines / Standing Orders; Future  -     Obtain Informed Consent; Future  -     polyethylene glycol (GOLYTELY) 236 g solution; Take 4,000 mL by mouth 1 (One) Time for 1 dose. As directed by instructions provided at office      COLONOSCOPY WITH ANESTHESIA (N/A)       Body mass index is 38.74 kg/m². Patient's Body mass index is 38.74 kg/m². BMI is above normal parameters. Recommendations include: nutrition counseling.      All risks, benefits, alternatives, and indications of colonoscopy procedure have been discussed with the patient. Risks to include perforation of the colon requiring possible surgery or colostomy, risk of bleeding from biopsies or removal of colon tissue, possibility of missing a colon polyp or cancer, or adverse drug reaction.  Benefits to include the diagnosis and management of disease of the colon and rectum. Alternatives to include barium enema, radiographic evaluation, lab testing or no intervention. Pt verbalizes understanding and agrees.

## 2019-12-17 ENCOUNTER — OFFICE VISIT (OUTPATIENT)
Dept: GASTROENTEROLOGY | Facility: CLINIC | Age: 59
End: 2019-12-17

## 2019-12-17 VITALS
HEIGHT: 70 IN | HEART RATE: 92 BPM | SYSTOLIC BLOOD PRESSURE: 150 MMHG | OXYGEN SATURATION: 100 % | WEIGHT: 270 LBS | BODY MASS INDEX: 38.65 KG/M2 | TEMPERATURE: 97.9 F | DIASTOLIC BLOOD PRESSURE: 88 MMHG

## 2019-12-17 DIAGNOSIS — Z12.11 COLON CANCER SCREENING: Primary | ICD-10-CM

## 2019-12-17 PROCEDURE — S0285 CNSLT BEFORE SCREEN COLONOSC: HCPCS | Performed by: NURSE PRACTITIONER

## 2019-12-17 RX ORDER — METOLAZONE 2.5 MG/1
2.5 TABLET ORAL 3 TIMES WEEKLY
Refills: 3 | COMMUNITY
Start: 2019-11-27

## 2019-12-17 RX ORDER — TORSEMIDE 20 MG/1
40 TABLET ORAL 2 TIMES DAILY
Status: ON HOLD | COMMUNITY
End: 2020-07-31

## 2019-12-17 RX ORDER — SPIRONOLACTONE 50 MG/1
50 TABLET, FILM COATED ORAL DAILY
Refills: 6 | COMMUNITY
Start: 2019-11-27

## 2019-12-17 RX ORDER — DILTIAZEM HYDROCHLORIDE 120 MG/1
1 CAPSULE, EXTENDED RELEASE ORAL DAILY
Refills: 3 | Status: ON HOLD | COMMUNITY
Start: 2019-11-27 | End: 2020-07-31

## 2019-12-17 RX ORDER — POTASSIUM CHLORIDE 20 MEQ/1
2 TABLET, EXTENDED RELEASE ORAL DAILY
Refills: 1 | Status: ON HOLD | COMMUNITY
Start: 2019-12-04 | End: 2020-07-31

## 2019-12-31 ENCOUNTER — ANESTHESIA EVENT (OUTPATIENT)
Dept: GASTROENTEROLOGY | Facility: HOSPITAL | Age: 59
End: 2019-12-31

## 2019-12-31 ENCOUNTER — HOSPITAL ENCOUNTER (OUTPATIENT)
Facility: HOSPITAL | Age: 59
Setting detail: HOSPITAL OUTPATIENT SURGERY
Discharge: HOME | End: 2019-12-31
Attending: INTERNAL MEDICINE | Admitting: INTERNAL MEDICINE

## 2019-12-31 ENCOUNTER — ANESTHESIA (OUTPATIENT)
Dept: GASTROENTEROLOGY | Facility: HOSPITAL | Age: 59
End: 2019-12-31

## 2019-12-31 ENCOUNTER — PREP FOR SURGERY (OUTPATIENT)
Dept: OTHER | Facility: HOSPITAL | Age: 59
End: 2019-12-31

## 2019-12-31 VITALS
TEMPERATURE: 98 F | BODY MASS INDEX: 38.74 KG/M2 | HEART RATE: 88 BPM | HEIGHT: 70 IN | RESPIRATION RATE: 21 BRPM | OXYGEN SATURATION: 97 % | SYSTOLIC BLOOD PRESSURE: 153 MMHG | DIASTOLIC BLOOD PRESSURE: 81 MMHG

## 2019-12-31 DIAGNOSIS — Z12.11 COLON CANCER SCREENING: ICD-10-CM

## 2019-12-31 DIAGNOSIS — Z86.010 HISTORY OF COLON POLYPS: Primary | ICD-10-CM

## 2019-12-31 PROCEDURE — 25010000002 PROPOFOL 10 MG/ML EMULSION: Performed by: NURSE ANESTHETIST, CERTIFIED REGISTERED

## 2019-12-31 PROCEDURE — 45385 COLONOSCOPY W/LESION REMOVAL: CPT | Performed by: INTERNAL MEDICINE

## 2019-12-31 PROCEDURE — 45381 COLONOSCOPY SUBMUCOUS NJX: CPT | Performed by: INTERNAL MEDICINE

## 2019-12-31 PROCEDURE — 88305 TISSUE EXAM BY PATHOLOGIST: CPT | Performed by: INTERNAL MEDICINE

## 2019-12-31 RX ORDER — SODIUM CHLORIDE 0.9 % (FLUSH) 0.9 %
10 SYRINGE (ML) INJECTION AS NEEDED
Status: DISCONTINUED | OUTPATIENT
Start: 2019-12-31 | End: 2019-12-31 | Stop reason: HOSPADM

## 2019-12-31 RX ORDER — PROPOFOL 10 MG/ML
VIAL (ML) INTRAVENOUS AS NEEDED
Status: DISCONTINUED | OUTPATIENT
Start: 2019-12-31 | End: 2019-12-31 | Stop reason: SURG

## 2019-12-31 RX ORDER — SODIUM CHLORIDE 9 MG/ML
500 INJECTION, SOLUTION INTRAVENOUS CONTINUOUS PRN
Status: DISCONTINUED | OUTPATIENT
Start: 2019-12-31 | End: 2019-12-31 | Stop reason: HOSPADM

## 2019-12-31 RX ADMIN — PROPOFOL 300 MG: 10 INJECTION, EMULSION INTRAVENOUS at 09:08

## 2019-12-31 RX ADMIN — SODIUM CHLORIDE 500 ML: 9 INJECTION, SOLUTION INTRAVENOUS at 08:45

## 2019-12-31 RX ADMIN — LIDOCAINE HYDROCHLORIDE 50 MG: 20 INJECTION, SOLUTION INTRAVENOUS at 09:08

## 2019-12-31 NOTE — ANESTHESIA PREPROCEDURE EVALUATION
Anesthesia Evaluation     no history of anesthetic complications:  NPO Solid Status: > 8 hours  NPO Liquid Status: > 2 hours           Airway   Mallampati: II  TM distance: >3 FB  Neck ROM: full  No difficulty expected  Dental    (+) edentulous, upper dentures and lower dentures    Pulmonary      ROS comment: Pt admitted with kidney failure, fluid overlroad in November, resolved  Cardiovascular   Exercise tolerance: good (4-7 METS)    (+) hypertension, dysrhythmias (resolved) Atrial Fib, hyperlipidemia,       Neuro/Psych  (-) seizures, TIA, CVA  GI/Hepatic/Renal/Endo    (+) obesity,   renal disease (idiopathic membranous glomerular disease) CRI,   (-) liver disease, diabetes    ROS Comment: Cr 2.7    Musculoskeletal     Abdominal    Substance History      OB/GYN          Other                        Anesthesia Plan    ASA 3     MAC     intravenous induction     Anesthetic plan, all risks, benefits, and alternatives have been provided, discussed and informed consent has been obtained with: patient.

## 2019-12-31 NOTE — ANESTHESIA POSTPROCEDURE EVALUATION
"Patient: Beni Fuchs    Procedure Summary     Date:  12/31/19 Room / Location:  [facility] ENDOSCOPY 2 / [facility] END[facility]    Anesthesia Start:  0905 Anesthesia Stop:  0940    Procedure:  COLONOSCOPY WITH ANESTHESIA (N/A ) Diagnosis:       Colon cancer screening      (Colon cancer screening [Z12.11])    Surgeon:  Hailey Negreet MD Provider:  Zenia Goldman CRNA    Anesthesia Type:  MAC ASA Status:  3          Anesthesia Type: MAC    Vitals  Vitals Value Taken Time   /96 12/31/2019  9:45 AM   Temp     Pulse 87 12/31/2019  9:51 AM   Resp 18 12/31/2019  9:45 AM   SpO2 98 % 12/31/2019  9:51 AM   Vitals shown include unvalidated device data.        Post Anesthesia Care and Evaluation    Patient location during evaluation: PACU  Patient participation: complete - patient participated  Level of consciousness: awake and alert  Pain management: adequate  Airway patency: patent  Anesthetic complications: No anesthetic complications    Cardiovascular status: acceptable  Respiratory status: acceptable  Hydration status: acceptable    Comments: Blood pressure 153/81, pulse 88, temperature 98 °F (36.7 °C), temperature source Temporal, resp. rate 18, height 177.8 cm (70\"), SpO2 97 %.    Pt discharged from PACU based on jose r score >8      "

## 2020-01-02 PROBLEM — Z86.0100 HISTORY OF COLON POLYPS: Status: ACTIVE | Noted: 2020-01-02

## 2020-01-02 PROBLEM — Z86.010 HISTORY OF COLON POLYPS: Status: ACTIVE | Noted: 2020-01-02

## 2020-01-02 LAB
CYTO UR: NORMAL
LAB AP CASE REPORT: NORMAL
LAB AP CLINICAL INFORMATION: NORMAL
PATH REPORT.FINAL DX SPEC: NORMAL
PATH REPORT.GROSS SPEC: NORMAL

## 2020-02-03 PROBLEM — N04.2 NEPHROTIC SYNDROME WITH DIFFUSE MEMBRANOUS GLOMERULONEPHRITIS: Status: ACTIVE | Noted: 2020-02-03

## 2020-02-03 PROBLEM — N04.20 NEPHROTIC SYNDROME WITH DIFFUSE MEMBRANOUS GLOMERULONEPHRITIS: Status: ACTIVE | Noted: 2020-02-03

## 2020-02-11 ENCOUNTER — HOSPITAL ENCOUNTER (OUTPATIENT)
Dept: LAB | Age: 60
Discharge: HOME | End: 2020-02-11
Payer: COMMERCIAL

## 2020-02-11 LAB
24HR URINE VOLUME (ML): 1900 ML
ANION GAP SERPL CALCULATED.3IONS-SCNC: 19 MMOL/L (ref 7–19)
BUN BLDV-MCNC: 35 MG/DL (ref 8–23)
CALCIUM SERPL-MCNC: 9.3 MG/DL (ref 8.8–10.2)
CHLORIDE BLD-SCNC: 105 MMOL/L (ref 98–111)
CO2: 20 MMOL/L (ref 22–29)
CREAT SERPL-MCNC: 3.1 MG/DL
CREAT SERPL-MCNC: 3.1 MG/DL (ref 0.5–1.2)
CREATININE 24 HOUR URINE: 1 G/24HR (ref 1–2)
CREATININE CLEARANCE: 17 ML/MIN (ref 71–151)
GFR NON-AFRICAN AMERICAN: 21
GLUCOSE BLD-MCNC: 108 MG/DL (ref 74–109)
Lab: 24 HOURS
Lab: 24 HR
POTASSIUM SERPL-SCNC: 5.2 MMOL/L (ref 3.5–5)
PROTEIN 24 HOUR URINE: ABNORMAL MG/24HR (ref 50–100)
SODIUM BLD-SCNC: 144 MMOL/L (ref 136–145)

## 2020-02-11 PROCEDURE — 82575 CREATININE CLEARANCE TEST: CPT

## 2020-02-11 PROCEDURE — 36415 COLL VENOUS BLD VENIPUNCTURE: CPT

## 2020-02-11 PROCEDURE — 84156 ASSAY OF PROTEIN URINE: CPT

## 2020-02-11 PROCEDURE — 80048 BASIC METABOLIC PNL TOTAL CA: CPT

## 2020-02-24 ENCOUNTER — HOSPITAL ENCOUNTER (OUTPATIENT)
Dept: LAB | Age: 60
Discharge: HOME | End: 2020-02-24
Payer: COMMERCIAL

## 2020-02-24 LAB
ANION GAP SERPL CALCULATED.3IONS-SCNC: 18 MMOL/L (ref 7–19)
BUN BLDV-MCNC: 32 MG/DL (ref 8–23)
CALCIUM SERPL-MCNC: 8.5 MG/DL (ref 8.8–10.2)
CHLORIDE BLD-SCNC: 103 MMOL/L (ref 98–111)
CO2: 19 MMOL/L (ref 22–29)
CREAT SERPL-MCNC: 3.3 MG/DL (ref 0.5–1.2)
GFR NON-AFRICAN AMERICAN: 19
GLUCOSE BLD-MCNC: 97 MG/DL (ref 74–109)
POTASSIUM SERPL-SCNC: 4.5 MMOL/L (ref 3.5–5)
SODIUM BLD-SCNC: 140 MMOL/L (ref 136–145)

## 2020-02-24 PROCEDURE — 80048 BASIC METABOLIC PNL TOTAL CA: CPT

## 2020-02-24 PROCEDURE — 36415 COLL VENOUS BLD VENIPUNCTURE: CPT

## 2020-07-23 ENCOUNTER — TRANSCRIBE ORDERS (OUTPATIENT)
Dept: ADMINISTRATIVE | Facility: HOSPITAL | Age: 60
End: 2020-07-23

## 2020-07-23 DIAGNOSIS — Z01.818 PREOP TESTING: Primary | ICD-10-CM

## 2020-07-28 ENCOUNTER — LAB (OUTPATIENT)
Dept: LAB | Facility: HOSPITAL | Age: 60
End: 2020-07-28

## 2020-07-28 PROCEDURE — U0003 INFECTIOUS AGENT DETECTION BY NUCLEIC ACID (DNA OR RNA); SEVERE ACUTE RESPIRATORY SYNDROME CORONAVIRUS 2 (SARS-COV-2) (CORONAVIRUS DISEASE [COVID-19]), AMPLIFIED PROBE TECHNIQUE, MAKING USE OF HIGH THROUGHPUT TECHNOLOGIES AS DESCRIBED BY CMS-2020-01-R: HCPCS | Performed by: INTERNAL MEDICINE

## 2020-07-28 PROCEDURE — C9803 HOPD COVID-19 SPEC COLLECT: HCPCS | Performed by: INTERNAL MEDICINE

## 2020-07-29 LAB
COVID LABCORP PRIORITY: NORMAL
SARS-COV-2 RNA RESP QL NAA+PROBE: NOT DETECTED

## 2020-07-31 ENCOUNTER — ANESTHESIA (OUTPATIENT)
Dept: GASTROENTEROLOGY | Facility: HOSPITAL | Age: 60
End: 2020-07-31

## 2020-07-31 ENCOUNTER — HOSPITAL ENCOUNTER (OUTPATIENT)
Facility: HOSPITAL | Age: 60
Setting detail: HOSPITAL OUTPATIENT SURGERY
Discharge: HOME | End: 2020-07-31
Attending: INTERNAL MEDICINE | Admitting: INTERNAL MEDICINE

## 2020-07-31 ENCOUNTER — ANESTHESIA EVENT (OUTPATIENT)
Dept: GASTROENTEROLOGY | Facility: HOSPITAL | Age: 60
End: 2020-07-31

## 2020-07-31 VITALS
HEIGHT: 70 IN | SYSTOLIC BLOOD PRESSURE: 185 MMHG | HEART RATE: 96 BPM | OXYGEN SATURATION: 98 % | BODY MASS INDEX: 45.1 KG/M2 | RESPIRATION RATE: 19 BRPM | DIASTOLIC BLOOD PRESSURE: 94 MMHG | TEMPERATURE: 97.9 F | WEIGHT: 315 LBS

## 2020-07-31 DIAGNOSIS — Z86.010 HISTORY OF COLON POLYPS: ICD-10-CM

## 2020-07-31 PROBLEM — K63.5 SERRATED POLYP OF COLON: Status: ACTIVE | Noted: 2020-01-02

## 2020-07-31 PROCEDURE — 88305 TISSUE EXAM BY PATHOLOGIST: CPT | Performed by: INTERNAL MEDICINE

## 2020-07-31 PROCEDURE — 25010000002 PROPOFOL 10 MG/ML EMULSION: Performed by: NURSE ANESTHETIST, CERTIFIED REGISTERED

## 2020-07-31 PROCEDURE — 45385 COLONOSCOPY W/LESION REMOVAL: CPT | Performed by: INTERNAL MEDICINE

## 2020-07-31 RX ORDER — SODIUM CHLORIDE 9 MG/ML
100 INJECTION, SOLUTION INTRAVENOUS CONTINUOUS
Status: DISCONTINUED | OUTPATIENT
Start: 2020-07-31 | End: 2020-07-31 | Stop reason: HOSPADM

## 2020-07-31 RX ORDER — SODIUM CHLORIDE 0.9 % (FLUSH) 0.9 %
10 SYRINGE (ML) INJECTION AS NEEDED
Status: DISCONTINUED | OUTPATIENT
Start: 2020-07-31 | End: 2020-07-31 | Stop reason: HOSPADM

## 2020-07-31 RX ORDER — PROPOFOL 10 MG/ML
VIAL (ML) INTRAVENOUS AS NEEDED
Status: DISCONTINUED | OUTPATIENT
Start: 2020-07-31 | End: 2020-07-31 | Stop reason: SURG

## 2020-07-31 RX ORDER — ERGOCALCIFEROL 1.25 MG/1
50000 CAPSULE ORAL
COMMUNITY

## 2020-07-31 RX ORDER — SODIUM CHLORIDE 0.9 % (FLUSH) 0.9 %
10 SYRINGE (ML) INJECTION EVERY 12 HOURS SCHEDULED
Status: DISCONTINUED | OUTPATIENT
Start: 2020-07-31 | End: 2020-07-31 | Stop reason: HOSPADM

## 2020-07-31 RX ADMIN — LIDOCAINE HYDROCHLORIDE 100 MG: 20 INJECTION, SOLUTION INTRAVENOUS at 07:51

## 2020-07-31 RX ADMIN — SODIUM CHLORIDE 100 ML/HR: 9 INJECTION, SOLUTION INTRAVENOUS at 07:35

## 2020-07-31 RX ADMIN — PROPOFOL 380 MG: 10 INJECTION, EMULSION INTRAVENOUS at 07:51

## 2020-07-31 NOTE — ANESTHESIA PREPROCEDURE EVALUATION
Anesthesia Evaluation     no history of anesthetic complications:  NPO Solid Status: > 8 hours  NPO Liquid Status: > 2 hours           Airway   Mallampati: III  TM distance: >3 FB  Neck ROM: full  Dental    (+) edentulous    Pulmonary - normal exam    breath sounds clear to auscultation  (-) asthma, recent URI, sleep apnea, not a smoker    ROS comment: Idiopathic Membranous Glomulerular Disease, states it causes SOB  Cardiovascular - normal exam  Exercise tolerance: good (4-7 METS)    Rhythm: regular  Rate: normal    (+) hypertension, dysrhythmias Atrial Fib, hyperlipidemia,   (-) pacemaker, past MI, angina, cardiac stents, CABG      Neuro/Psych  (-) seizures, TIA, CVA  GI/Hepatic/Renal/Endo    (+) morbid obesity,  renal disease (Idiopathic Membranous Glomulerular Disease) CRI, thyroid problem hypothyroidism  (-) GERD, liver disease, diabetes    Musculoskeletal     Abdominal    Substance History      OB/GYN          Other                        Anesthesia Plan    ASA 3     MAC     intravenous induction     Anesthetic plan, all risks, benefits, and alternatives have been provided, discussed and informed consent has been obtained with: patient.

## 2020-07-31 NOTE — ANESTHESIA POSTPROCEDURE EVALUATION
Patient: Beni Fuchs    Procedure Summary     Date:  07/31/20 Room / Location:  [facility] ENDOSCOPY 4 / [facility] ENDOSCOPY    Anesthesia Start:  0746 Anesthesia Stop:  0816    Procedure:  COLONOSCOPY WITH ANESTHESIA (N/A ) Diagnosis:       History of colon polyps      (History of colon polyps [Z86.010])    Surgeon:  Hailey Negrete MD Provider:  Chapis Lopes CRNA    Anesthesia Type:  MAC ASA Status:  3          Anesthesia Type: MAC    Vitals  Vitals Value Taken Time   /108 7/31/2020  8:17 AM   Temp     Pulse 98 7/31/2020  8:18 AM   Resp 24 7/31/2020  8:17 AM   SpO2 96 % 7/31/2020  8:18 AM   Vitals shown include unvalidated device data.        Post Anesthesia Care and Evaluation    Patient location during evaluation: PHASE II  Patient participation: complete - patient participated  Level of consciousness: awake and alert  Pain management: adequate  Airway patency: patent  Anesthetic complications: No anesthetic complications  PONV Status: none  Cardiovascular status: acceptable  Respiratory status: acceptable  Hydration status: acceptable  No anesthesia care post op

## 2020-08-03 ENCOUNTER — HOSPITAL ENCOUNTER (OUTPATIENT)
Dept: LAB | Age: 60
Discharge: HOME | End: 2020-08-03
Payer: COMMERCIAL

## 2020-08-03 LAB
ANION GAP SERPL CALCULATED.3IONS-SCNC: 16 MMOL/L (ref 7–19)
BUN BLDV-MCNC: 37 MG/DL (ref 8–23)
CALCIUM SERPL-MCNC: 7.6 MG/DL (ref 8.8–10.2)
CHLORIDE BLD-SCNC: 114 MMOL/L (ref 98–111)
CO2: 15 MMOL/L (ref 22–29)
CREAT SERPL-MCNC: 5.4 MG/DL (ref 0.5–1.2)
CYTO UR: NORMAL
GFR AFRICAN AMERICAN: 13
GFR NON-AFRICAN AMERICAN: 11
GLUCOSE BLD-MCNC: 122 MG/DL (ref 74–109)
HCT VFR BLD CALC: 27.6 % (ref 42–52)
HEMOGLOBIN: 7.9 G/DL (ref 14–18)
LAB AP CASE REPORT: NORMAL
MCH RBC QN AUTO: 28.4 PG (ref 27–31)
MCHC RBC AUTO-ENTMCNC: 28.6 G/DL (ref 33–37)
MCV RBC AUTO: 99.3 FL (ref 80–94)
PARATHYROID HORMONE INTACT: 141.3 PG/ML (ref 15–65)
PATH REPORT.FINAL DX SPEC: NORMAL
PATH REPORT.GROSS SPEC: NORMAL
PDW BLD-RTO: 16 % (ref 11.5–14.5)
PLATELET # BLD: 386 K/UL (ref 130–400)
PMV BLD AUTO: 10 FL (ref 9.4–12.4)
POTASSIUM SERPL-SCNC: 4.4 MMOL/L (ref 3.5–5)
RBC # BLD: 2.78 M/UL (ref 4.7–6.1)
SODIUM BLD-SCNC: 145 MMOL/L (ref 136–145)
VITAMIN D 25-HYDROXY: 8.5 NG/ML
WBC # BLD: 8.3 K/UL (ref 4.8–10.8)

## 2020-10-07 ENCOUNTER — HOSPITAL ENCOUNTER (OUTPATIENT)
Dept: VASCULAR LAB | Age: 60
Discharge: HOME | End: 2020-10-07
Payer: COMMERCIAL

## 2020-10-07 PROCEDURE — 93985 DUP-SCAN HEMO COMPL BI STD: CPT

## 2020-10-12 ENCOUNTER — VIRTUAL VISIT (OUTPATIENT)
Dept: VASCULAR SURGERY | Age: 60
End: 2020-10-12
Payer: COMMERCIAL

## 2020-10-12 PROCEDURE — 99204 OFFICE O/P NEW MOD 45 MIN: CPT | Performed by: PHYSICIAN ASSISTANT

## 2020-10-12 NOTE — PROGRESS NOTES
10/12/2020    TELEHEALTH EVALUATION -- Audio/Visual (During E[facility]-81 public health emergency)    HPI:    Carmen Brian (:  1960) has requested an audio/video evaluation for the following concern(s):      Mr. Eliud Dover is a 61 yo male who has a history of ESRD on chronic dialysis and HTN. Today we are evaluating the patient for need for permanent dialysis   Access. He reports that he has an upcoming appointment on 10/29/2020 to meet with Dr. Emmanuel William for evaluation for PD catheter placement. He is right handed. He has a IJ permcath that was placed at another facility. PHYSICAL EXAMINATION:  [ INSTRUCTIONS:  \"[x]\" Indicates a positive item  \"[]\" Indicates a negative item  -- DELETE ALL ITEMS NOT EXAMINED]    Constitutional: [x] Appears well-developed and well-nourished [x] No apparent distress      [] Abnormal-   Mental status  [x] Alert and awake  [x] Oriented to person/place/time [x]Able to follow commands      Eyes:  EOM    [x]  Normal  [] Abnormal-  Sclera  [x]  Normal  [] Abnormal -         Discharge [x]  None visible  [] Abnormal -    HENT:   [x] Normocephalic, atraumatic.   [] Abnormal   [x] Mouth/Throat: Mucous membranes are moist.     External Ears [] Normal  [] Abnormal-     Neck: [] No visualized mass     Pulmonary/Chest: [x] Respiratory effort normal.  [x] No visualized signs of difficulty breathing or respiratory distress        [] Abnormal-      Musculoskeletal:   [] Normal gait with no signs of ataxia         [x] Normal range of motion of neck        [] Abnormal-       Neurological:        [x] No Facial Asymmetry (Cranial nerve 7 motor function) (limited exam to video visit)          [x] No gaze palsy        [] Abnormal-         Skin:        [x] No significant exanthematous lesions or discoloration noted on facial skin         [] Abnormal-            Psychiatric:       [x] Normal Affect [x] No Hallucinations        [] Abnormal-       BUE vein mapping - (reviewed by Dr. Sherren Roses)  Impression          Bilateral upper extremity venous mapping with sizes as noted. Arterial     sizes and pressures as noted.          Signature          ----------------------------------------------------------------     Electronically signed by Kristie MadridInterpreting     physician) on 10/07/2020 07:34 PM     ----------------------------------------------------------------         Blood Pressure:Right arm 144/94 mmHg. Left arm 158/98 mmHg.         Velocities are measured in cm/s ; Diameters are measured in mm         Right Upper Extremities Arterial Mapping    +------------------------+----+-------------+---------------------+--------+    ! Location                !PSV ! AP Diam      !Trans Diam           !Quality ! +------------------------+----+-------------+---------------------+--------+    ! Prox Brachial           !149 !             !                     !        !    +------------------------+----+-------------+---------------------+--------+    ! Dist Brachial           !98. 2!             !5.2                  !        !    +------------------------+----+-------------+---------------------+--------+    ! Prox Radial             !95. 3!             !2.5                  !        !    +------------------------+----+-------------+---------------------+--------+    ! Mid Radial              !75. 6!             !                     !        !    +------------------------+----+-------------+---------------------+--------+    ! Dist Radial             !79. 6!             !3                    !        !    +------------------------+----+-------------+---------------------+--------+    ! Jb Rosenberg             [id number] 6!             !4. 1                  !        !    +------------------------+----+-------------+---------------------+--------+    ! Mid Ulnar               !63. 3!             !                     !        !    +------------------------+----+-------------+---------------------+--------+    ! Dist Ulnar              !106 !             !3.2                  !        !    +------------------------+----+-------------+---------------------+--------+         Left Upper Extremities Arterial Mapping    +------------------------+----+-------------+---------------------+--------+    ! Location                !PSV ! AP Diam      !Trans Diam           !Quality ! +------------------------+----+-------------+---------------------+--------+    ! Prox Brachial           !123 !             !                     !        !    +------------------------+----+-------------+---------------------+--------+    ! Dist Brachial           !83. 3!             !4. 4                  !        !    +------------------------+----+-------------+---------------------+--------+    ! Prox Radial             !72. 3!             !2. 6                  !        !    +------------------------+----+-------------+---------------------+--------+    ! Mid Radial              !81. 7!             !                     !        !    +------------------------+----+-------------+---------------------+--------+    ! Dist Radial             !91. 1!             !2.1                  !        !    +------------------------+----+-------------+---------------------+--------+    ! Prox Ulnar              !76. 2!             !4.5                  !        !    +------------------------+----+-------------+---------------------+--------+    ! Mid Ulnar               !72. 1!             !                     !        !    +------------------------+----+-------------+---------------------+--------+    ! Dist Ulnar             [id number] 1!             !1. 9                  !        !    +------------------------+----+-------------+---------------------+--------+         Velocities are measured in cm/s ; Diameters are measured in mm         Cephalic Mapping    +------------------++--------+----------+-----++--------+----------+-------+    !                  ! ! Right   !          ! Left ! !        !          !       !    +------------------++--------+----------+-----++--------+----------+-------+    ! Location          ! !AP Diam.! Trans Diam!Depth! !AP Diam.! Trans Diam!Depth  !    +------------------++--------+----------+-----++--------+----------+-------+    ! Cephalic at UA    !!        !4. 5       !     !!        !2.2       !       !    +------------------++--------+----------+-----++--------+----------+-------+    ! Cephalic at Mid UA!!        !4. 1       !     !!        !2.2       !       !    +------------------++--------+----------+-----++--------+----------+-------+    ! Cephalic at AF    !!        !3. 5       !     !!        !2. 5       !       !    +------------------++--------+----------+-----++--------+----------+-------+    ! Cephalic at Mid LA!!        !2. 8       !     !!        !2. 5       !       !    +------------------++--------+----------+-----++--------+----------+-------+    ! Cephalic at Wrist !!        !2. 4       !     !!        !1. 3       !       !    +------------------++--------+----------+-----++--------+----------+-------+    ! Median Cubital    !!        !3. 8       !     !!        !1. 1       !       !    +------------------++--------+----------+-----++--------+----------+-------+    ! Prox Radial       !!        !2. 1       !     !!        !1. 6       !       !    +------------------++--------+----------+-----++--------+----------+-------+    ! Dist Radial       !!        !1. 6       !     !!        !1.2       !       !    +------------------++--------+----------+-----++--------+----------+-------+    ! Prox Ulnar        !!        !3         !     !!        !3. 3       !       !    +------------------++--------+----------+-----++--------+----------+-------+    ! Dist Ulnar        !!        !1.2       !     !!        !1. 4       !       !    +------------------++--------+----------+-----++--------+----------+-------+         Basilic Mapping +------------------++-------+-----------+-----++-------+-----------+-------+    !                  ! ! Right  !           ! Left ! !       !           !       !    +------------------++-------+-----------+-----++-------+-----------+-------+    ! Location          ! !AP Diam!Trans Diam !Depth! !AP Diam!Trans Diam !Depth  !    +------------------++-------+-----------+-----++-------+-----------+-------+    ! Basilic at Mid UA !!       !3.2        !     !!       !2.9        !       !    +------------------++-------+-----------+-----++-------+-----------+-------+    ! Basilic at AF     !!       !1. 9        !     !!       !3.2        !       !    +------------------++-------+-----------+-----++-------+-----------+-------+    ! Basilic at Mid LA !!       !1.5        !     !!       !1. 6        !       !    +------------------++-------+-----------+-----++-------+-----------+-------+    ! Basilic at Wrist  !!       !4          !     !!       !1. 3        !       !    +------------------++-------+-----------+-----++-------+-----------+-------+    ! Axillary          !!       !           !     !!       !5.9        !       !    +------------------++-------+-----------+-----++-------+-----------+-------+    ! Prox Brachial     !!       !           !     !!       !4. 8        !       !    +------------------++-------+-----------+-----++-------+-----------+-------+    ! Dist Brachial     !!       !2.9        !     !!       !4. 4        !       !    +------------------++-------+-----------+-----++-------+-----------+-------+             Options have been discussed with the patient including continued medical management vs. proceeding with creation AV access. Patient has opted to proceed with creation AV access. Risks have been discussed with the patient including but not limited to MI, death, CVA, bleed, nerve injury, and infection. ASSESSMENT/PLAN:      1.  ERSD on chronic dialysis      Proceed with Percutaneous AVF vs open AVF kelly Moore is a 60 y.o. male being evaluated by a Virtual Visit (video visit) encounter to address concerns as mentioned above. A caregiver was present when appropriate. Due to this being a TeleHealth encounter (During I[facility]-07 public health emergency), evaluation of the following organ systems was limited: Vitals/Constitutional/EENT/Resp/CV/GI//MS/Neuro/Skin/Heme-Lymph-Imm. Pursuant to the emergency declaration under the [address] [place] [address] and the [employer] and [employer] Act, this Virtual Visit was conducted with patient's (and/or legal guardian's) consent, to reduce the patient's risk of exposure to COVID-19 and provide necessary medical care. The patient (and/or legal guardian) has also been advised to contact this office for worsening conditions or problems, and seek emergency medical treatment and/or call 911 if deemed necessary. Patient identification was verified at the start of the visit: Yes    Services were provided through a video synchronous discussion virtually to substitute for in-person clinic visit. Patient and provider were located at their individual homes. --Peter Castro PA-C on 10/12/2020 at 9:54 AM    An electronic signature was used to authenticate this note.

## 2020-10-13 ENCOUNTER — TELEPHONE (OUTPATIENT)
Dept: VASCULAR SURGERY | Age: 60
End: 2020-10-13

## 2020-10-13 NOTE — TELEPHONE ENCOUNTER
I spoke with Agustin Red and made him aware we have scheduled his surgery with Dr. Saulo Barger for Mon 10/26/20 arriving at [facility] at 6:00am. I made patient aware we have sent in a prescription for Bactroban oint 2% to apply to each side of nares BID for 5 days prior to his surgery date. Agustin Red will need to arrive to [facility] on 10/22/20 at 12:30pm for his pre-op tests and COVID-19 test. Once done we suggest he self quarantines until his surgery, with the exception of Dialysis treatments. Instructions for surgery have been sent to patients MyChart per patient's request. Patient will call with any questions or concerns (Please see letters for instructions given).

## 2020-10-19 ENCOUNTER — OFFICE VISIT (OUTPATIENT)
Dept: SURGERY | Age: 60
End: 2020-10-19
Payer: COMMERCIAL

## 2020-10-19 VITALS
WEIGHT: 266.6 LBS | HEIGHT: 70 IN | BODY MASS INDEX: 38.17 KG/M2 | SYSTOLIC BLOOD PRESSURE: 148 MMHG | TEMPERATURE: 99.2 F | DIASTOLIC BLOOD PRESSURE: 80 MMHG

## 2020-10-19 PROCEDURE — 99202 OFFICE O/P NEW SF 15 MIN: CPT | Performed by: SURGERY

## 2020-10-19 RX ORDER — ERGOCALCIFEROL 1.25 MG/1
50000 CAPSULE ORAL WEEKLY
COMMUNITY

## 2020-10-19 RX ORDER — SPIRONOLACTONE 50 MG/1
50 TABLET, FILM COATED ORAL DAILY
Status: ON HOLD | COMMUNITY
End: 2020-10-26

## 2020-10-19 RX ORDER — DILTIAZEM HYDROCHLORIDE 120 MG/1
120 CAPSULE, EXTENDED RELEASE ORAL 2 TIMES DAILY
Status: ON HOLD | COMMUNITY
End: 2020-10-26

## 2020-10-19 NOTE — LETTER
Preop Orders:     Patient: Andrea Mohamud  : 1960    Hospital: [facility]    Admitting Physician:  Dr. Tila Kohli    Diagnosis: ESRD on dialysis    Procedure: laparoscopic PD catheter placement with possible omentopexy    Time: 1 hour    Anesthesia: General    Admission: Outpatient     Date: to be scheduled, the 28th?     Labs: per anesthesia     Special Instructions:  none    Cardiac Clearance:  none    Special Equipment:  none    Pre-Op Meds:  ancef    Latex Allergy: no    Beta Blocker: no    Medication Instructions: none    Post op visit: 2 weeks post op      Electronically signed by Piter Quintanilla MD   On 10/19/20   @ 10:42 AM

## 2020-10-19 NOTE — PROGRESS NOTES
Subjective:      Patient ID: Elizabeth Montes De Oca is a 60 y.o. male. HPI     Mr. Dalia Young is a 60year old male who presents with a complaint of ESRD on dialysis, requesting evaluation for PD catheter placement. The patient is currently on HD through a permacath. He started dialysis 2020. He goes to the [facility] unit on Tuesday, Thursday, Saturday schedule. He states that his renal failure is due to hypertension and renal antibodies. He has not had any abdominal surgeries. Past Medical History:   Diagnosis Date    [facility]     CKD (chronic kidney disease)     [facility]odialysis patient ([facility]     History of hip surgery     Hypertension     Hypothyroidism     Lymphedema      Past Surgical History:   Procedure Laterality Date    BACK SURGERY      lumbar    DIALYSIS FISTULA CREATION Left 10/26/2020    CREATION AV ACCESS performed by Joselyn Drake DO at [address] [facility] Left     replacement    IR NONTUNNELED VASCULAR CATHETER Right      No current facility-administered medications on file prior to visit. Current Outpatient Medications on File Prior to Visit   Medication Sig Dispense Refill    vitamin D (ERGOCALCIFEROL) 1.25 MG (76429 UT) CAPS capsule Take 50,000 Units by mouth once a week      B Complex Vitamins (VITAMIN-B COMPLEX PO) Take 1 tablet by mouth daily       levothyroxine (SYNTHROID) 88 MCG tablet Take 88 mcg by mouth Daily       HYDROcodone-acetaminophen (NORCO) 5-325 MG per tablet Take 1 tablet by mouth every 6 hours as needed for Pain for up to 3 days. 12 tablet 0     Allergies: Patient has no known allergies.     Family History   Problem Relation Age of Onset    Diabetes Mother     Stroke Father     Diabetes Brother     Stroke Paternal Uncle        Social History     Tobacco Use    Smoking status: Former Smoker     Types: Cigarettes     Last attempt to quit:      Years since quittin.8    Smokeless tobacco: Never Used   Substance Use Topics  Alcohol use: Never     Frequency: Never         Review of Systems   Constitutional: Negative for activity change and appetite change. HENT: Negative for congestion and sore throat. Eyes: Negative for pain and redness. Respiratory: Negative for cough and shortness of breath. Cardiovascular: Negative for chest pain and palpitations. Gastrointestinal: Negative for abdominal distention and abdominal pain. Endocrine: Negative for polydipsia and polyphagia. Genitourinary: Negative for dysuria and hematuria. Musculoskeletal: Negative for arthralgias and back pain. Skin: Negative for rash and wound. Neurological: Negative for dizziness and headaches. Psychiatric/Behavioral: Negative for confusion and dysphoric mood. Objective:   Physical Exam  Vitals signs reviewed. Constitutional:       General: He is not in acute distress. Appearance: Normal appearance. HENT:      Head: Normocephalic and atraumatic. Nose: Nose normal.      Mouth/Throat:      Mouth: Mucous membranes are moist.   Eyes:      Extraocular Movements: Extraocular movements intact. Pupils: Pupils are equal, round, and reactive to light. Neck:      Musculoskeletal: Normal range of motion and neck supple. Cardiovascular:      Rate and Rhythm: Normal rate and regular rhythm. Pulmonary:      Effort: Pulmonary effort is normal. No respiratory distress. Abdominal:      General: There is no distension. Palpations: Abdomen is soft. Tenderness: There is no abdominal tenderness. Musculoskeletal: Normal range of motion. General: No swelling. Skin:     General: Skin is warm and dry. Neurological:      General: No focal deficit present. Mental Status: He is alert and oriented to person, place, and time.    Psychiatric:         Mood and Affect: Mood normal.         Assessment:      60year old male with ESRD on dialysis      Plan:      The patient reports that he is actually going forward

## 2020-10-22 ENCOUNTER — HOSPITAL ENCOUNTER (OUTPATIENT)
Dept: GENERAL RADIOLOGY | Age: 60
Discharge: HOME | End: 2020-10-22
Payer: COMMERCIAL

## 2020-10-22 ENCOUNTER — HOSPITAL ENCOUNTER (OUTPATIENT)
Dept: PREADMISSION TESTING | Age: 60
Discharge: HOME | End: 2020-10-26
Payer: COMMERCIAL

## 2020-10-22 VITALS — HEIGHT: 70 IN | WEIGHT: 260 LBS | BODY MASS INDEX: 37.22 KG/M2

## 2020-10-22 PROCEDURE — 71046 X-RAY EXAM CHEST 2 VIEWS: CPT

## 2020-10-22 PROCEDURE — 87081 CULTURE SCREEN ONLY: CPT

## 2020-10-22 PROCEDURE — 93005 ELECTROCARDIOGRAM TRACING: CPT

## 2020-10-23 ENCOUNTER — TELEPHONE (OUTPATIENT)
Dept: VASCULAR SURGERY | Age: 60
End: 2020-10-23

## 2020-10-23 LAB
EKG P AXIS: 13 DEGREES
EKG P-R INTERVAL: 172 MS
EKG Q-T INTERVAL: 350 MS
EKG QRS DURATION: 90 MS
EKG QTC CALCULATION (BAZETT): 392 MS
EKG T AXIS: 54 DEGREES
MRSA CULTURE ONLY: NORMAL

## 2020-10-23 PROCEDURE — 93010 ELECTROCARDIOGRAM REPORT: CPT | Performed by: INTERNAL MEDICINE

## 2020-10-23 NOTE — TELEPHONE ENCOUNTER
I called to remind patient of his surgery with Dr. Laura Ferro scheduled for Monday 10/26/20 arriving at [address] [place] [address] 43 registration at 6:00am. Patient verbally confirmed.

## 2020-10-24 LAB — SARS-COV-2, NAA: NOT DETECTED

## 2020-10-26 ENCOUNTER — HOSPITAL ENCOUNTER (OUTPATIENT)
Age: 60
Setting detail: OUTPATIENT SURGERY
Discharge: HOME | End: 2020-10-26
Attending: SURGERY | Admitting: SURGERY
Payer: COMMERCIAL

## 2020-10-26 ENCOUNTER — ANESTHESIA EVENT (OUTPATIENT)
Dept: OPERATING ROOM | Age: 60
End: 2020-10-26
Payer: COMMERCIAL

## 2020-10-26 ENCOUNTER — ANESTHESIA (OUTPATIENT)
Dept: OPERATING ROOM | Age: 60
End: 2020-10-26
Payer: COMMERCIAL

## 2020-10-26 VITALS
TEMPERATURE: 98 F | WEIGHT: 255 LBS | OXYGEN SATURATION: 97 % | HEART RATE: 79 BPM | BODY MASS INDEX: 36.51 KG/M2 | RESPIRATION RATE: 16 BRPM | SYSTOLIC BLOOD PRESSURE: 132 MMHG | DIASTOLIC BLOOD PRESSURE: 86 MMHG | HEIGHT: 70 IN

## 2020-10-26 VITALS
RESPIRATION RATE: 20 BRPM | SYSTOLIC BLOOD PRESSURE: 107 MMHG | OXYGEN SATURATION: 99 % | DIASTOLIC BLOOD PRESSURE: 66 MMHG | TEMPERATURE: 98 F

## 2020-10-26 LAB
ANION GAP SERPL CALCULATED.3IONS-SCNC: 19 MMOL/L (ref 7–19)
APTT: 27 SEC (ref 26–36.2)
BUN BLDV-MCNC: 53 MG/DL (ref 8–23)
CALCIUM SERPL-MCNC: 9.6 MG/DL (ref 8.8–10.2)
CHLORIDE BLD-SCNC: 92 MMOL/L (ref 98–111)
CO2: 25 MMOL/L (ref 22–29)
CREAT SERPL-MCNC: 8.1 MG/DL (ref 0.5–1.2)
GFR AFRICAN AMERICAN: 8
GFR NON-AFRICAN AMERICAN: 7
GLUCOSE BLD-MCNC: 121 MG/DL (ref 74–109)
HCT VFR BLD CALC: 32 % (ref 42–52)
HEMOGLOBIN: 10 G/DL (ref 14–18)
INR BLD: 1.11 (ref 0.88–1.18)
MCH RBC QN AUTO: 30 PG (ref 27–31)
MCHC RBC AUTO-ENTMCNC: 31.3 G/DL (ref 33–37)
MCV RBC AUTO: 96.1 FL (ref 80–94)
PDW BLD-RTO: 14 % (ref 11.5–14.5)
PLATELET # BLD: 335 K/UL (ref 130–400)
PMV BLD AUTO: 10.5 FL (ref 9.4–12.4)
POTASSIUM SERPL-SCNC: 3.9 MMOL/L (ref 3.5–4.9)
PROTHROMBIN TIME: 14.3 SEC (ref 12–14.6)
RBC # BLD: 3.33 M/UL (ref 4.7–6.1)
SODIUM BLD-SCNC: 136 MMOL/L (ref 136–145)
WBC # BLD: 14.9 K/UL (ref 4.8–10.8)

## 2020-10-26 PROCEDURE — 85730 THROMBOPLASTIN TIME PARTIAL: CPT

## 2020-10-26 PROCEDURE — 3700000001 HC ADD 15 MINUTES (ANESTHESIA): Performed by: SURGERY

## 2020-10-26 PROCEDURE — 36821 AV FUSION DIRECT ANY SITE: CPT | Performed by: SURGERY

## 2020-10-26 PROCEDURE — 6360000002 HC RX W HCPCS: Performed by: NURSE ANESTHETIST, CERTIFIED REGISTERED

## 2020-10-26 PROCEDURE — 85027 COMPLETE CBC AUTOMATED: CPT

## 2020-10-26 PROCEDURE — 6360000002 HC RX W HCPCS: Performed by: ANESTHESIOLOGY

## 2020-10-26 PROCEDURE — 2500000003 HC RX 250 WO HCPCS: Performed by: NURSE ANESTHETIST, CERTIFIED REGISTERED

## 2020-10-26 PROCEDURE — 6360000002 HC RX W HCPCS: Performed by: SURGERY

## 2020-10-26 PROCEDURE — 2580000003 HC RX 258: Performed by: SURGERY

## 2020-10-26 PROCEDURE — 7100000010 HC PHASE II RECOVERY - FIRST 15 MIN: Performed by: SURGERY

## 2020-10-26 PROCEDURE — 80048 BASIC METABOLIC PNL TOTAL CA: CPT

## 2020-10-26 PROCEDURE — 7100000000 HC PACU RECOVERY - FIRST 15 MIN: Performed by: SURGERY

## 2020-10-26 PROCEDURE — 2709999900 HC NON-CHARGEABLE SUPPLY: Performed by: SURGERY

## 2020-10-26 PROCEDURE — 85610 PROTHROMBIN TIME: CPT

## 2020-10-26 PROCEDURE — 2500000003 HC RX 250 WO HCPCS: Performed by: ANESTHESIOLOGY

## 2020-10-26 PROCEDURE — 3600000003 HC SURGERY LEVEL 3 BASE: Performed by: SURGERY

## 2020-10-26 PROCEDURE — 36415 COLL VENOUS BLD VENIPUNCTURE: CPT

## 2020-10-26 PROCEDURE — 3700000000 HC ANESTHESIA ATTENDED CARE: Performed by: SURGERY

## 2020-10-26 PROCEDURE — 64415 NJX AA&/STRD BRCH PLXS IMG: CPT | Performed by: NURSE ANESTHETIST, CERTIFIED REGISTERED

## 2020-10-26 PROCEDURE — 3600000013 HC SURGERY LEVEL 3 ADDTL 15MIN: Performed by: SURGERY

## 2020-10-26 PROCEDURE — 7100000001 HC PACU RECOVERY - ADDTL 15 MIN: Performed by: SURGERY

## 2020-10-26 RX ORDER — SODIUM CHLORIDE 450 MG/100ML
INJECTION, SOLUTION INTRAVENOUS CONTINUOUS
Status: DISCONTINUED | OUTPATIENT
Start: 2020-10-26 | End: 2020-10-26 | Stop reason: HOSPADM

## 2020-10-26 RX ORDER — LIDOCAINE HYDROCHLORIDE 10 MG/ML
1 INJECTION, SOLUTION EPIDURAL; INFILTRATION; INTRACAUDAL; PERINEURAL
Status: DISCONTINUED | OUTPATIENT
Start: 2020-10-26 | End: 2020-10-26 | Stop reason: HOSPADM

## 2020-10-26 RX ORDER — PROPOFOL 10 MG/ML
INJECTION, EMULSION INTRAVENOUS PRN
Status: DISCONTINUED | OUTPATIENT
Start: 2020-10-26 | End: 2020-10-26 | Stop reason: SDUPTHER

## 2020-10-26 RX ORDER — DEXAMETHASONE SODIUM PHOSPHATE 10 MG/ML
INJECTION, SOLUTION INTRAMUSCULAR; INTRAVENOUS PRN
Status: DISCONTINUED | OUTPATIENT
Start: 2020-10-26 | End: 2020-10-26 | Stop reason: SDUPTHER

## 2020-10-26 RX ORDER — HEPARIN SODIUM 1000 [USP'U]/ML
INJECTION, SOLUTION INTRAVENOUS; SUBCUTANEOUS PRN
Status: DISCONTINUED | OUTPATIENT
Start: 2020-10-26 | End: 2020-10-26 | Stop reason: SDUPTHER

## 2020-10-26 RX ORDER — METOCLOPRAMIDE HYDROCHLORIDE 5 MG/ML
10 INJECTION INTRAMUSCULAR; INTRAVENOUS
Status: DISCONTINUED | OUTPATIENT
Start: 2020-10-26 | End: 2020-10-26 | Stop reason: HOSPADM

## 2020-10-26 RX ORDER — HYDROMORPHONE HYDROCHLORIDE 1 MG/ML
0.25 INJECTION, SOLUTION INTRAMUSCULAR; INTRAVENOUS; SUBCUTANEOUS EVERY 5 MIN PRN
Status: DISCONTINUED | OUTPATIENT
Start: 2020-10-26 | End: 2020-10-26 | Stop reason: HOSPADM

## 2020-10-26 RX ORDER — SODIUM CHLORIDE 0.9 % (FLUSH) 0.9 %
10 SYRINGE (ML) INJECTION PRN
Status: CANCELLED | OUTPATIENT
Start: 2020-10-26

## 2020-10-26 RX ORDER — PROMETHAZINE HYDROCHLORIDE 25 MG/ML
6.25 INJECTION, SOLUTION INTRAMUSCULAR; INTRAVENOUS
Status: DISCONTINUED | OUTPATIENT
Start: 2020-10-26 | End: 2020-10-26 | Stop reason: HOSPADM

## 2020-10-26 RX ORDER — CEFAZOLIN SODIUM 1 G/3ML
INJECTION, POWDER, FOR SOLUTION INTRAMUSCULAR; INTRAVENOUS PRN
Status: DISCONTINUED | OUTPATIENT
Start: 2020-10-26 | End: 2020-10-26 | Stop reason: SDUPTHER

## 2020-10-26 RX ORDER — MORPHINE SULFATE 4 MG/ML
4 INJECTION, SOLUTION INTRAMUSCULAR; INTRAVENOUS
Status: DISCONTINUED | OUTPATIENT
Start: 2020-10-26 | End: 2020-10-26 | Stop reason: HOSPADM

## 2020-10-26 RX ORDER — SODIUM CHLORIDE 0.9 % (FLUSH) 0.9 %
10 SYRINGE (ML) INJECTION EVERY 12 HOURS SCHEDULED
Status: CANCELLED | OUTPATIENT
Start: 2020-10-26

## 2020-10-26 RX ORDER — HYDROMORPHONE HYDROCHLORIDE 1 MG/ML
0.5 INJECTION, SOLUTION INTRAMUSCULAR; INTRAVENOUS; SUBCUTANEOUS EVERY 5 MIN PRN
Status: DISCONTINUED | OUTPATIENT
Start: 2020-10-26 | End: 2020-10-26 | Stop reason: HOSPADM

## 2020-10-26 RX ORDER — LABETALOL HYDROCHLORIDE 5 MG/ML
5 INJECTION, SOLUTION INTRAVENOUS EVERY 10 MIN PRN
Status: DISCONTINUED | OUTPATIENT
Start: 2020-10-26 | End: 2020-10-26 | Stop reason: HOSPADM

## 2020-10-26 RX ORDER — HYDRALAZINE HYDROCHLORIDE 20 MG/ML
5 INJECTION INTRAMUSCULAR; INTRAVENOUS EVERY 10 MIN PRN
Status: DISCONTINUED | OUTPATIENT
Start: 2020-10-26 | End: 2020-10-26 | Stop reason: HOSPADM

## 2020-10-26 RX ORDER — SODIUM CHLORIDE 0.9 % (FLUSH) 0.9 %
10 SYRINGE (ML) INJECTION PRN
Status: DISCONTINUED | OUTPATIENT
Start: 2020-10-26 | End: 2020-10-26 | Stop reason: HOSPADM

## 2020-10-26 RX ORDER — LIDOCAINE HYDROCHLORIDE 20 MG/ML
INJECTION, SOLUTION INFILTRATION; PERINEURAL
Status: COMPLETED | OUTPATIENT
Start: 2020-10-26 | End: 2020-10-26

## 2020-10-26 RX ORDER — MEPERIDINE HYDROCHLORIDE 50 MG/ML
12.5 INJECTION INTRAMUSCULAR; INTRAVENOUS; SUBCUTANEOUS EVERY 5 MIN PRN
Status: DISCONTINUED | OUTPATIENT
Start: 2020-10-26 | End: 2020-10-26 | Stop reason: HOSPADM

## 2020-10-26 RX ORDER — DIPHENHYDRAMINE HYDROCHLORIDE 50 MG/ML
12.5 INJECTION INTRAMUSCULAR; INTRAVENOUS
Status: DISCONTINUED | OUTPATIENT
Start: 2020-10-26 | End: 2020-10-26 | Stop reason: HOSPADM

## 2020-10-26 RX ORDER — SCOLOPAMINE TRANSDERMAL SYSTEM 1 MG/1
1 PATCH, EXTENDED RELEASE TRANSDERMAL ONCE
Status: DISCONTINUED | OUTPATIENT
Start: 2020-10-26 | End: 2020-10-26 | Stop reason: HOSPADM

## 2020-10-26 RX ORDER — ENALAPRILAT 2.5 MG/2ML
1.25 INJECTION INTRAVENOUS
Status: DISCONTINUED | OUTPATIENT
Start: 2020-10-26 | End: 2020-10-26 | Stop reason: HOSPADM

## 2020-10-26 RX ORDER — LIDOCAINE HYDROCHLORIDE 10 MG/ML
INJECTION, SOLUTION EPIDURAL; INFILTRATION; INTRACAUDAL; PERINEURAL PRN
Status: DISCONTINUED | OUTPATIENT
Start: 2020-10-26 | End: 2020-10-26 | Stop reason: SDUPTHER

## 2020-10-26 RX ORDER — ROPIVACAINE HYDROCHLORIDE 5 MG/ML
INJECTION, SOLUTION EPIDURAL; INFILTRATION; PERINEURAL
Status: COMPLETED | OUTPATIENT
Start: 2020-10-26 | End: 2020-10-26

## 2020-10-26 RX ORDER — HYDROCODONE BITARTRATE AND ACETAMINOPHEN 5; 325 MG/1; MG/1
2 TABLET ORAL EVERY 4 HOURS PRN
Status: CANCELLED | OUTPATIENT
Start: 2020-10-26

## 2020-10-26 RX ORDER — ACETAMINOPHEN 325 MG/1
650 TABLET ORAL EVERY 4 HOURS PRN
Status: CANCELLED | OUTPATIENT
Start: 2020-10-26

## 2020-10-26 RX ORDER — SODIUM CHLORIDE, SODIUM LACTATE, POTASSIUM CHLORIDE, CALCIUM CHLORIDE 600; 310; 30; 20 MG/100ML; MG/100ML; MG/100ML; MG/100ML
INJECTION, SOLUTION INTRAVENOUS CONTINUOUS
Status: DISCONTINUED | OUTPATIENT
Start: 2020-10-26 | End: 2020-10-26 | Stop reason: HOSPADM

## 2020-10-26 RX ORDER — SODIUM CHLORIDE 0.9 % (FLUSH) 0.9 %
10 SYRINGE (ML) INJECTION EVERY 12 HOURS SCHEDULED
Status: DISCONTINUED | OUTPATIENT
Start: 2020-10-26 | End: 2020-10-26 | Stop reason: HOSPADM

## 2020-10-26 RX ORDER — MORPHINE SULFATE 4 MG/ML
4 INJECTION, SOLUTION INTRAMUSCULAR; INTRAVENOUS EVERY 5 MIN PRN
Status: DISCONTINUED | OUTPATIENT
Start: 2020-10-26 | End: 2020-10-26 | Stop reason: HOSPADM

## 2020-10-26 RX ORDER — LIDOCAINE HYDROCHLORIDE 10 MG/ML
INJECTION, SOLUTION INFILTRATION; PERINEURAL
Status: COMPLETED | OUTPATIENT
Start: 2020-10-26 | End: 2020-10-26

## 2020-10-26 RX ORDER — ONDANSETRON 2 MG/ML
INJECTION INTRAMUSCULAR; INTRAVENOUS PRN
Status: DISCONTINUED | OUTPATIENT
Start: 2020-10-26 | End: 2020-10-26 | Stop reason: SDUPTHER

## 2020-10-26 RX ORDER — FENTANYL CITRATE 50 UG/ML
50 INJECTION, SOLUTION INTRAMUSCULAR; INTRAVENOUS
Status: DISCONTINUED | OUTPATIENT
Start: 2020-10-26 | End: 2020-10-26 | Stop reason: HOSPADM

## 2020-10-26 RX ORDER — MIDAZOLAM HYDROCHLORIDE 1 MG/ML
2 INJECTION INTRAMUSCULAR; INTRAVENOUS
Status: COMPLETED | OUTPATIENT
Start: 2020-10-26 | End: 2020-10-26

## 2020-10-26 RX ORDER — MIDAZOLAM HYDROCHLORIDE 1 MG/ML
2 INJECTION INTRAMUSCULAR; INTRAVENOUS
Status: DISCONTINUED | OUTPATIENT
Start: 2020-10-26 | End: 2020-10-26 | Stop reason: HOSPADM

## 2020-10-26 RX ORDER — HEPARIN SODIUM 5000 [USP'U]/ML
5000 INJECTION, SOLUTION INTRAVENOUS; SUBCUTANEOUS EVERY 8 HOURS SCHEDULED
Status: CANCELLED | OUTPATIENT
Start: 2020-10-27

## 2020-10-26 RX ORDER — FENTANYL CITRATE 50 UG/ML
INJECTION, SOLUTION INTRAMUSCULAR; INTRAVENOUS PRN
Status: DISCONTINUED | OUTPATIENT
Start: 2020-10-26 | End: 2020-10-26 | Stop reason: SDUPTHER

## 2020-10-26 RX ORDER — HYDROCODONE BITARTRATE AND ACETAMINOPHEN 5; 325 MG/1; MG/1
1 TABLET ORAL EVERY 6 HOURS PRN
Qty: 12 TABLET | Refills: 0 | Status: SHIPPED | OUTPATIENT
Start: 2020-10-26 | End: 2020-10-29

## 2020-10-26 RX ORDER — MORPHINE SULFATE 4 MG/ML
2 INJECTION, SOLUTION INTRAMUSCULAR; INTRAVENOUS EVERY 5 MIN PRN
Status: DISCONTINUED | OUTPATIENT
Start: 2020-10-26 | End: 2020-10-26 | Stop reason: HOSPADM

## 2020-10-26 RX ORDER — HYDROCODONE BITARTRATE AND ACETAMINOPHEN 5; 325 MG/1; MG/1
1 TABLET ORAL EVERY 4 HOURS PRN
Status: CANCELLED | OUTPATIENT
Start: 2020-10-26

## 2020-10-26 RX ADMIN — ONDANSETRON HYDROCHLORIDE 4 MG: 2 INJECTION, SOLUTION INTRAMUSCULAR; INTRAVENOUS at 08:59

## 2020-10-26 RX ADMIN — SODIUM CHLORIDE: 4.5 INJECTION, SOLUTION INTRAVENOUS at 06:41

## 2020-10-26 RX ADMIN — DEXAMETHASONE SODIUM PHOSPHATE 10 MG: 10 INJECTION, SOLUTION INTRAMUSCULAR; INTRAVENOUS at 08:59

## 2020-10-26 RX ADMIN — LIDOCAINE HYDROCHLORIDE 50 MG: 10 INJECTION, SOLUTION EPIDURAL; INFILTRATION; INTRACAUDAL; PERINEURAL at 07:52

## 2020-10-26 RX ADMIN — LIDOCAINE HYDROCHLORIDE 10 ML: 20 INJECTION, SOLUTION INFILTRATION; PERINEURAL at 07:46

## 2020-10-26 RX ADMIN — PROPOFOL 150 MG: 10 INJECTION, EMULSION INTRAVENOUS at 07:52

## 2020-10-26 RX ADMIN — FENTANYL CITRATE 100 MCG: 50 INJECTION INTRAMUSCULAR; INTRAVENOUS at 07:52

## 2020-10-26 RX ADMIN — MIDAZOLAM 2 MG: 1 INJECTION INTRAMUSCULAR; INTRAVENOUS at 07:37

## 2020-10-26 RX ADMIN — LIDOCAINE HYDROCHLORIDE 1 ML: 10 INJECTION, SOLUTION INFILTRATION; PERINEURAL at 07:46

## 2020-10-26 RX ADMIN — CEFAZOLIN SODIUM 2000 MG: 1 INJECTION, POWDER, FOR SOLUTION INTRAMUSCULAR; INTRAVENOUS at 07:59

## 2020-10-26 RX ADMIN — ROPIVACAINE HYDROCHLORIDE 10 ML: 5 INJECTION, SOLUTION EPIDURAL; INFILTRATION; PERINEURAL at 07:46

## 2020-10-26 RX ADMIN — HEPARIN SODIUM 3000 UNITS: 1000 INJECTION, SOLUTION INTRAVENOUS; SUBCUTANEOUS at 08:48

## 2020-10-26 ASSESSMENT — PAIN SCALES - GENERAL: PAINLEVEL_OUTOF10: 0

## 2020-10-26 ASSESSMENT — LIFESTYLE VARIABLES: SMOKING_STATUS: 0

## 2020-10-26 NOTE — OP NOTE
Operative Note      Patient: Franci James  YOB: 1960  MRN: [id number]    Date of Procedure: 10/26/2020    Pre-Op Diagnosis: N18.6, Z99.2    Post-Op Diagnosis: Same       Procedure(s):  CREATION AV ACCESS    Surgeon(s):  Kandi Diamond DO    Assistant:   * No surgical staff found *    Anesthesia: General+regional    Estimated Blood Loss (mL): less than 50     Complications: None    Specimens:   * No specimens in log *    Implants:  * No implants in log *      Drains: * No LDAs found *      Findings:  1. The artery has mild plaque and is around 4 mm in diameter  2. The vein is around 3 mm in diameter  3. At the completion of procedure good thrill over the AVF and palpable radial pulse appreciated. Procedure in detail:    After the patient was consented, a block performed by anesthesia, and IV antibiotics administered, he was brought to the operating room and placed on the operating room table supine position. His arm was then prepped and draped in the usual sterile procedure. The vein and artery of proximal forearm and throughout cephalic vein was scanned with ultrasound and marked. A transverse incision was made in the proximal forearm over marked area with knife and subcutaneous tissues were dissected with bovie electrocautery. Sharp dissection was used to expose the cephalic and antecubital veins and the cephalic vein was marked for orientation purposes. The artery was then dissected proximally and distally and vesseloops placed for future vascular control. The patient was given intravenous heparin. After adequate heparinization time, the branches off of the cephalic vein are ligated and a bulldog clamp is placed proximally for vascular control. Two of the branches are then transected and a nice echevarria for anastomosis was made. The proximal and distal vesseloops were tightened and a longitudinal ateriotomy made with 11 blade and Lin scissors.     An end vein to side artery

## 2020-10-26 NOTE — INTERVAL H&P NOTE
Update History & Physical    The patient's History and Physical of October 26, 2020 was reviewed with the patient and I examined the patient. There was no change. The surgical site was confirmed by the patient and me. Plan: The risks, benefits, expected outcome, and alternative to the recommended procedure have been discussed with the patient. Patient understands and wants to proceed with the procedure.      Electronically signed by DO Christy on 10/26/2020 at 7:41 AM

## 2020-10-26 NOTE — ANESTHESIA PRE PROCEDURE
Department of Anesthesiology  Preprocedure Note       Name:  Carmen Brian   Age:  60 y.o.  :  1960                                          MRN:  [id number]         Date:  10/26/2020      Surgeon: Nader Salinas):  Brianda Coleman DO    Procedure: Procedure(s):  CREATION AV ACCESS    Medications prior to admission:   Prior to Admission medications    Medication Sig Start Date End Date Taking?  Authorizing Provider   vitamin D (ERGOCALCIFEROL) 1.25 MG (45346 UT) CAPS capsule Take 50,000 Units by mouth once a week   Yes Historical Provider, MD   B Complex Vitamins (VITAMIN-B COMPLEX PO) Take 1 tablet by mouth daily    Yes Historical Provider, MD   levothyroxine (SYNTHROID) 88 MCG tablet Take 88 mcg by mouth Daily    Yes Historical Provider, MD       Current medications:    Current Facility-Administered Medications   Medication Dose Route Frequency Provider Last Rate Last Dose    midazolam (VERSED) injection 2 mg  2 mg Intravenous Once PRN Silvano Guillermo MD        0.45 % sodium chloride infusion   Intravenous Continuous Brianda Coleman DO 50 mL/hr at 10/26/20 9661         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       Past Medical History:        Diagnosis Date    [facility]     CKD (chronic kidney disease)     [facility]od[facility]     History of hip surgery     Hypertension     Hypothyroidism     Lymphedema        Past Surgical History:        Procedure Laterality Date    BACK SURGERY      lumbar    HIP SURGERY Left     replacement    IR NONTUNNELED VASCULAR CATHETER Right        Social History:    Social History     Tobacco Use    Smoking status: Former Smoker     Types: Cigarettes     Last attempt to quit:      Years since quittin.8    Smokeless tobacco: Never Used   Substance Use Topics    Alcohol use: Never     Frequency: Never Counseling given: Not Answered      Vital Signs (Current):   Vitals:    10/26/20 0629   BP: (!) 173/80   Pulse: 97   Resp: 14   Temp: 99.5 °F (37.5 °C)   TempSrc: Tympanic   SpO2: 96%   Weight: 255 lb (115.7 kg)   Height: 5' 10\" (1.778 m)                                              BP Readings from Last 3 Encounters:   10/26/20 (!) 173/80   10/19/20 (!) 148/80   08/02/19 (!) 207/102       NPO Status: Time of last liquid consumption: 2000                        Time of last solid consumption: 1800                        Date of last liquid consumption: 10/25/20                        Date of last solid food consumption: 10/25/20    BMI:   Wt Readings from Last 3 Encounters:   10/26/20 255 lb (115.7 kg)   10/22/20 260 lb (117.9 kg)   10/19/20 266 lb 9.6 oz (120.9 kg)     Body mass index is 36.59 kg/m². CBC:   Lab Results   Component Value Date    WBC 8.3 08/03/2020    RBC 2.78 08/03/2020    HGB 7.9 08/03/2020    HCT 27.6 08/03/2020    MCV 99.3 08/03/2020    RDW 16.0 08/03/2020     08/03/2020       CMP:   Lab Results   Component Value Date     08/03/2020    K 4.4 08/03/2020     08/03/2020    CO2 15 08/03/2020    BUN 37 08/03/2020    CREATININE 5.4 08/03/2020    GFRAA 13 08/03/2020    LABGLOM 11 08/03/2020    GLUCOSE 122 08/03/2020    PROT 5.6 10/17/2019    CALCIUM 7.6 08/03/2020    BILITOT 0.3 10/17/2019    ALKPHOS 66 10/17/2019    AST 13 10/17/2019    ALT 7 10/17/2019       POC Tests: No results for input(s): POCGLU, POCNA, POCK, POCCL, POCBUN, POCHEMO, POCHCT in the last 72 hours.     Coags:   Lab Results   Component Value Date    PROTIME 13.3 08/02/2019    INR 1.07 08/02/2019       HCG (If Applicable): No results found for: PREGTESTUR, PREGSERUM, HCG, HCGQUANT     ABGs: No results found for: PHART, PO2ART, SDY7RTN, NTH2OFU, BEART, V6BHGMUA     Type & Screen (If Applicable):  No results found for: LABABO, LABRH    Drug/Infectious Status (If Applicable):  No results

## 2020-10-26 NOTE — PROGRESS NOTES
11:25 - called Chung Mallory who is patient's ride home, to come to outpatient.  She is parking and walking into hospital

## 2020-10-26 NOTE — ANESTHESIA PROCEDURE NOTES
Peripheral Block    Patient location during procedure: holding area  Staffing  Anesthesiologist: Jovita Keenan MD  Performed: anesthesiologist   Preanesthetic Checklist  Completed: patient identified, site marked, surgical consent, pre-op evaluation, timeout performed, IV checked, risks and benefits discussed, monitors and equipment checked, anesthesia consent given, oxygen available and patient being monitored  Peripheral Block  Patient position: sitting  Prep: ChloraPrep  Patient monitoring: cardiac monitor, continuous pulse ox, frequent blood pressure checks and IV access  Block type: Brachial plexus  Laterality: left  Injection technique: single-shot  Procedures: ultrasound guided  Local infiltration: lidocaine  Infiltration strength: 1 %  Dose: 1 mL  Supraclavicular  Provider prep: mask and sterile gloves  Local infiltration: lidocaine  Needle  Needle type: combined needle/nerve stimulator   Needle gauge: 20 G  Needle length: 5 cm  Needle localization: ultrasound guidance  Assessment  Injection assessment: negative aspiration for heme, no paresthesia on injection and local visualized surrounding nerve on ultrasound  Paresthesia pain: none  Slow fractionated injection: yes  Hemodynamics: stable  Additional Notes  20 cc 0.5% Ropivacaine injected    Under ultrasound (\"US\") guidance, a 22 gauge needle was inserted and placed in close proximity to the brachial plexus nerves. Ultrasound was also used to visualize the spread of the anesthetic in close proximity to the nerve being blocked. The nerve appeared anatomically normal, and there were no abnormal pathological findings. A permanent image was recorded.    Medications Administered  Lidocaine injection 1%, 1 mL  ropivacaine (NAROPIN) injection 0.5%, 10 mL  lidocaine injection 2%, 10 mL  Reason for block: post-op pain management

## 2020-10-26 NOTE — H&P
10/12/2020    TELEHEALTH EVALUATION -- Audio/Visual (During - public health emergency)    HPI:    Froilan Kelsey (:  1960) has requested an audio/video evaluation for the following concern(s):      Mr. Mey Mera is a 61 yo male who has a history of ESRD on chronic dialysis and HTN. Today we are evaluating the patient for need for permanent dialysis   Access. He reports that he has an upcoming appointment on 10/29/2020 to meet with Dr. Chyna Flores for evaluation for PD catheter placement. He is right handed. He has a IJ permcath that was placed at another facility. PHYSICAL EXAMINATION:  [ INSTRUCTIONS:  \"[x]\" Indicates a positive item  \"[]\" Indicates a negative item  -- DELETE ALL ITEMS NOT EXAMINED]    Constitutional: [x] Appears well-developed and well-nourished [x] No apparent distress      [] Abnormal-   Mental status  [x] Alert and awake  [x] Oriented to person/place/time [x]Able to follow commands      Eyes:  EOM    [x]  Normal  [] Abnormal-  Sclera  [x]  Normal  [] Abnormal -         Discharge [x]  None visible  [] Abnormal -    HENT:   [x] Normocephalic, atraumatic.   [] Abnormal   [x] Mouth/Throat: Mucous membranes are moist.     External Ears [] Normal  [] Abnormal-     Neck: [] No visualized mass     Pulmonary/Chest: [x] Respiratory effort normal.  [x] No visualized signs of difficulty breathing or respiratory distress        [] Abnormal-      Musculoskeletal:   [] Normal gait with no signs of ataxia         [x] Normal range of motion of neck        [] Abnormal-       Neurological:        [x] No Facial Asymmetry (Cranial nerve 7 motor function) (limited exam to video visit)          [x] No gaze palsy        [] Abnormal-         Skin:        [x] No significant exanthematous lesions or discoloration noted on facial skin         [] Abnormal-            Psychiatric:       [x] Normal Affect [x] No Hallucinations        [] Abnormal-       BUE vein mapping - (reviewed by Dr. Vassie Cowden)  Impression          Bilateral upper extremity venous mapping with sizes as noted. Arterial     sizes and pressures as noted.          Signature          ----------------------------------------------------------------     Electronically signed by Dari MadridInterpreting     physician) on 10/07/2020 07:34 PM     ----------------------------------------------------------------         Blood Pressure:Right arm 144/94 mmHg. Left arm 158/98 mmHg.         Velocities are measured in cm/s ; Diameters are measured in mm         Right Upper Extremities Arterial Mapping    +------------------------+----+-------------+---------------------+--------+    ! Location                !PSV ! AP Diam      !Trans Diam           !Quality ! +------------------------+----+-------------+---------------------+--------+    ! Prox Brachial           !149 !             !                     !        !    +------------------------+----+-------------+---------------------+--------+    ! Dist Brachial           !98. 2!             !5.2                  !        !    +------------------------+----+-------------+---------------------+--------+    ! Prox Radial             !95. 3!             !2.5                  !        !    +------------------------+----+-------------+---------------------+--------+    ! Mid Radial              !75. 6!             !                     !        !    +------------------------+----+-------------+---------------------+--------+    ! Dist Radial             !79. 6!             !3                    !        !    +------------------------+----+-------------+---------------------+--------+    ! Jb Rosenberg             [id number]!             !4. 1                  !        !    +------------------------+----+-------------+---------------------+--------+    ! Mid Ulnar               !63. 3!             !                     !        !    +------------------------+----+-------------+---------------------+--------+    ! Dist Ulnar              !106 !             !3.2                  !        !    +------------------------+----+-------------+---------------------+--------+         Left Upper Extremities Arterial Mapping    +------------------------+----+-------------+---------------------+--------+    ! Location                !PSV ! AP Diam      !Trans Diam           !Quality ! +------------------------+----+-------------+---------------------+--------+    ! Prox Brachial           !123 !             !                     !        !    +------------------------+----+-------------+---------------------+--------+    ! Dist Brachial           !83. 3!             !4. 4                  !        !    +------------------------+----+-------------+---------------------+--------+    ! Prox Radial             !72. 3!             !2. 6                  !        !    +------------------------+----+-------------+---------------------+--------+    ! Mid Radial              !81. 7!             !                     !        !    +------------------------+----+-------------+---------------------+--------+    ! Dist Radial             !91. 1!             !2.1                  !        !    +------------------------+----+-------------+---------------------+--------+    ! Prox Ulnar              !76. 2!             !4.5                  !        !    +------------------------+----+-------------+---------------------+--------+    ! Mid Ulnar               !72. 1!             !                     !        !    +------------------------+----+-------------+---------------------+--------+    ! Dist Ulnar             [id number]!             !1. 9                  !        !    +------------------------+----+-------------+---------------------+--------+         Velocities are measured in cm/s ; Diameters are measured in mm         Cephalic Mapping    +------------------++--------+----------+-----++--------+----------+-------+    !                  ! ! Right   !          ! Left ! !        !          !       !    +------------------++--------+----------+-----++--------+----------+-------+    ! Location          ! !AP Diam.! Trans Diam!Depth! !AP Diam.! Trans Diam!Depth  !    +------------------++--------+----------+-----++--------+----------+-------+    ! Cephalic at UA    !!        !4. 5       !     !!        !2.2       !       !    +------------------++--------+----------+-----++--------+----------+-------+    ! Cephalic at Mid UA!!        !4. 1       !     !!        !2.2       !       !    +------------------++--------+----------+-----++--------+----------+-------+    ! Cephalic at AF    !!        !3. 5       !     !!        !2. 5       !       !    +------------------++--------+----------+-----++--------+----------+-------+    ! Cephalic at Mid LA!!        !2. 8       !     !!        !2. 5       !       !    +------------------++--------+----------+-----++--------+----------+-------+    ! Cephalic at Wrist !!        !2. 4       !     !!        !1. 3       !       !    +------------------++--------+----------+-----++--------+----------+-------+    ! Median Cubital    !!        !3. 8       !     !!        !1. 1       !       !    +------------------++--------+----------+-----++--------+----------+-------+    ! Prox Radial       !!        !2. 1       !     !!        !1. 6       !       !    +------------------++--------+----------+-----++--------+----------+-------+    ! Dist Radial       !!        !1. 6       !     !!        !1.2       !       !    +------------------++--------+----------+-----++--------+----------+-------+    ! Prox Ulnar        !!        !3         !     !!        !3. 3       !       !    +------------------++--------+----------+-----++--------+----------+-------+    ! Dist Ulnar        !!        !1.2       !     !!        !1. 4       !       !    +------------------++--------+----------+-----++--------+----------+-------+         Basilic Mapping kelly Bowser is a 60 y.o. male being evaluated by a Virtual Visit (video visit) encounter to address concerns as mentioned above. A caregiver was present when appropriate. Due to this being a TeleHealth encounter (During [facility] public health emergency), evaluation of the following organ systems was limited: Vitals/Constitutional/EENT/Resp/CV/GI//MS/Neuro/Skin/Heme-Lymph-Imm. Pursuant to the emergency declaration under the [address] [place] [address] and the [employer] and [employer] Act, this Virtual Visit was conducted with patient's (and/or legal guardian's) consent, to reduce the patient's risk of exposure to COVID-19 and provide necessary medical care. The patient (and/or legal guardian) has also been advised to contact this office for worsening conditions or problems, and seek emergency medical treatment and/or call 911 if deemed necessary. Patient identification was verified at the start of the visit: Yes    Services were provided through a video synchronous discussion virtually to substitute for in-person clinic visit. Patient and provider were located at their individual homes. --Noa He DO on 10/26/2020 at 7:40 AM    An electronic signature was used to authenticate this note.

## 2020-10-26 NOTE — ANESTHESIA POSTPROCEDURE EVALUATION
Department of Anesthesiology  Postprocedure Note    Patient: Bravo Neely  MRN: [id number]  YOB: 1960  Date of evaluation: 10/26/2020  Time:  9:32 AM     Procedure Summary     Date:  10/26/20 Room / Location:  [address]    Anesthesia Start:  2274 Anesthesia Stop:  6045    Procedure:  CREATION AV ACCESS (Left ) Diagnosis:  (N18.6, Z99.2)    Surgeon:  Kathy Triplett DO Responsible Provider:  CHERYL Ruth CRNA    Anesthesia Type:  general, regional ASA Status:  4          Anesthesia Type: general, regional    Angelita Phase I: Angelita Score: 10    Angelita Phase II:      Last vitals: Reviewed and per EMR flowsheets.        Anesthesia Post Evaluation    Patient location during evaluation: PACU  Patient participation: complete - patient participated  Level of consciousness: awake and alert  Pain score: 0  Airway patency: patent  Nausea & Vomiting: no nausea and no vomiting  Complications: no  Cardiovascular status: hemodynamically stable  Respiratory status: acceptable  Hydration status: euvolemic

## 2020-10-28 ENCOUNTER — ANESTHESIA (OUTPATIENT)
Dept: OPERATING ROOM | Age: 60
End: 2020-10-28
Payer: COMMERCIAL

## 2020-10-28 ENCOUNTER — HOSPITAL ENCOUNTER (OUTPATIENT)
Age: 60
Setting detail: OUTPATIENT SURGERY
Discharge: HOME | End: 2020-10-28
Attending: SURGERY | Admitting: SURGERY
Payer: COMMERCIAL

## 2020-10-28 ENCOUNTER — PREP FOR PROCEDURE (OUTPATIENT)
Dept: SURGERY | Age: 60
End: 2020-10-28

## 2020-10-28 ENCOUNTER — ANESTHESIA EVENT (OUTPATIENT)
Dept: OPERATING ROOM | Age: 60
End: 2020-10-28
Payer: COMMERCIAL

## 2020-10-28 VITALS
TEMPERATURE: 98 F | SYSTOLIC BLOOD PRESSURE: 151 MMHG | HEART RATE: 73 BPM | HEIGHT: 70 IN | OXYGEN SATURATION: 96 % | BODY MASS INDEX: 36.51 KG/M2 | DIASTOLIC BLOOD PRESSURE: 77 MMHG | RESPIRATION RATE: 18 BRPM | WEIGHT: 255 LBS

## 2020-10-28 VITALS
OXYGEN SATURATION: 97 % | TEMPERATURE: 97 F | DIASTOLIC BLOOD PRESSURE: 63 MMHG | SYSTOLIC BLOOD PRESSURE: 136 MMHG | RESPIRATION RATE: 2 BRPM

## 2020-10-28 LAB
ANION GAP SERPL CALCULATED.3IONS-SCNC: 17 MMOL/L (ref 7–19)
APTT: 29.1 SEC (ref 26–36.2)
BUN BLDV-MCNC: 42 MG/DL (ref 8–23)
CALCIUM SERPL-MCNC: 9.5 MG/DL (ref 8.8–10.2)
CHLORIDE BLD-SCNC: 98 MMOL/L (ref 98–111)
CO2: 27 MMOL/L (ref 22–29)
CREAT SERPL-MCNC: 6.5 MG/DL (ref 0.5–1.2)
GFR AFRICAN AMERICAN: 11
GFR NON-AFRICAN AMERICAN: 9
GLUCOSE BLD-MCNC: 98 MG/DL (ref 74–109)
HCT VFR BLD CALC: 33.9 % (ref 42–52)
HEMOGLOBIN: 10.2 G/DL (ref 14–18)
INR BLD: 1.14 (ref 0.88–1.18)
MCH RBC QN AUTO: 29.1 PG (ref 27–31)
MCHC RBC AUTO-ENTMCNC: 30.1 G/DL (ref 33–37)
MCV RBC AUTO: 96.9 FL (ref 80–94)
PDW BLD-RTO: 14.7 % (ref 11.5–14.5)
PLATELET # BLD: 386 K/UL (ref 130–400)
PMV BLD AUTO: 10.1 FL (ref 9.4–12.4)
POTASSIUM SERPL-SCNC: 4.3 MMOL/L (ref 3.5–4.9)
PROTHROMBIN TIME: 14.5 SEC (ref 12–14.6)
RBC # BLD: 3.5 M/UL (ref 4.7–6.1)
SODIUM BLD-SCNC: 142 MMOL/L (ref 136–145)
WBC # BLD: 12.6 K/UL (ref 4.8–10.8)

## 2020-10-28 PROCEDURE — 85027 COMPLETE CBC AUTOMATED: CPT

## 2020-10-28 PROCEDURE — 7100000000 HC PACU RECOVERY - FIRST 15 MIN: Performed by: SURGERY

## 2020-10-28 PROCEDURE — 2580000003 HC RX 258: Performed by: ANESTHESIOLOGY

## 2020-10-28 PROCEDURE — 2500000003 HC RX 250 WO HCPCS: Performed by: NURSE ANESTHETIST, CERTIFIED REGISTERED

## 2020-10-28 PROCEDURE — 85730 THROMBOPLASTIN TIME PARTIAL: CPT

## 2020-10-28 PROCEDURE — 3700000001 HC ADD 15 MINUTES (ANESTHESIA): Performed by: SURGERY

## 2020-10-28 PROCEDURE — 3700000000 HC ANESTHESIA ATTENDED CARE: Performed by: SURGERY

## 2020-10-28 PROCEDURE — 2500000003 HC RX 250 WO HCPCS: Performed by: SURGERY

## 2020-10-28 PROCEDURE — 7100000010 HC PHASE II RECOVERY - FIRST 15 MIN: Performed by: SURGERY

## 2020-10-28 PROCEDURE — 6360000002 HC RX W HCPCS: Performed by: NURSE ANESTHETIST, CERTIFIED REGISTERED

## 2020-10-28 PROCEDURE — 49324 LAP INSERT TUNNEL IP CATH: CPT | Performed by: SURGERY

## 2020-10-28 PROCEDURE — 2709999900 HC NON-CHARGEABLE SUPPLY: Performed by: SURGERY

## 2020-10-28 PROCEDURE — 85610 PROTHROMBIN TIME: CPT

## 2020-10-28 PROCEDURE — C2628 CATHETER, OCCLUSION: HCPCS | Performed by: SURGERY

## 2020-10-28 PROCEDURE — 3600000003 HC SURGERY LEVEL 3 BASE: Performed by: SURGERY

## 2020-10-28 PROCEDURE — 7100000011 HC PHASE II RECOVERY - ADDTL 15 MIN: Performed by: SURGERY

## 2020-10-28 PROCEDURE — C1750 CATH, HEMODIALYSIS,LONG-TERM: HCPCS | Performed by: SURGERY

## 2020-10-28 PROCEDURE — 7100000001 HC PACU RECOVERY - ADDTL 15 MIN: Performed by: SURGERY

## 2020-10-28 PROCEDURE — 36415 COLL VENOUS BLD VENIPUNCTURE: CPT

## 2020-10-28 PROCEDURE — 80048 BASIC METABOLIC PNL TOTAL CA: CPT

## 2020-10-28 PROCEDURE — 3600000013 HC SURGERY LEVEL 3 ADDTL 15MIN: Performed by: SURGERY

## 2020-10-28 RX ORDER — MORPHINE SULFATE 4 MG/ML
2 INJECTION, SOLUTION INTRAMUSCULAR; INTRAVENOUS EVERY 5 MIN PRN
Status: DISCONTINUED | OUTPATIENT
Start: 2020-10-28 | End: 2020-10-28 | Stop reason: HOSPADM

## 2020-10-28 RX ORDER — MORPHINE SULFATE 4 MG/ML
2 INJECTION, SOLUTION INTRAMUSCULAR; INTRAVENOUS
Status: DISCONTINUED | OUTPATIENT
Start: 2020-10-28 | End: 2020-10-28 | Stop reason: HOSPADM

## 2020-10-28 RX ORDER — GLYCOPYRROLATE 0.2 MG/ML
INJECTION INTRAMUSCULAR; INTRAVENOUS PRN
Status: DISCONTINUED | OUTPATIENT
Start: 2020-10-28 | End: 2020-10-28 | Stop reason: SDUPTHER

## 2020-10-28 RX ORDER — LIDOCAINE HYDROCHLORIDE 10 MG/ML
INJECTION, SOLUTION EPIDURAL; INFILTRATION; INTRACAUDAL; PERINEURAL PRN
Status: DISCONTINUED | OUTPATIENT
Start: 2020-10-28 | End: 2020-10-28 | Stop reason: SDUPTHER

## 2020-10-28 RX ORDER — HYDROCODONE BITARTRATE AND ACETAMINOPHEN 5; 325 MG/1; MG/1
1 TABLET ORAL EVERY 4 HOURS PRN
Qty: 14 TABLET | Refills: 0 | Status: SHIPPED | OUTPATIENT
Start: 2020-10-28 | End: 2020-11-02

## 2020-10-28 RX ORDER — MEPERIDINE HYDROCHLORIDE 50 MG/ML
12.5 INJECTION INTRAMUSCULAR; INTRAVENOUS; SUBCUTANEOUS EVERY 5 MIN PRN
Status: DISCONTINUED | OUTPATIENT
Start: 2020-10-28 | End: 2020-10-28 | Stop reason: HOSPADM

## 2020-10-28 RX ORDER — SODIUM CHLORIDE 0.9 % (FLUSH) 0.9 %
10 SYRINGE (ML) INJECTION EVERY 12 HOURS SCHEDULED
Status: CANCELLED | OUTPATIENT
Start: 2020-10-28

## 2020-10-28 RX ORDER — ENALAPRILAT 2.5 MG/2ML
1.25 INJECTION INTRAVENOUS
Status: DISCONTINUED | OUTPATIENT
Start: 2020-10-28 | End: 2020-10-28 | Stop reason: HOSPADM

## 2020-10-28 RX ORDER — SODIUM CHLORIDE 450 MG/100ML
INJECTION, SOLUTION INTRAVENOUS CONTINUOUS
Status: DISCONTINUED | OUTPATIENT
Start: 2020-10-28 | End: 2020-10-28 | Stop reason: HOSPADM

## 2020-10-28 RX ORDER — HYDROMORPHONE HYDROCHLORIDE 1 MG/ML
0.25 INJECTION, SOLUTION INTRAMUSCULAR; INTRAVENOUS; SUBCUTANEOUS EVERY 5 MIN PRN
Status: DISCONTINUED | OUTPATIENT
Start: 2020-10-28 | End: 2020-10-28 | Stop reason: HOSPADM

## 2020-10-28 RX ORDER — HYDROMORPHONE HYDROCHLORIDE 1 MG/ML
0.5 INJECTION, SOLUTION INTRAMUSCULAR; INTRAVENOUS; SUBCUTANEOUS EVERY 5 MIN PRN
Status: DISCONTINUED | OUTPATIENT
Start: 2020-10-28 | End: 2020-10-28 | Stop reason: HOSPADM

## 2020-10-28 RX ORDER — HYDRALAZINE HYDROCHLORIDE 20 MG/ML
5 INJECTION INTRAMUSCULAR; INTRAVENOUS EVERY 10 MIN PRN
Status: DISCONTINUED | OUTPATIENT
Start: 2020-10-28 | End: 2020-10-28 | Stop reason: HOSPADM

## 2020-10-28 RX ORDER — FENTANYL CITRATE 50 UG/ML
INJECTION, SOLUTION INTRAMUSCULAR; INTRAVENOUS PRN
Status: DISCONTINUED | OUTPATIENT
Start: 2020-10-28 | End: 2020-10-28 | Stop reason: SDUPTHER

## 2020-10-28 RX ORDER — CEFAZOLIN SODIUM 1 G/3ML
INJECTION, POWDER, FOR SOLUTION INTRAMUSCULAR; INTRAVENOUS PRN
Status: DISCONTINUED | OUTPATIENT
Start: 2020-10-28 | End: 2020-10-28 | Stop reason: SDUPTHER

## 2020-10-28 RX ORDER — CISATRACURIUM BESYLATE 2 MG/ML
INJECTION, SOLUTION INTRAVENOUS PRN
Status: DISCONTINUED | OUTPATIENT
Start: 2020-10-28 | End: 2020-10-28 | Stop reason: SDUPTHER

## 2020-10-28 RX ORDER — MORPHINE SULFATE 4 MG/ML
4 INJECTION, SOLUTION INTRAMUSCULAR; INTRAVENOUS EVERY 5 MIN PRN
Status: DISCONTINUED | OUTPATIENT
Start: 2020-10-28 | End: 2020-10-28 | Stop reason: HOSPADM

## 2020-10-28 RX ORDER — ONDANSETRON 2 MG/ML
INJECTION INTRAMUSCULAR; INTRAVENOUS PRN
Status: DISCONTINUED | OUTPATIENT
Start: 2020-10-28 | End: 2020-10-28

## 2020-10-28 RX ORDER — BUPIVACAINE HYDROCHLORIDE AND EPINEPHRINE 2.5; 5 MG/ML; UG/ML
INJECTION, SOLUTION INFILTRATION; PERINEURAL PRN
Status: DISCONTINUED | OUTPATIENT
Start: 2020-10-28 | End: 2020-10-28 | Stop reason: ALTCHOICE

## 2020-10-28 RX ORDER — MORPHINE SULFATE 4 MG/ML
4 INJECTION, SOLUTION INTRAMUSCULAR; INTRAVENOUS
Status: DISCONTINUED | OUTPATIENT
Start: 2020-10-28 | End: 2020-10-28 | Stop reason: HOSPADM

## 2020-10-28 RX ORDER — METOCLOPRAMIDE HYDROCHLORIDE 5 MG/ML
10 INJECTION INTRAMUSCULAR; INTRAVENOUS
Status: DISCONTINUED | OUTPATIENT
Start: 2020-10-28 | End: 2020-10-28 | Stop reason: HOSPADM

## 2020-10-28 RX ORDER — PROMETHAZINE HYDROCHLORIDE 25 MG/1
12.5 TABLET ORAL EVERY 6 HOURS PRN
Status: CANCELLED | OUTPATIENT
Start: 2020-10-28

## 2020-10-28 RX ORDER — HYDROCODONE BITARTRATE AND ACETAMINOPHEN 5; 325 MG/1; MG/1
1 TABLET ORAL EVERY 4 HOURS PRN
Status: DISCONTINUED | OUTPATIENT
Start: 2020-10-28 | End: 2020-10-28 | Stop reason: HOSPADM

## 2020-10-28 RX ORDER — ONDANSETRON 2 MG/ML
4 INJECTION INTRAMUSCULAR; INTRAVENOUS EVERY 6 HOURS PRN
Status: CANCELLED | OUTPATIENT
Start: 2020-10-28

## 2020-10-28 RX ORDER — PROPOFOL 10 MG/ML
INJECTION, EMULSION INTRAVENOUS PRN
Status: DISCONTINUED | OUTPATIENT
Start: 2020-10-28 | End: 2020-10-28 | Stop reason: SDUPTHER

## 2020-10-28 RX ORDER — PROMETHAZINE HYDROCHLORIDE 25 MG/ML
6.25 INJECTION, SOLUTION INTRAMUSCULAR; INTRAVENOUS
Status: DISCONTINUED | OUTPATIENT
Start: 2020-10-28 | End: 2020-10-28 | Stop reason: HOSPADM

## 2020-10-28 RX ORDER — SODIUM CHLORIDE 0.9 % (FLUSH) 0.9 %
10 SYRINGE (ML) INJECTION PRN
Status: CANCELLED | OUTPATIENT
Start: 2020-10-28

## 2020-10-28 RX ORDER — LABETALOL HYDROCHLORIDE 5 MG/ML
5 INJECTION, SOLUTION INTRAVENOUS EVERY 10 MIN PRN
Status: DISCONTINUED | OUTPATIENT
Start: 2020-10-28 | End: 2020-10-28 | Stop reason: HOSPADM

## 2020-10-28 RX ORDER — DIPHENHYDRAMINE HYDROCHLORIDE 50 MG/ML
12.5 INJECTION INTRAMUSCULAR; INTRAVENOUS
Status: DISCONTINUED | OUTPATIENT
Start: 2020-10-28 | End: 2020-10-28 | Stop reason: HOSPADM

## 2020-10-28 RX ORDER — EPHEDRINE SULFATE 50 MG/ML
INJECTION, SOLUTION INTRAVENOUS PRN
Status: DISCONTINUED | OUTPATIENT
Start: 2020-10-28 | End: 2020-10-28 | Stop reason: SDUPTHER

## 2020-10-28 RX ORDER — HYDROCODONE BITARTRATE AND ACETAMINOPHEN 5; 325 MG/1; MG/1
2 TABLET ORAL EVERY 4 HOURS PRN
Status: DISCONTINUED | OUTPATIENT
Start: 2020-10-28 | End: 2020-10-28 | Stop reason: HOSPADM

## 2020-10-28 RX ADMIN — EPHEDRINE SULFATE 10 MG: 50 INJECTION INTRAMUSCULAR; INTRAVENOUS; SUBCUTANEOUS at 11:35

## 2020-10-28 RX ADMIN — CEFAZOLIN SODIUM 2000 MG: 1 INJECTION, POWDER, FOR SOLUTION INTRAMUSCULAR; INTRAVENOUS at 11:06

## 2020-10-28 RX ADMIN — PROPOFOL 200 MG: 10 INJECTION, EMULSION INTRAVENOUS at 10:56

## 2020-10-28 RX ADMIN — NEOSTIGMINE METHYLSULFATE 2 MG: 1 INJECTION, SOLUTION INTRAMUSCULAR; INTRAVENOUS; SUBCUTANEOUS at 11:42

## 2020-10-28 RX ADMIN — GLYCOPYRROLATE 0.4 MG: 0.2 INJECTION, SOLUTION INTRAMUSCULAR; INTRAVENOUS at 11:42

## 2020-10-28 RX ADMIN — SODIUM CHLORIDE: 4.5 INJECTION, SOLUTION INTRAVENOUS at 09:48

## 2020-10-28 RX ADMIN — SODIUM CHLORIDE: 4.5 INJECTION, SOLUTION INTRAVENOUS at 10:51

## 2020-10-28 RX ADMIN — CISATRACURIUM BESYLATE 12 MG: 2 INJECTION INTRAVENOUS at 10:56

## 2020-10-28 RX ADMIN — FENTANYL CITRATE 25 MCG: 50 INJECTION INTRAMUSCULAR; INTRAVENOUS at 11:23

## 2020-10-28 RX ADMIN — LIDOCAINE HYDROCHLORIDE 50 MG: 10 INJECTION, SOLUTION EPIDURAL; INFILTRATION; INTRACAUDAL; PERINEURAL at 10:56

## 2020-10-28 RX ADMIN — FENTANYL CITRATE 100 MCG: 50 INJECTION INTRAMUSCULAR; INTRAVENOUS at 10:56

## 2020-10-28 RX ADMIN — ONDANSETRON HYDROCHLORIDE 4 MG: 2 INJECTION, SOLUTION INTRAMUSCULAR; INTRAVENOUS at 11:27

## 2020-10-28 ASSESSMENT — PAIN SCALES - GENERAL
PAINLEVEL_OUTOF10: 0
PAINLEVEL_OUTOF10: 0

## 2020-10-28 ASSESSMENT — ENCOUNTER SYMPTOMS
BACK PAIN: 0
ABDOMINAL DISTENTION: 0
SHORTNESS OF BREATH: 0
SORE THROAT: 0
EYE REDNESS: 0
EYE PAIN: 0
COUGH: 0
ABDOMINAL PAIN: 0

## 2020-10-28 ASSESSMENT — PAIN - FUNCTIONAL ASSESSMENT: PAIN_FUNCTIONAL_ASSESSMENT: 0-10

## 2020-10-28 ASSESSMENT — LIFESTYLE VARIABLES: SMOKING_STATUS: 0

## 2020-10-28 NOTE — BRIEF OP NOTE
Brief Postoperative Note      Patient: Swapnil Bowser  YOB: 1960  MRN: [id number]    Date of Procedure: 10/28/2020    Pre-Op Diagnosis: ESRD ON DIALYSIS    Post-Op Diagnosis: Same       Procedure(s):  LAPAROSCOPIC PERITONEAL DIIALYSIS CATHETER PLACEMENT, BURIED    Surgeon(s):  Tyrel Haas MD    Assistant:  First Assistant: Efrain Dye    Anesthesia: General    Estimated Blood Loss (mL): Minimal    Complications: None    Specimens:   * No specimens in log *    Implants:  * No implants in log *      Drains:   [REMOVED] Urethral Catheter Double-lumen 16 fr (Removed)       Findings: no acute findings.  Instilled 500 ml, drained 300 easily    Electronically signed by Tyrel Haas MD on 10/28/2020 at 11:45 AM

## 2020-10-28 NOTE — H&P (VIEW-ONLY)
Subjective:      Patient ID: Peri Acuna is a 60 y.o. male.     HPI      Mr. Uri Currie is a 60year old male who presents with a complaint of ESRD on dialysis, requesting evaluation for PD catheter placement. The patient is currently on HD through a permacath. He started dialysis 8/26/2020. He goes to the [facility] unit on Tuesday, Thursday, Saturday schedule. He states that his renal failure is due to hypertension and renal antibodies. He has not had any abdominal surgeries.     Past Medical History        Past Medical History:   Diagnosis Date    Atrial fibrillation ([facility] [address]      CKD (chronic kidney disease)      Hemodialysis patient ([facility] [facility]      History of hip surgery      Hypertension      Hypothyroidism      Lymphedema          Past Surgical History         Past Surgical History:   Procedure Laterality Date    BACK SURGERY         lumbar    DIALYSIS FISTULA CREATION Left 10/26/2020     CREATION AV ACCESS performed by Karan Noriega DO at [address] [facility] Left       replacement    IR NONTUNNELED VASCULAR CATHETER Right          No current facility-administered medications on file prior to visit.              Current Outpatient Medications on File Prior to Visit   Medication Sig Dispense Refill    vitamin D (ERGOCALCIFEROL) 1.25 MG (37061 UT) CAPS capsule Take 50,000 Units by mouth once a week        B Complex Vitamins (VITAMIN-B COMPLEX PO) Take 1 tablet by mouth daily         levothyroxine (SYNTHROID) 88 MCG tablet Take 88 mcg by mouth Daily         HYDROcodone-acetaminophen (NORCO) 5-325 MG per tablet Take 1 tablet by mouth every 6 hours as needed for Pain for up to 3 days.  12 tablet 0      Allergies: Patient has no known allergies.     Family History         Family History   Problem Relation Age of Onset    Diabetes Mother      Stroke Father      Diabetes Brother      Stroke Paternal Uncle             Social History            Tobacco Use    Smoking status: Former Smoker       Mood normal.            Assessment:   60year old male with ESRD on dialysis                Plan:   The patient reports that he is actually going forward with fistula creation as well. He is very much wanting home dialysis. The risks and benefits of laparoscopic PD catheter placement were discussed with the patient, including but not limited to, bleeding, infection, and injury to surrounding structures. I also discussed with the patient that there are people for whom PD just is not as effective as HD or who do not have good exchange.  The patient expressed understanding and would like to go ahead with catheter placement.                   Charu Denise MD

## 2020-10-28 NOTE — DISCHARGE INSTR - DIET

## 2020-10-28 NOTE — LETTER
[facility]  [address]  [address]  Phone: [phone]    Dr. Lima Trujillo      October 28, 2020     Patient: Maycol Moore   YOB: 1960   Date of Visit: 10/19/2020       To Whom It May Concern: It is my medical opinion that Maycol Moore may return to work on 10/29/2020. If you have any questions or concerns, please don't hesitate to call.     Sincerely,        Dr. Lima Trujillo

## 2020-10-28 NOTE — ANESTHESIA PRE PROCEDURE
Department of Anesthesiology  Preprocedure Note       Name:  Maycol Moore   Age:  60 y.o.  :  1960                                          MRN:  [id number]         Date:  10/28/2020      Surgeon: Eliz Land):  Brianda Orozco MD    Procedure: Procedure(s):  LAPAROSCOPIC PERITONEAL DIIALYSIS CATHETER PLACEMENT WITH POSSIBLE OMENTOPEXY    Medications prior to admission:   Prior to Admission medications    Medication Sig Start Date End Date Taking? Authorizing Provider   HYDROcodone-acetaminophen (NORCO) 5-325 MG per tablet Take 1 tablet by mouth every 6 hours as needed for Pain for up to 3 days. 10/26/20 10/29/20  DO Christy   vitamin D (ERGOCALCIFEROL) 1.25 MG (27504 UT) CAPS capsule Take 50,000 Units by mouth once a week    Historical Provider, MD   B Complex Vitamins (VITAMIN-B COMPLEX PO) Take 1 tablet by mouth daily     Historical Provider, MD   levothyroxine (SYNTHROID) 88 MCG tablet Take 88 mcg by mouth Daily     Historical Provider, MD       Current medications:    No current facility-administered medications for this visit. No current outpatient medications on file.      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

## 2020-10-28 NOTE — DISCHARGE INSTR - ACTIVITY
Activity as tolerated except no lifting more than about 10 pounds for the first week and no driving if taking pain medication. Okay to shower over incisions, but do not submerge under water like a tub or pool for one week. Watch for redness, drainage, or fever, and call for any questions or concerns.

## 2020-10-28 NOTE — H&P
Subjective:      Patient ID: Cynthia De Jesus is a 60 y.o. male.     HPI      Mr. Koffi Garcia is a 60year old male who presents with a complaint of ESRD on dialysis, requesting evaluation for PD catheter placement. The patient is currently on HD through a permacath. He started dialysis 8/26/2020. He goes to the [facility] unit on Tuesday, Thursday, Saturday schedule. He states that his renal failure is due to hypertension and renal antibodies. He has not had any abdominal surgeries.     Past Medical History        Past Medical History:   Diagnosis Date    Atrial fibrillation ([facility]      CKD (chronic kidney disease)      Hemodialysis patient ([facility]      History of hip surgery      Hypertension      Hypothyroidism      Lymphedema          Past Surgical History         Past Surgical History:   Procedure Laterality Date    BACK SURGERY         lumbar    DIALYSIS FISTULA CREATION Left 10/26/2020     CREATION AV ACCESS performed by Sandy Jenkins DO at [address] [facility] Left       replacement    IR NONTUNNELED VASCULAR CATHETER Right          No current facility-administered medications on file prior to visit.              Current Outpatient Medications on File Prior to Visit   Medication Sig Dispense Refill    vitamin D (ERGOCALCIFEROL) 1.25 MG (07343 UT) CAPS capsule Take 50,000 Units by mouth once a week        B Complex Vitamins (VITAMIN-B COMPLEX PO) Take 1 tablet by mouth daily         levothyroxine (SYNTHROID) 88 MCG tablet Take 88 mcg by mouth Daily         HYDROcodone-acetaminophen (NORCO) 5-325 MG per tablet Take 1 tablet by mouth every 6 hours as needed for Pain for up to 3 days.  12 tablet 0      Allergies: Patient has no known allergies.     Family History         Family History   Problem Relation Age of Onset    Diabetes Mother      Stroke Father      Diabetes Brother      Stroke Paternal Uncle             Social History            Tobacco Use    Smoking status: Former Smoker       Types: Cigarettes       Last attempt to quit:        Years since quittin.8    Smokeless tobacco: Never Used   Substance Use Topics    Alcohol use: Never       Frequency: Never            Review of Systems   Constitutional: Negative for activity change and appetite change. HENT: Negative for congestion and sore throat. Eyes: Negative for pain and redness. Respiratory: Negative for cough and shortness of breath. Cardiovascular: Negative for chest pain and palpitations. Gastrointestinal: Negative for abdominal distention and abdominal pain. Endocrine: Negative for polydipsia and polyphagia. Genitourinary: Negative for dysuria and hematuria. Musculoskeletal: Negative for arthralgias and back pain. Skin: Negative for rash and wound. Neurological: Negative for dizziness and headaches. Psychiatric/Behavioral: Negative for confusion and dysphoric mood.         Objective:   Physical Exam  Vitals signs reviewed. Constitutional:       General: He is not in acute distress. Appearance: Normal appearance. HENT:      Head: Normocephalic and atraumatic. Nose: Nose normal.      Mouth/Throat:      Mouth: Mucous membranes are moist.   Eyes:      Extraocular Movements: Extraocular movements intact. Pupils: Pupils are equal, round, and reactive to light. Neck:      Musculoskeletal: Normal range of motion and neck supple. Cardiovascular:      Rate and Rhythm: Normal rate and regular rhythm. Pulmonary:      Effort: Pulmonary effort is normal. No respiratory distress. Abdominal:      General: There is no distension. Palpations: Abdomen is soft. Tenderness: There is no abdominal tenderness. Musculoskeletal: Normal range of motion. General: No swelling. Skin:     General: Skin is warm and dry. Neurological:      General: No focal deficit present. Mental Status: He is alert and oriented to person, place, and time.    Psychiatric:         Mood and Affect: Mood normal.            Assessment:   60year old male with ESRD on dialysis                Plan:   The patient reports that he is actually going forward with fistula creation as well. He is very much wanting home dialysis. The risks and benefits of laparoscopic PD catheter placement were discussed with the patient, including but not limited to, bleeding, infection, and injury to surrounding structures. I also discussed with the patient that there are people for whom PD just is not as effective as HD or who do not have good exchange.  The patient expressed understanding and would like to go ahead with catheter placement.                   Maricel Bright MD

## 2020-10-29 NOTE — OP NOTE
Operative Report    PATIENT NAME: [address] [place] [address] RECORD NO. [id number]  SURGEON: Rafa Stein MD   Primary Care Physician: Ella White MD  Date: 10/28/2020   PROCEDURE PERFORMED: Laparoscopic placement of peritoneal dialysis catheter   PREOPERATIVE DIAGNOSIS: Chronic kidney disease  POSTOPERATIVE DIAGNOSIS: same  SURGEON:  Rafa Stein MD   Assistant: George Ibarra  ANESTHESIA:  General endotracheal anesthesia and local  ESTIMATED BLOOD LOSS:  minimal  SPECIMEN:  None  COMPLICATIONS:  None; patient tolerated the procedure well. FINDINGS: no acute findings  DISPOSITION: PACU - hemodynamically stable. CONDITION: stable      Indications: The patient presented with a history of chronic kidney disease, with up coming need for peritoneal dialysis. They present at this time for placement of a peritoneal dialysis catheter. Procedure Details      After the risks and benefits of the procedure were explained, informed consent was obtained, and the patient was taken to the operating room. The patient was placed in supine position and general anesthesia was begun. The abdomen was prepped and draped in normal sterile fashion. Preoperative antibiotics had been given. A time out was performed. Local anesthetic was injected and stab incision was made above the umbilicus. The base of the umbilicus was grasped and elevated and the verus needle was inserted. The abdomen was insufflated and the 5mm trochar was inserted in the left upper quadrant using the optiview technique. The camera was inserted and an additional port placed under direct visualization, a 5mm port on the left side. The abdominal cavity was inspected. The omentum was then inspected and found not to reach down in to the pelvis. It was decided tan omentopexy was not needed. The pigtail catheter was brought on to the field.  Measurement was made placing the pigtail over the pelvis, and the catheter reaching up to just inferior to the umbilicus. Local anesthetic was injected and a 1 cm incision was made about 3 cm off of midline to the patient's left The 18 gauge cook needle was inserted through the opening and gently advanced down to the peritoneum and then directed inferiorly before passing in to the abdominal cavity. The guidewire was inserted through the needle. The sheath was then placed over the guidewire. The guidewire was removed, and the catheter was inserted through the sheath in to the abdominal cavity. The sheath was broken and removed. The catheter was advanced to make sure the cuff was against the peritoneum. A location was then chosen for the catheter to exit on the RLQ. Local anesthetic was injected and a small incision was made. The laparoscopic maryland grasper was then used to tunnel from the RLQ incision site to the exit site of the catheter. At that point, the catheter was hooked to the cysto tubing and 500 ml of sterile saline was instilled. The bag was then placed down on the floor and 300ml was easily returned. At that point, the cysto tubing was removed and the PD catheter was attached to the titanium end and cap. A pocket was created at this RLQ incision site and the catheter was left buried with the plastic cap on the titanium cap. The laparoscopic equipment had been removed during instillation of the saline. The port sites and small incisions were then closed with 4-0 monocryl subcuticular stitches. Sterile dressings with dermabond were applied. The patient tolerated the procedure well, was extubated and taken to the recovery room in stable condition.                      Shellie Garcia MD   Electronically signed 10/29/2020 at 2:33 PM

## 2020-11-03 ENCOUNTER — VIRTUAL VISIT (OUTPATIENT)
Dept: VASCULAR SURGERY | Age: 60
End: 2020-11-03

## 2020-11-03 PROBLEM — N18.6 CKD (CHRONIC KIDNEY DISEASE) STAGE V REQUIRING CHRONIC DIALYSIS (HCC): Status: ACTIVE | Noted: 2020-11-03

## 2020-11-03 PROBLEM — Z99.2 CKD (CHRONIC KIDNEY DISEASE) STAGE V REQUIRING CHRONIC DIALYSIS (HCC): Status: ACTIVE | Noted: 2020-11-03

## 2020-11-03 PROCEDURE — 99024 POSTOP FOLLOW-UP VISIT: CPT | Performed by: PHYSICIAN ASSISTANT

## 2020-11-03 NOTE — PROGRESS NOTES
11/3/2020    TELEHEALTH EVALUATION -- Audio/Visual (During OBAQY-54 public health emergency)    HPI:    Vannessa Arteaga (:  1960) has requested an audio/video evaluation for the following concern(s):    Mr. Caitlin Molina is a 61 yo male who has a history of ESRD on chronic dialysis. He underwent creation of an UE AV fistula on 10/26/2020 by Dr. Christiano Navarro. He reports a little numbness and and tingling just above the wrist area. NO arm pain, swelling, coldness or hand or immobility of hand. No reports of fever. PHYSICAL EXAMINATION:  [ INSTRUCTIONS:  \"[x]\" Indicates a positive item  \"[]\" Indicates a negative item  -- DELETE ALL ITEMS NOT EXAMINED]    Constitutional: [x] Appears well-developed and well-nourished [x] No apparent distress      [] Abnormal-   Mental status  [x] Alert and awake  [x] Oriented to person/place/time []Able to follow commands      Eyes:  EOM    [x]  Normal  [] Abnormal-  Sclera  [x]  Normal  [] Abnormal -         Discharge [x]  None visible  [] Abnormal -    HENT:   [x] Normocephalic, atraumatic. [] Abnormal   [x] Mouth/Throat: Mucous membranes are moist.     External Ears [] Normal  [] Abnormal-     Neck: [x] No visualized mass     Pulmonary/Chest: [x] Respiratory effort normal.  [x] No visualized signs of difficulty breathing or respiratory distress        [] Abnormal-      Musculoskeletal:   [] Normal gait with no signs of ataxia         [x] Normal range of motion of neck        [] Abnormal-       Neurological:        [x] No Facial Asymmetry (Cranial nerve 7 motor function) (limited exam to video visit)          [x] No gaze palsy        [] Abnormal-         Skin:        [] No significant exanthematous lesions or discoloration noted on facial skin         [] Abnormal-            Psychiatric:       [x] Normal Affect [x] No Hallucinations        [] Abnormal-     Other pertinent observable physical exam findings- Incision left UE is C/D/I.  He reports that when he lies his hand over the fistula he can fill the thrill in the AV fistula distally. ASSESSMENT/PLAN:      1. ESRD on chronic dialysis. I will discuss the case with Dr. Maximo Cotter to see if the patient needs to be set up for a Fistulogram with BAM.       Asif Grier is a 60 y.o. male being evaluated by a Virtual Visit (video visit) encounter to address concerns as mentioned above. A caregiver was present when appropriate. Due to this being a TeleHealth encounter ([facility]), evaluation of the following organ systems was limited: Vitals/Constitutional/EENT/Resp/CV/GI//MS/Neuro/Skin/Heme-Lymph-Imm. Pursuant to the emergency declaration under the [address] [place] [address] authority and the [employer] Act, this Virtual Visit was conducted with patient's (and/or legal guardian's) consent, to reduce the patient's risk of exposure to COVID-19 and provide necessary medical care. The patient (and/or legal guardian) has also been advised to contact this office for worsening conditions or problems, and seek emergency medical treatment and/or call 911 if deemed necessary. Patient identification was verified at the start of the visit: Yes      Services were provided through a video synchronous discussion virtually to substitute for in-person clinic visit. Patient and provider were located at their individual homes. --Adrián Castro PA-C on 11/3/2020 at 11:43 AM    An electronic signature was used to authenticate this note.

## 2020-11-09 ENCOUNTER — OFFICE VISIT (OUTPATIENT)
Dept: SURGERY | Age: 60
End: 2020-11-09

## 2020-11-09 VITALS
WEIGHT: 267.4 LBS | HEIGHT: 70 IN | SYSTOLIC BLOOD PRESSURE: 138 MMHG | DIASTOLIC BLOOD PRESSURE: 74 MMHG | TEMPERATURE: 97.8 F | BODY MASS INDEX: 38.28 KG/M2

## 2020-11-09 PROCEDURE — 99024 POSTOP FOLLOW-UP VISIT: CPT | Performed by: SURGERY

## 2020-11-22 NOTE — PROGRESS NOTES
Subjective:  Mr. Drew Contreras is here to follow up after laparoscopic PD catheter placement. The patient reports that he has been doing well. He denies any issues after surgery, no problems with pain or incision drainage. Objective:  /74 (Site: Right Upper Arm, Position: Sitting, Cuff Size: Large Adult)   Temp 97.8 °F (36.6 °C) (Temporal)   Ht 5' 10\" (1.778 m)   Wt 267 lb 6.4 oz (121.3 kg)   BMI 38.37 kg/m²   General: NAD, AAO  Chest: regular, non-labored  Abdomen: Soft, ND, NT, incisions C/d/I    Assessment and plan:  63year old male s/p laparoscopic PD catheter placement  1) Activity as tolerated  2) will set him up for catheter externalization once the PD department is ready to start teaching.

## 2020-12-04 ENCOUNTER — OFFICE VISIT (OUTPATIENT)
Age: 60
End: 2020-12-04

## 2020-12-04 VITALS — OXYGEN SATURATION: 97 % | HEART RATE: 93 BPM | TEMPERATURE: 98.2 F

## 2020-12-04 PROCEDURE — 99999 PR OFFICE/OUTPT VISIT,PROCEDURE ONLY: CPT | Performed by: NURSE PRACTITIONER

## 2020-12-06 LAB — SARS-COV-2, NAA: NOT DETECTED

## 2020-12-09 ENCOUNTER — HOSPITAL ENCOUNTER (OUTPATIENT)
Age: 60
Setting detail: OUTPATIENT SURGERY
Discharge: HOME | End: 2020-12-09
Attending: SURGERY | Admitting: SURGERY
Payer: COMMERCIAL

## 2020-12-09 ENCOUNTER — ANESTHESIA EVENT (OUTPATIENT)
Dept: OPERATING ROOM | Age: 60
End: 2020-12-09
Payer: COMMERCIAL

## 2020-12-09 ENCOUNTER — ANESTHESIA (OUTPATIENT)
Dept: OPERATING ROOM | Age: 60
End: 2020-12-09
Payer: COMMERCIAL

## 2020-12-09 VITALS
WEIGHT: 250 LBS | RESPIRATION RATE: 18 BRPM | SYSTOLIC BLOOD PRESSURE: 136 MMHG | HEIGHT: 70 IN | DIASTOLIC BLOOD PRESSURE: 65 MMHG | TEMPERATURE: 98 F | BODY MASS INDEX: 35.79 KG/M2 | OXYGEN SATURATION: 97 % | HEART RATE: 80 BPM

## 2020-12-09 VITALS — DIASTOLIC BLOOD PRESSURE: 50 MMHG | TEMPERATURE: 98 F | OXYGEN SATURATION: 98 % | SYSTOLIC BLOOD PRESSURE: 106 MMHG

## 2020-12-09 PROCEDURE — 7100000000 HC PACU RECOVERY - FIRST 15 MIN: Performed by: SURGERY

## 2020-12-09 PROCEDURE — 2709999900 HC NON-CHARGEABLE SUPPLY: Performed by: SURGERY

## 2020-12-09 PROCEDURE — C1750 CATH, HEMODIALYSIS,LONG-TERM: HCPCS | Performed by: SURGERY

## 2020-12-09 PROCEDURE — 49436 EMBEDDED IP CATH EXIT-SITE: CPT | Performed by: SURGERY

## 2020-12-09 PROCEDURE — 2500000003 HC RX 250 WO HCPCS: Performed by: SURGERY

## 2020-12-09 PROCEDURE — 6370000000 HC RX 637 (ALT 250 FOR IP): Performed by: ANESTHESIOLOGY

## 2020-12-09 PROCEDURE — 3700000001 HC ADD 15 MINUTES (ANESTHESIA): Performed by: SURGERY

## 2020-12-09 PROCEDURE — 6360000002 HC RX W HCPCS: Performed by: NURSE ANESTHETIST, CERTIFIED REGISTERED

## 2020-12-09 PROCEDURE — 7100000011 HC PHASE II RECOVERY - ADDTL 15 MIN: Performed by: SURGERY

## 2020-12-09 PROCEDURE — 2580000003 HC RX 258: Performed by: NURSE ANESTHETIST, CERTIFIED REGISTERED

## 2020-12-09 PROCEDURE — 3700000000 HC ANESTHESIA ATTENDED CARE: Performed by: SURGERY

## 2020-12-09 PROCEDURE — 7100000010 HC PHASE II RECOVERY - FIRST 15 MIN: Performed by: SURGERY

## 2020-12-09 PROCEDURE — 7100000001 HC PACU RECOVERY - ADDTL 15 MIN: Performed by: SURGERY

## 2020-12-09 PROCEDURE — 3600000003 HC SURGERY LEVEL 3 BASE: Performed by: SURGERY

## 2020-12-09 PROCEDURE — 3600000013 HC SURGERY LEVEL 3 ADDTL 15MIN: Performed by: SURGERY

## 2020-12-09 RX ORDER — FENTANYL CITRATE 50 UG/ML
INJECTION, SOLUTION INTRAMUSCULAR; INTRAVENOUS PRN
Status: DISCONTINUED | OUTPATIENT
Start: 2020-12-09 | End: 2020-12-09 | Stop reason: SDUPTHER

## 2020-12-09 RX ORDER — PROPOFOL 10 MG/ML
INJECTION, EMULSION INTRAVENOUS PRN
Status: DISCONTINUED | OUTPATIENT
Start: 2020-12-09 | End: 2020-12-09 | Stop reason: SDUPTHER

## 2020-12-09 RX ORDER — HYDROCODONE BITARTRATE AND ACETAMINOPHEN 5; 325 MG/1; MG/1
1 TABLET ORAL EVERY 4 HOURS PRN
Status: DISCONTINUED | OUTPATIENT
Start: 2020-12-09 | End: 2020-12-09 | Stop reason: HOSPADM

## 2020-12-09 RX ORDER — SODIUM CHLORIDE 0.9 % (FLUSH) 0.9 %
10 SYRINGE (ML) INJECTION PRN
Status: DISCONTINUED | OUTPATIENT
Start: 2020-12-09 | End: 2020-12-09 | Stop reason: HOSPADM

## 2020-12-09 RX ORDER — ONDANSETRON 2 MG/ML
4 INJECTION INTRAMUSCULAR; INTRAVENOUS
Status: DISCONTINUED | OUTPATIENT
Start: 2020-12-09 | End: 2020-12-09 | Stop reason: HOSPADM

## 2020-12-09 RX ORDER — MIDAZOLAM HYDROCHLORIDE 1 MG/ML
INJECTION INTRAMUSCULAR; INTRAVENOUS PRN
Status: DISCONTINUED | OUTPATIENT
Start: 2020-12-09 | End: 2020-12-09 | Stop reason: SDUPTHER

## 2020-12-09 RX ORDER — MORPHINE SULFATE 4 MG/ML
2 INJECTION, SOLUTION INTRAMUSCULAR; INTRAVENOUS
Status: DISCONTINUED | OUTPATIENT
Start: 2020-12-09 | End: 2020-12-09 | Stop reason: HOSPADM

## 2020-12-09 RX ORDER — FAMOTIDINE 20 MG/1
20 TABLET, FILM COATED ORAL ONCE
Status: COMPLETED | OUTPATIENT
Start: 2020-12-09 | End: 2020-12-09

## 2020-12-09 RX ORDER — HYDROMORPHONE HYDROCHLORIDE 1 MG/ML
0.25 INJECTION, SOLUTION INTRAMUSCULAR; INTRAVENOUS; SUBCUTANEOUS EVERY 5 MIN PRN
Status: DISCONTINUED | OUTPATIENT
Start: 2020-12-09 | End: 2020-12-09 | Stop reason: HOSPADM

## 2020-12-09 RX ORDER — ONDANSETRON 2 MG/ML
4 INJECTION INTRAMUSCULAR; INTRAVENOUS EVERY 6 HOURS PRN
Status: CANCELLED | OUTPATIENT
Start: 2020-12-09

## 2020-12-09 RX ORDER — SODIUM CHLORIDE 0.9 % (FLUSH) 0.9 %
10 SYRINGE (ML) INJECTION PRN
Status: CANCELLED | OUTPATIENT
Start: 2020-12-09

## 2020-12-09 RX ORDER — PROPOFOL 10 MG/ML
INJECTION, EMULSION INTRAVENOUS CONTINUOUS PRN
Status: DISCONTINUED | OUTPATIENT
Start: 2020-12-09 | End: 2020-12-09 | Stop reason: SDUPTHER

## 2020-12-09 RX ORDER — FENTANYL CITRATE 50 UG/ML
50 INJECTION, SOLUTION INTRAMUSCULAR; INTRAVENOUS EVERY 5 MIN PRN
Status: DISCONTINUED | OUTPATIENT
Start: 2020-12-09 | End: 2020-12-09 | Stop reason: HOSPADM

## 2020-12-09 RX ORDER — HYDROCODONE BITARTRATE AND ACETAMINOPHEN 5; 325 MG/1; MG/1
2 TABLET ORAL EVERY 4 HOURS PRN
Status: DISCONTINUED | OUTPATIENT
Start: 2020-12-09 | End: 2020-12-09 | Stop reason: HOSPADM

## 2020-12-09 RX ORDER — PROMETHAZINE HYDROCHLORIDE 25 MG/1
12.5 TABLET ORAL EVERY 6 HOURS PRN
Status: CANCELLED | OUTPATIENT
Start: 2020-12-09

## 2020-12-09 RX ORDER — CEFAZOLIN SODIUM 1 G/3ML
INJECTION, POWDER, FOR SOLUTION INTRAMUSCULAR; INTRAVENOUS PRN
Status: DISCONTINUED | OUTPATIENT
Start: 2020-12-09 | End: 2020-12-09 | Stop reason: SDUPTHER

## 2020-12-09 RX ORDER — HYDROCODONE BITARTRATE AND ACETAMINOPHEN 5; 325 MG/1; MG/1
1 TABLET ORAL EVERY 8 HOURS PRN
Qty: 8 TABLET | Refills: 0 | Status: SHIPPED | OUTPATIENT
Start: 2020-12-09 | End: 2020-12-12

## 2020-12-09 RX ORDER — BUPIVACAINE HYDROCHLORIDE AND EPINEPHRINE 2.5; 5 MG/ML; UG/ML
INJECTION, SOLUTION INFILTRATION; PERINEURAL PRN
Status: DISCONTINUED | OUTPATIENT
Start: 2020-12-09 | End: 2020-12-09 | Stop reason: ALTCHOICE

## 2020-12-09 RX ORDER — SODIUM CHLORIDE 0.9 % (FLUSH) 0.9 %
10 SYRINGE (ML) INJECTION EVERY 12 HOURS SCHEDULED
Status: DISCONTINUED | OUTPATIENT
Start: 2020-12-09 | End: 2020-12-09 | Stop reason: HOSPADM

## 2020-12-09 RX ORDER — SODIUM CHLORIDE 0.9 % (FLUSH) 0.9 %
10 SYRINGE (ML) INJECTION EVERY 12 HOURS SCHEDULED
Status: CANCELLED | OUTPATIENT
Start: 2020-12-09

## 2020-12-09 RX ORDER — HYDROMORPHONE HYDROCHLORIDE 1 MG/ML
0.5 INJECTION, SOLUTION INTRAMUSCULAR; INTRAVENOUS; SUBCUTANEOUS EVERY 5 MIN PRN
Status: DISCONTINUED | OUTPATIENT
Start: 2020-12-09 | End: 2020-12-09 | Stop reason: HOSPADM

## 2020-12-09 RX ORDER — MORPHINE SULFATE 4 MG/ML
4 INJECTION, SOLUTION INTRAMUSCULAR; INTRAVENOUS
Status: DISCONTINUED | OUTPATIENT
Start: 2020-12-09 | End: 2020-12-09 | Stop reason: HOSPADM

## 2020-12-09 RX ORDER — SODIUM CHLORIDE 450 MG/100ML
INJECTION, SOLUTION INTRAVENOUS CONTINUOUS PRN
Status: DISCONTINUED | OUTPATIENT
Start: 2020-12-09 | End: 2020-12-09 | Stop reason: SDUPTHER

## 2020-12-09 RX ORDER — SODIUM CHLORIDE, SODIUM LACTATE, POTASSIUM CHLORIDE, CALCIUM CHLORIDE 600; 310; 30; 20 MG/100ML; MG/100ML; MG/100ML; MG/100ML
INJECTION, SOLUTION INTRAVENOUS CONTINUOUS
Status: DISCONTINUED | OUTPATIENT
Start: 2020-12-09 | End: 2020-12-09 | Stop reason: HOSPADM

## 2020-12-09 RX ADMIN — SODIUM CHLORIDE: 4.5 INJECTION, SOLUTION INTRAVENOUS at 15:33

## 2020-12-09 RX ADMIN — PROPOFOL 30 MG: 10 INJECTION, EMULSION INTRAVENOUS at 15:37

## 2020-12-09 RX ADMIN — FENTANYL CITRATE 100 MCG: 50 INJECTION INTRAMUSCULAR; INTRAVENOUS at 15:37

## 2020-12-09 RX ADMIN — CEFAZOLIN SODIUM 2000 MG: 1 INJECTION, POWDER, FOR SOLUTION INTRAMUSCULAR; INTRAVENOUS at 15:39

## 2020-12-09 RX ADMIN — PROPOFOL 100 MCG/KG/MIN: 10 INJECTION, EMULSION INTRAVENOUS at 15:37

## 2020-12-09 RX ADMIN — FAMOTIDINE 20 MG: 20 TABLET, FILM COATED ORAL at 13:16

## 2020-12-09 RX ADMIN — MIDAZOLAM 2 MG: 1 INJECTION INTRAMUSCULAR; INTRAVENOUS at 15:32

## 2020-12-09 ASSESSMENT — ENCOUNTER SYMPTOMS
ABDOMINAL PAIN: 0
SORE THROAT: 0
EYE PAIN: 0
COUGH: 0
SHORTNESS OF BREATH: 0
EYE REDNESS: 0
ABDOMINAL DISTENTION: 0

## 2020-12-09 ASSESSMENT — PAIN SCALES - GENERAL
PAINLEVEL_OUTOF10: 0

## 2020-12-09 ASSESSMENT — LIFESTYLE VARIABLES: SMOKING_STATUS: 0

## 2020-12-09 ASSESSMENT — PAIN - FUNCTIONAL ASSESSMENT: PAIN_FUNCTIONAL_ASSESSMENT: 0-10

## 2020-12-09 NOTE — ANESTHESIA PRE PROCEDURE
Department of Anesthesiology  Preprocedure Note       Name:  Yi Pi   Age:  63 y.o.  :  1960                                          MRN:  [id number]         Date:  2020      Surgeon: Mark Seo):  Nisha Salinas MD    Procedure: Procedure(s):  PD CATHETET EXTERNALIZATION    Medications prior to admission:   Prior to Admission medications    Medication Sig Start Date End Date Taking? Authorizing Provider   vitamin D (ERGOCALCIFEROL) 1.25 MG (00786 UT) CAPS capsule Take 50,000 Units by mouth once a week    Historical Provider, MD   B Complex Vitamins (VITAMIN-B COMPLEX PO) Take 1 tablet by mouth daily     Historical Provider, MD   levothyroxine (SYNTHROID) 88 MCG tablet Take 88 mcg by mouth Daily     Historical Provider, MD       Current medications:    No current facility-administered medications for this encounter.         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    CKD (chronic kidney disease) stage V requiring chronic dialysis (HCC) N18.6, Z99.2       Past Medical History:        Diagnosis Date    [facility] fibrillation ([facility]     CKD (chronic kidney disease)     Hemodialysis patient ([facility]     History of hip surgery     Hypertension     Hypothyroidism     Lymphedema        Past Surgical History:        Procedure Laterality Date    BACK SURGERY      lumbar    DIALYSIS FISTULA CREATION Left 10/26/2020    CREATION AV ACCESS performed by Lindy Aponte DO at [address] [facility] Left     replacement    IR NONTUNNELED VASCULAR CATHETER Right     LAPAROSCOPY INSERTION PERITONEAL CATHETER N/A 10/28/2020    LAPAROSCOPIC PERITONEAL DIIALYSIS CATHETER PLACEMENT, BURIED performed by Nisha Salinas MD at [place] [address] History:    Social History     Tobacco Use    Smoking status: Former Smoker     Types: Cigarettes     Last attempt to quit: 2014     Years since quittin.9    Smokeless tobacco: Never Used   Substance Use Topics    Alcohol use: Never     Frequency: Never                                Counseling given: Not Answered      Vital Signs (Current):   Vitals:    20 1037   Weight: 250 lb (113.4 kg)                                              BP Readings from Last 3 Encounters:   20 138/74   10/28/20 (!) 151/77   10/28/20 136/63       NPO Status:                                                                                 BMI:   Wt Readings from Last 3 Encounters:   20 250 lb (113.4 kg)   20 267 lb 6.4 oz (121.3 kg)   10/28/20 255 lb (115.7 kg)     Body mass index is 35.87 kg/m². CBC:   Lab Results   Component Value Date    WBC 12.6 10/28/2020    RBC 3.50 10/28/2020    HGB 10.2 10/28/2020    HCT 33.9 10/28/2020    MCV 96.9 10/28/2020    RDW 14.7 10/28/2020     10/28/2020       CMP:   Lab Results   Component Value Date     10/28/2020    K 4.3 10/28/2020    CL 98 10/28/2020    CO2 27 10/28/2020    BUN 42 10/28/2020    CREATININE 6.5 10/28/2020    GFRAA 11 10/28/2020    LABGLOM 9 10/28/2020    GLUCOSE 98 10/28/2020    PROT 5.6 10/17/2019    CALCIUM 9.5 10/28/2020    BILITOT 0.3 10/17/2019    ALKPHOS 66 10/17/2019    AST 13 10/17/2019    ALT 7 10/17/2019       POC Tests: No results for input(s): POCGLU, POCNA, POCK, POCCL, POCBUN, POCHEMO, POCHCT in the last 72 hours.     Coags:   Lab Results   Component Value Date    PROTIME 14.5 10/28/2020    INR 1.14 10/28/2020    APTT 29.1 10/28/2020       HCG (If Applicable): No results found for: PREGTESTUR, PREGSERUM, HCG, HCGQUANT     ABGs: No results found for: PHART, PO2ART, NKZ0LAC, EBF7EXF, BEART, F9LFWADE     Type & Screen (If Applicable):  No results found for: LABABO, LABRH    Drug/Infectious Status (If Applicable):  No results found for: HIV, HEPCAB    COVID-19 Screening (If Applicable):   Lab Results   Component Value Date    COVID19 Not Detected 2020 Anesthesia Evaluation  Patient summary reviewed no history of anesthetic complications:   Airway: Mallampati: III  TM distance: >3 FB   Neck ROM: full  Mouth opening: > = 3 FB Dental:    (+) edentulous      Pulmonary:normal exam  breath sounds clear to auscultation      (-) asthma, recent URI, sleep apnea and not a current smoker                           Cardiovascular:  Exercise tolerance: good (>4 METS),   (+) dysrhythmias (20 yrs ago, no issues since): atrial fibrillation,     (-) pacemaker, hypertension (None since starting dialysis), past MI, CABG/stent and  angina    ECG reviewed  Rhythm: regular  Rate: normal           Beta Blocker:  Not on Beta Blocker         Neuro/Psych:      (-) seizures, TIA and CVA           GI/Hepatic/Renal:   (+) renal disease (Dialysis 12/8/20): ESRD,      (-) GERD and liver disease       Endo/Other:    (+) hypothyroidism::., .    (-) diabetes mellitus, hyperthyroidism               Abdominal:           Vascular:                                        Anesthesia Plan      MAC     ASA 4     (Preop famotidine)  Induction: intravenous. MIPS: Postoperative opioids intended and Prophylactic antiemetics administered. Anesthetic plan and risks discussed with patient. Use of blood products discussed with patient whom consented to blood products. Plan discussed with CRNA.                 Caryle Grief, MD   12/9/2020

## 2020-12-09 NOTE — DISCHARGE INSTR - DIET
Good nutrition is important when healing from an illness, injury, or surgery. Follow any nutrition recommendations given to you during your hospital stay. If you were given an oral nutrition supplement while in the hospital, continue to take this supplement at home. You can take it with meals, in-between meals, and/or before bedtime. These supplements can be purchased at most local grocery stores, pharmacies, and chain [employer]-stores. If you have any questions about your diet or nutrition, call the hospital and ask for the dietitian.       Regular diet as tolerated

## 2020-12-09 NOTE — H&P
benefits of PD catheter externalization were discussed with the patient, including bleeding and infection, and he is agreeable to continuing with scheduling surgery.     Rafa Stein MD  12/9/2020

## 2020-12-09 NOTE — BRIEF OP NOTE
Brief Postoperative Note      Patient: Charly Chávez  YOB: 1960  MRN: [id number]    Date of Procedure: 12/9/2020    Pre-Op Diagnosis: ESRD    Post-Op Diagnosis: Same       Procedure(s):  PD CATHETET EXTERNALIZATION    Surgeon(s):  Latanya Helton MD    Assistant:  * No surgical staff found *    Anesthesia: Monitor Anesthesia Care    Estimated Blood Loss (mL): Minimal    Complications: None    Specimens:   * No specimens in log *    Implants:  * No implants in log *      Drains:   [REMOVED] Urethral Catheter Double-lumen 16 fr (Removed)       Findings: tube externalized and flushed with warm saline, fibrin aspirated, more flushed and aspirated.     Electronically signed by Latanya Helton MD on 12/9/2020 at 3:53 PM

## 2020-12-09 NOTE — ANESTHESIA POSTPROCEDURE EVALUATION
Department of Anesthesiology  Postprocedure Note    Patient: Chele Kumar  MRN: [id number]  YOB: 1960  Date of evaluation: 12/9/2020  Time:  4:01 PM     Procedure Summary     Date:  12/09/20 Room / Location:  [address]    Anesthesia Start:  9838 Anesthesia Stop:  9220    Procedure:  PD CATHETET EXTERNALIZATION (N/A ) Diagnosis:  (ESRD)    Surgeon:  Yaakov Montoya MD Responsible Provider:  CHERYL Hanna CRNA    Anesthesia Type:  MAC ASA Status:  4          Anesthesia Type: MAC    Angelita Phase I: Angelita Score: 10    Angelita Phase II:      Last vitals: Reviewed and per EMR flowsheets.        Anesthesia Post Evaluation    Patient location during evaluation: PACU  Patient participation: complete - patient participated  Level of consciousness: awake and alert  Pain score: 0  Airway patency: patent  Nausea & Vomiting: no nausea and no vomiting  Complications: no  Cardiovascular status: hemodynamically stable  Respiratory status: acceptable  Hydration status: euvolemic

## 2020-12-14 ENCOUNTER — HOSPITAL ENCOUNTER (OUTPATIENT)
Dept: GENERAL RADIOLOGY | Age: 60
Discharge: HOME | End: 2020-12-14
Payer: COMMERCIAL

## 2020-12-14 PROCEDURE — 74018 RADEX ABDOMEN 1 VIEW: CPT

## 2020-12-16 ENCOUNTER — HOSPITAL ENCOUNTER (OUTPATIENT)
Dept: VASCULAR LAB | Age: 60
Discharge: HOME | End: 2020-12-16
Payer: COMMERCIAL

## 2020-12-16 PROCEDURE — 93931 UPPER EXTREMITY STUDY: CPT

## 2020-12-20 NOTE — OP NOTE
Operative Report    PATIENT NAME: Andrea Mohamud  MEDICAL RECORD NO. [id number]  SURGEON: Denys Aranda MD   Primary Care Physician: Inez Delgadillo MD  Date: 12/9/2020   PROCEDURE PERFORMED: externalization of PD catheter  PREOPERATIVE DIAGNOSIS: ESRD with PD catheter buried in place  POSTOPERATIVE DIAGNOSIS: Same  SURGEON:  Denys Aranda MD   ANESTHESIA:  MAC  ESTIMATED BLOOD LOSS: minimal  SPECIMEN:  none  COMPLICATIONS:  None; patient tolerated the procedure well. FINDINGS: Catheter able to be flushed and aspirated   DISPOSITION: PACU - hemodynamically stable. CONDITION: stable      Indications: The patient was taken for PD catheter placement previously. He is now ready to begin PD training. He is being taken at this time for externalization. Procedure Details     After the risks and benefits of the procedure were explained, informed consent was obtained, and the patient was taken to the operating room. The patient was placed in supine position and anesthesia was begun. The abdomen was prepped and draped in normal sterile fashion. A time out was performed. Local anesthetic was injected at the right side incision over the buried catheter. Soft tissue was bluntly dissected down to the titanium cap, which was then grasped and elevated out of the incision. The plastic cap was taken off and the catheter was flushed with 25 ml of saline. It was then aspirated and some fibrin was aspirated as well as saline. The catheter was then flushed and aspirated again. The titanium end cap was connected to extension tubing and a betadyne cap, and then covered with sterile dressings. The patient tolerated the procedure well, was removed from anesthesia, and transferred to the recovery area in stable condition.                   Denys Aranda MD   Electronically signed 12/20/20 1:47 PM

## 2020-12-21 ENCOUNTER — OFFICE VISIT (OUTPATIENT)
Dept: SURGERY | Age: 60
End: 2020-12-21
Payer: COMMERCIAL

## 2020-12-21 VITALS
BODY MASS INDEX: 39.31 KG/M2 | SYSTOLIC BLOOD PRESSURE: 124 MMHG | WEIGHT: 274.6 LBS | TEMPERATURE: 98.2 F | HEIGHT: 70 IN | DIASTOLIC BLOOD PRESSURE: 70 MMHG

## 2020-12-21 PROCEDURE — 99212 OFFICE O/P EST SF 10 MIN: CPT | Performed by: SURGERY

## 2020-12-21 NOTE — LETTER
Preop Orders:     Patient: Payton Portillo  : 1960    Hospital: [facility]    Admitting Physician:  Dr. Le Omalley    Diagnosis: dysfunctioning peritoneal dialysis catheter    Procedure: laparoscopic repositioning PD catheter, possible revision    Time: 1 hour    Anesthesia: General    Admission: Outpatient     Date: to be scheduled    Labs: per anesthesia     Special Instructions:  none    Cardiac Clearance:  none    Special Equipment:  none    Pre-Op Meds:  ancef    Latex Allergy: no    Beta Blocker: no    Medication Instructions: none    Post op visit: 2 weeks post op      Electronically signed by Angelica Frey MD   On 20   @ 10:48 AM

## 2020-12-29 ENCOUNTER — TELEPHONE (OUTPATIENT)
Dept: VASCULAR SURGERY | Age: 60
End: 2020-12-29

## 2020-12-29 NOTE — TELEPHONE ENCOUNTER
----- Message from Lata Vora PA-C sent at 12/18/2020  1:16 PM CST -----  Please call and let Mr. Christy Tobias know that his fistula scan looked good, he needs an in office appt for me to eval, prior to allowing dialysis access his fistula (if scan good usually okay to use 6-8 weeks after)

## 2020-12-29 NOTE — TELEPHONE ENCOUNTER
I spoke with Rodrigo Davey and made hims aware that per [facility] , his fistula scan looked good. She wants to see him in 6-8 weeks to evaluate fistula prior to letting dialysis access his fistula. This appointment was made with patient via phone for Mon 1/25/21 at 9:00am in the office. Patient will call with any questions or concerns.

## 2020-12-31 NOTE — PROGRESS NOTES
Subjective:  Mr. Sadie Rosales is here to follow up concerning his PD catheter. He recently had it unburied. At that time, in the OR, it flushed and aspirated well. He then went to PD, and they have not been able to aspirate or flush it since then. They tried TPA. He had a KUB that did not show any obvious issues. Past Medical History[]Expand by Default        Past Medical History:   Diagnosis Date    Atrial fibrillation ([facility]      CKD (chronic kidney disease)      Hemodialysis patient ([facility]      History of hip surgery      Hypertension      Hypothyroidism      Lymphedema              Allergies: No Known Allergies     Active Problems:    * No active hospital problems. *  Resolved Problems:    * No resolved hospital problems. *     Blood pressure 138/77, pulse 89, temperature 99.3 °F (37.4 °C), temperature source Tympanic, resp. rate 16, height 5' 10\" (1.778 m), weight 250 lb (113.4 kg), SpO2 98 %.     Review of Systems   Constitutional: Negative for chills and fever. HENT: Negative for sore throat. Eyes: Negative for pain and redness. Respiratory: Negative for cough and shortness of breath. Cardiovascular: Negative for chest pain and palpitations. Gastrointestinal: Negative for abdominal distention and abdominal pain. Genitourinary: Negative for dysuria and hematuria. Skin: Negative for rash and wound. Neurological: Negative for dizziness and headaches.         Physical Exam  Vitals signs reviewed. Constitutional:       General: He is not in acute distress. HENT:      Head: Normocephalic and atraumatic. Eyes:      Pupils: Pupils are equal, round, and reactive to light. Cardiovascular:      Rate and Rhythm: Normal rate and regular rhythm. Pulmonary:      Effort: Pulmonary effort is normal. No respiratory distress. Abdominal:      General: There is no distension. Palpations: Abdomen is soft. Skin:     General: Skin is warm and dry.    Neurological: Mental Status: He is alert.          Assessment and plan:  63year old male with ESRD on HD with PD catheter in place  I discussed with the patient that we can return to the OR to see about repositioning and flushing the catheter. IF it still does not work with repositioning, then I would need to replace it at that time. The risks and benefits were discussed with the patient, including bleeding, infection, injury to surrounding structures, and need for open surgery. The patient expressed understanding and would like to proceed. Covid testing will be performed preop as well.

## 2021-01-04 ENCOUNTER — HOSPITAL ENCOUNTER (OUTPATIENT)
Dept: PREADMISSION TESTING | Age: 61
Discharge: HOME | End: 2021-01-08
Payer: COMMERCIAL

## 2021-01-04 VITALS — HEIGHT: 70 IN | WEIGHT: 260 LBS | BODY MASS INDEX: 37.22 KG/M2

## 2021-01-04 LAB
EKG P AXIS: 29 DEGREES
EKG P-R INTERVAL: 172 MS
EKG Q-T INTERVAL: 392 MS
EKG QRS DURATION: 80 MS
EKG QTC CALCULATION (BAZETT): 421 MS
EKG T AXIS: 59 DEGREES
SARS-COV-2, PCR: NOT DETECTED

## 2021-01-04 PROCEDURE — 93005 ELECTROCARDIOGRAM TRACING: CPT | Performed by: SURGERY

## 2021-01-04 PROCEDURE — 93010 ELECTROCARDIOGRAM REPORT: CPT | Performed by: INTERNAL MEDICINE

## 2021-01-04 PROCEDURE — U0003 INFECTIOUS AGENT DETECTION BY NUCLEIC ACID (DNA OR RNA); SEVERE ACUTE RESPIRATORY SYNDROME CORONAVIRUS 2 (SARS-COV-2) (CORONAVIRUS DISEASE [COVID-19]), AMPLIFIED PROBE TECHNIQUE, MAKING USE OF HIGH THROUGHPUT TECHNOLOGIES AS DESCRIBED BY CMS-2020-01-R: HCPCS

## 2021-01-06 ENCOUNTER — ANESTHESIA EVENT (OUTPATIENT)
Dept: OPERATING ROOM | Age: 61
End: 2021-01-06
Payer: COMMERCIAL

## 2021-01-06 ENCOUNTER — PREP FOR PROCEDURE (OUTPATIENT)
Dept: SURGERY | Age: 61
End: 2021-01-06

## 2021-01-06 ENCOUNTER — ANESTHESIA (OUTPATIENT)
Dept: OPERATING ROOM | Age: 61
End: 2021-01-06
Payer: COMMERCIAL

## 2021-01-06 ENCOUNTER — HOSPITAL ENCOUNTER (OUTPATIENT)
Age: 61
Setting detail: OUTPATIENT SURGERY
Discharge: HOME | End: 2021-01-06
Attending: SURGERY | Admitting: SURGERY
Payer: COMMERCIAL

## 2021-01-06 VITALS
DIASTOLIC BLOOD PRESSURE: 76 MMHG | SYSTOLIC BLOOD PRESSURE: 141 MMHG | BODY MASS INDEX: 37.22 KG/M2 | RESPIRATION RATE: 16 BRPM | TEMPERATURE: 98.1 F | WEIGHT: 260 LBS | OXYGEN SATURATION: 98 % | HEIGHT: 70 IN | HEART RATE: 75 BPM

## 2021-01-06 VITALS
RESPIRATION RATE: 2 BRPM | OXYGEN SATURATION: 100 % | TEMPERATURE: 97 F | DIASTOLIC BLOOD PRESSURE: 68 MMHG | SYSTOLIC BLOOD PRESSURE: 105 MMHG

## 2021-01-06 DIAGNOSIS — G89.18 POST-OP PAIN: Primary | ICD-10-CM

## 2021-01-06 LAB
ANION GAP SERPL CALCULATED.3IONS-SCNC: 18 MMOL/L (ref 7–19)
BUN BLDV-MCNC: 58 MG/DL (ref 8–23)
CALCIUM SERPL-MCNC: 9.2 MG/DL (ref 8.8–10.2)
CHLORIDE BLD-SCNC: 94 MMOL/L (ref 98–111)
CO2: 27 MMOL/L (ref 22–29)
CREAT SERPL-MCNC: 8.5 MG/DL (ref 0.5–1.2)
GFR AFRICAN AMERICAN: 8
GFR NON-AFRICAN AMERICAN: 6
GLUCOSE BLD-MCNC: 117 MG/DL (ref 74–109)
HCT VFR BLD CALC: 38 % (ref 42–52)
HEMOGLOBIN: 11.8 G/DL (ref 14–18)
MCH RBC QN AUTO: 29.5 PG (ref 27–31)
MCHC RBC AUTO-ENTMCNC: 31.1 G/DL (ref 33–37)
MCV RBC AUTO: 95 FL (ref 80–94)
PDW BLD-RTO: 16.2 % (ref 11.5–14.5)
PLATELET # BLD: 279 K/UL (ref 130–400)
PMV BLD AUTO: 9.3 FL (ref 9.4–12.4)
POTASSIUM SERPL-SCNC: 5.4 MMOL/L (ref 3.5–4.9)
RBC # BLD: 4 M/UL (ref 4.7–6.1)
SODIUM BLD-SCNC: 139 MMOL/L (ref 136–145)
WBC # BLD: 11.1 K/UL (ref 4.8–10.8)

## 2021-01-06 PROCEDURE — 6360000002 HC RX W HCPCS: Performed by: NURSE ANESTHETIST, CERTIFIED REGISTERED

## 2021-01-06 PROCEDURE — 7100000001 HC PACU RECOVERY - ADDTL 15 MIN: Performed by: SURGERY

## 2021-01-06 PROCEDURE — 3600000003 HC SURGERY LEVEL 3 BASE: Performed by: SURGERY

## 2021-01-06 PROCEDURE — 85027 COMPLETE CBC AUTOMATED: CPT

## 2021-01-06 PROCEDURE — 7100000000 HC PACU RECOVERY - FIRST 15 MIN: Performed by: SURGERY

## 2021-01-06 PROCEDURE — 6360000002 HC RX W HCPCS: Performed by: SURGERY

## 2021-01-06 PROCEDURE — 36415 COLL VENOUS BLD VENIPUNCTURE: CPT

## 2021-01-06 PROCEDURE — 2709999900 HC NON-CHARGEABLE SUPPLY: Performed by: SURGERY

## 2021-01-06 PROCEDURE — 2500000003 HC RX 250 WO HCPCS: Performed by: SURGERY

## 2021-01-06 PROCEDURE — 7100000011 HC PHASE II RECOVERY - ADDTL 15 MIN: Performed by: SURGERY

## 2021-01-06 PROCEDURE — 36595 MECH REMOV TUNNELED CV CATH: CPT | Performed by: SURGERY

## 2021-01-06 PROCEDURE — 2580000003 HC RX 258: Performed by: SURGERY

## 2021-01-06 PROCEDURE — C1750 CATH, HEMODIALYSIS,LONG-TERM: HCPCS | Performed by: SURGERY

## 2021-01-06 PROCEDURE — 3700000000 HC ANESTHESIA ATTENDED CARE: Performed by: SURGERY

## 2021-01-06 PROCEDURE — 2500000003 HC RX 250 WO HCPCS: Performed by: NURSE ANESTHETIST, CERTIFIED REGISTERED

## 2021-01-06 PROCEDURE — 3700000001 HC ADD 15 MINUTES (ANESTHESIA): Performed by: SURGERY

## 2021-01-06 PROCEDURE — 80048 BASIC METABOLIC PNL TOTAL CA: CPT

## 2021-01-06 PROCEDURE — 3600000013 HC SURGERY LEVEL 3 ADDTL 15MIN: Performed by: SURGERY

## 2021-01-06 PROCEDURE — 7100000010 HC PHASE II RECOVERY - FIRST 15 MIN: Performed by: SURGERY

## 2021-01-06 RX ORDER — METOCLOPRAMIDE HYDROCHLORIDE 5 MG/ML
10 INJECTION INTRAMUSCULAR; INTRAVENOUS
Status: DISCONTINUED | OUTPATIENT
Start: 2021-01-06 | End: 2021-01-06 | Stop reason: HOSPADM

## 2021-01-06 RX ORDER — SODIUM CHLORIDE 450 MG/100ML
INJECTION, SOLUTION INTRAVENOUS CONTINUOUS
Status: DISCONTINUED | OUTPATIENT
Start: 2021-01-06 | End: 2021-01-06 | Stop reason: HOSPADM

## 2021-01-06 RX ORDER — HYDROCODONE BITARTRATE AND ACETAMINOPHEN 5; 325 MG/1; MG/1
1 TABLET ORAL EVERY 4 HOURS PRN
Qty: 15 TABLET | Refills: 0 | Status: SHIPPED | OUTPATIENT
Start: 2021-01-06 | End: 2021-01-09

## 2021-01-06 RX ORDER — ROCURONIUM BROMIDE 10 MG/ML
INJECTION, SOLUTION INTRAVENOUS PRN
Status: DISCONTINUED | OUTPATIENT
Start: 2021-01-06 | End: 2021-01-06 | Stop reason: SDUPTHER

## 2021-01-06 RX ORDER — SODIUM CHLORIDE, SODIUM LACTATE, POTASSIUM CHLORIDE, CALCIUM CHLORIDE 600; 310; 30; 20 MG/100ML; MG/100ML; MG/100ML; MG/100ML
INJECTION, SOLUTION INTRAVENOUS CONTINUOUS
Status: DISCONTINUED | OUTPATIENT
Start: 2021-01-06 | End: 2021-01-06 | Stop reason: HOSPADM

## 2021-01-06 RX ORDER — ONDANSETRON 2 MG/ML
4 INJECTION INTRAMUSCULAR; INTRAVENOUS EVERY 6 HOURS PRN
Status: DISCONTINUED | OUTPATIENT
Start: 2021-01-06 | End: 2021-01-06 | Stop reason: HOSPADM

## 2021-01-06 RX ORDER — PROMETHAZINE HYDROCHLORIDE 25 MG/ML
6.25 INJECTION, SOLUTION INTRAMUSCULAR; INTRAVENOUS
Status: DISCONTINUED | OUTPATIENT
Start: 2021-01-06 | End: 2021-01-06 | Stop reason: HOSPADM

## 2021-01-06 RX ORDER — SODIUM CHLORIDE 0.9 % (FLUSH) 0.9 %
10 SYRINGE (ML) INJECTION EVERY 12 HOURS SCHEDULED
Status: DISCONTINUED | OUTPATIENT
Start: 2021-01-06 | End: 2021-01-06 | Stop reason: HOSPADM

## 2021-01-06 RX ORDER — FENTANYL CITRATE 50 UG/ML
25 INJECTION, SOLUTION INTRAMUSCULAR; INTRAVENOUS
Status: DISCONTINUED | OUTPATIENT
Start: 2021-01-06 | End: 2021-01-06 | Stop reason: HOSPADM

## 2021-01-06 RX ORDER — HYDROMORPHONE HYDROCHLORIDE 1 MG/ML
0.5 INJECTION, SOLUTION INTRAMUSCULAR; INTRAVENOUS; SUBCUTANEOUS EVERY 5 MIN PRN
Status: DISCONTINUED | OUTPATIENT
Start: 2021-01-06 | End: 2021-01-06 | Stop reason: HOSPADM

## 2021-01-06 RX ORDER — DIPHENHYDRAMINE HYDROCHLORIDE 50 MG/ML
12.5 INJECTION INTRAMUSCULAR; INTRAVENOUS
Status: DISCONTINUED | OUTPATIENT
Start: 2021-01-06 | End: 2021-01-06 | Stop reason: HOSPADM

## 2021-01-06 RX ORDER — SODIUM CHLORIDE 0.9 % (FLUSH) 0.9 %
10 SYRINGE (ML) INJECTION PRN
Status: DISCONTINUED | OUTPATIENT
Start: 2021-01-06 | End: 2021-01-06 | Stop reason: HOSPADM

## 2021-01-06 RX ORDER — LABETALOL HYDROCHLORIDE 5 MG/ML
5 INJECTION, SOLUTION INTRAVENOUS EVERY 10 MIN PRN
Status: DISCONTINUED | OUTPATIENT
Start: 2021-01-06 | End: 2021-01-06 | Stop reason: HOSPADM

## 2021-01-06 RX ORDER — DEXAMETHASONE SODIUM PHOSPHATE 10 MG/ML
INJECTION, SOLUTION INTRAMUSCULAR; INTRAVENOUS PRN
Status: DISCONTINUED | OUTPATIENT
Start: 2021-01-06 | End: 2021-01-06 | Stop reason: SDUPTHER

## 2021-01-06 RX ORDER — MIDAZOLAM HYDROCHLORIDE 1 MG/ML
2 INJECTION INTRAMUSCULAR; INTRAVENOUS
Status: DISCONTINUED | OUTPATIENT
Start: 2021-01-06 | End: 2021-01-06 | Stop reason: HOSPADM

## 2021-01-06 RX ORDER — HYDROCODONE BITARTRATE AND ACETAMINOPHEN 5; 325 MG/1; MG/1
2 TABLET ORAL EVERY 4 HOURS PRN
Status: DISCONTINUED | OUTPATIENT
Start: 2021-01-06 | End: 2021-01-06 | Stop reason: HOSPADM

## 2021-01-06 RX ORDER — PROMETHAZINE HYDROCHLORIDE 25 MG/1
12.5 TABLET ORAL EVERY 6 HOURS PRN
Status: DISCONTINUED | OUTPATIENT
Start: 2021-01-06 | End: 2021-01-06 | Stop reason: HOSPADM

## 2021-01-06 RX ORDER — HYDROMORPHONE HYDROCHLORIDE 1 MG/ML
0.25 INJECTION, SOLUTION INTRAMUSCULAR; INTRAVENOUS; SUBCUTANEOUS EVERY 5 MIN PRN
Status: DISCONTINUED | OUTPATIENT
Start: 2021-01-06 | End: 2021-01-06 | Stop reason: HOSPADM

## 2021-01-06 RX ORDER — ENALAPRILAT 2.5 MG/2ML
1.25 INJECTION INTRAVENOUS
Status: DISCONTINUED | OUTPATIENT
Start: 2021-01-06 | End: 2021-01-06 | Stop reason: HOSPADM

## 2021-01-06 RX ORDER — FENTANYL CITRATE 50 UG/ML
INJECTION, SOLUTION INTRAMUSCULAR; INTRAVENOUS PRN
Status: DISCONTINUED | OUTPATIENT
Start: 2021-01-06 | End: 2021-01-06 | Stop reason: SDUPTHER

## 2021-01-06 RX ORDER — MEPERIDINE HYDROCHLORIDE 50 MG/ML
12.5 INJECTION INTRAMUSCULAR; INTRAVENOUS; SUBCUTANEOUS EVERY 5 MIN PRN
Status: DISCONTINUED | OUTPATIENT
Start: 2021-01-06 | End: 2021-01-06 | Stop reason: HOSPADM

## 2021-01-06 RX ORDER — HYDRALAZINE HYDROCHLORIDE 20 MG/ML
5 INJECTION INTRAMUSCULAR; INTRAVENOUS EVERY 10 MIN PRN
Status: DISCONTINUED | OUTPATIENT
Start: 2021-01-06 | End: 2021-01-06 | Stop reason: HOSPADM

## 2021-01-06 RX ORDER — LIDOCAINE HYDROCHLORIDE 10 MG/ML
INJECTION, SOLUTION EPIDURAL; INFILTRATION; INTRACAUDAL; PERINEURAL PRN
Status: DISCONTINUED | OUTPATIENT
Start: 2021-01-06 | End: 2021-01-06 | Stop reason: SDUPTHER

## 2021-01-06 RX ORDER — MORPHINE SULFATE 4 MG/ML
2 INJECTION, SOLUTION INTRAMUSCULAR; INTRAVENOUS
Status: DISCONTINUED | OUTPATIENT
Start: 2021-01-06 | End: 2021-01-06 | Stop reason: HOSPADM

## 2021-01-06 RX ORDER — FENTANYL CITRATE 50 UG/ML
50 INJECTION, SOLUTION INTRAMUSCULAR; INTRAVENOUS
Status: DISCONTINUED | OUTPATIENT
Start: 2021-01-06 | End: 2021-01-06 | Stop reason: HOSPADM

## 2021-01-06 RX ORDER — PROPOFOL 10 MG/ML
INJECTION, EMULSION INTRAVENOUS PRN
Status: DISCONTINUED | OUTPATIENT
Start: 2021-01-06 | End: 2021-01-06 | Stop reason: SDUPTHER

## 2021-01-06 RX ORDER — LIDOCAINE HYDROCHLORIDE 10 MG/ML
1 INJECTION, SOLUTION EPIDURAL; INFILTRATION; INTRACAUDAL; PERINEURAL
Status: DISCONTINUED | OUTPATIENT
Start: 2021-01-06 | End: 2021-01-06 | Stop reason: HOSPADM

## 2021-01-06 RX ORDER — SODIUM CHLORIDE 9 MG/ML
INJECTION, SOLUTION INTRAVENOUS CONTINUOUS
Status: DISCONTINUED | OUTPATIENT
Start: 2021-01-06 | End: 2021-01-06 | Stop reason: HOSPADM

## 2021-01-06 RX ORDER — MORPHINE SULFATE 4 MG/ML
4 INJECTION, SOLUTION INTRAMUSCULAR; INTRAVENOUS
Status: DISCONTINUED | OUTPATIENT
Start: 2021-01-06 | End: 2021-01-06 | Stop reason: HOSPADM

## 2021-01-06 RX ORDER — MORPHINE SULFATE 4 MG/ML
4 INJECTION, SOLUTION INTRAMUSCULAR; INTRAVENOUS EVERY 5 MIN PRN
Status: DISCONTINUED | OUTPATIENT
Start: 2021-01-06 | End: 2021-01-06 | Stop reason: HOSPADM

## 2021-01-06 RX ORDER — BUPIVACAINE HYDROCHLORIDE 2.5 MG/ML
INJECTION, SOLUTION INFILTRATION; PERINEURAL PRN
Status: DISCONTINUED | OUTPATIENT
Start: 2021-01-06 | End: 2021-01-06 | Stop reason: ALTCHOICE

## 2021-01-06 RX ORDER — ONDANSETRON 2 MG/ML
INJECTION INTRAMUSCULAR; INTRAVENOUS PRN
Status: DISCONTINUED | OUTPATIENT
Start: 2021-01-06 | End: 2021-01-06

## 2021-01-06 RX ORDER — MORPHINE SULFATE 4 MG/ML
2 INJECTION, SOLUTION INTRAMUSCULAR; INTRAVENOUS EVERY 5 MIN PRN
Status: DISCONTINUED | OUTPATIENT
Start: 2021-01-06 | End: 2021-01-06 | Stop reason: HOSPADM

## 2021-01-06 RX ORDER — HYDROCODONE BITARTRATE AND ACETAMINOPHEN 5; 325 MG/1; MG/1
1 TABLET ORAL EVERY 4 HOURS PRN
Status: DISCONTINUED | OUTPATIENT
Start: 2021-01-06 | End: 2021-01-06 | Stop reason: HOSPADM

## 2021-01-06 RX ADMIN — PROPOFOL 200 MG: 10 INJECTION, EMULSION INTRAVENOUS at 14:46

## 2021-01-06 RX ADMIN — FENTANYL CITRATE 50 MCG: 50 INJECTION, SOLUTION INTRAMUSCULAR; INTRAVENOUS at 14:42

## 2021-01-06 RX ADMIN — FENTANYL CITRATE 100 MCG: 50 INJECTION, SOLUTION INTRAMUSCULAR; INTRAVENOUS at 15:09

## 2021-01-06 RX ADMIN — SODIUM CHLORIDE: 4.5 INJECTION, SOLUTION INTRAVENOUS at 13:23

## 2021-01-06 RX ADMIN — LIDOCAINE HYDROCHLORIDE 50 MG: 10 INJECTION, SOLUTION EPIDURAL; INFILTRATION; INTRACAUDAL; PERINEURAL at 14:46

## 2021-01-06 RX ADMIN — FENTANYL CITRATE 50 MCG: 50 INJECTION, SOLUTION INTRAMUSCULAR; INTRAVENOUS at 15:05

## 2021-01-06 RX ADMIN — Medication 2 G: at 14:50

## 2021-01-06 RX ADMIN — ROCURONIUM BROMIDE 50 MG: 10 INJECTION, SOLUTION INTRAVENOUS at 14:46

## 2021-01-06 RX ADMIN — SUGAMMADEX 250 MG: 100 INJECTION, SOLUTION INTRAVENOUS at 15:21

## 2021-01-06 RX ADMIN — FENTANYL CITRATE 50 MCG: 50 INJECTION, SOLUTION INTRAMUSCULAR; INTRAVENOUS at 15:03

## 2021-01-06 RX ADMIN — ONDANSETRON HYDROCHLORIDE 4 MG: 2 INJECTION, SOLUTION INTRAMUSCULAR; INTRAVENOUS at 15:20

## 2021-01-06 RX ADMIN — DEXAMETHASONE SODIUM PHOSPHATE 10 MG: 10 INJECTION, SOLUTION INTRAMUSCULAR; INTRAVENOUS at 15:00

## 2021-01-06 ASSESSMENT — LIFESTYLE VARIABLES: SMOKING_STATUS: 0

## 2021-01-06 ASSESSMENT — PAIN SCALES - GENERAL: PAINLEVEL_OUTOF10: 0

## 2021-01-06 NOTE — DISCHARGE INSTR - ACTIVITY
Activity as tolerated except no driving if taking pain medication. Okay to shower over incisions but do not submerge under water like a tub or pool.

## 2021-01-06 NOTE — ANESTHESIA POSTPROCEDURE EVALUATION
Department of Anesthesiology  Postprocedure Note    Patient: Royal Vanessa  MRN: [id number]  YOB: 1960  Date of evaluation: 1/6/2021  Time:  3:38 PM     Procedure Summary     Date: 01/06/21 Room / Location: [address]    Anesthesia Start: 3804 Anesthesia Stop:     Procedure: LAPAROSCOPIC REPOSTITIONING PD CATHETER (N/A ) Diagnosis: (DYSFUNCTIONING PERITONEAL DIALYSIS CATHETER)    Surgeons: uSyapa Vinson MD Responsible Provider: CHERYL Kaufman CRNA    Anesthesia Type: general ASA Status: 4          Anesthesia Type: general    Angelita Phase I: Angelita Score: 10    Angelita Phase II:      Last vitals: Reviewed and per EMR flowsheets.        Anesthesia Post Evaluation    Patient location during evaluation: PACU  Patient participation: waiting for patient participation  Airway patency: patent  Nausea & Vomiting: no nausea and no vomiting  Complications: no  Cardiovascular status: hemodynamically stable  Respiratory status: face mask, spontaneous ventilation and acceptable  Hydration status: euvolemic

## 2021-01-06 NOTE — ANESTHESIA PRE PROCEDURE
POC Tests: No results for input(s): POCGLU, POCNA, POCK, POCCL, POCBUN, POCHEMO, POCHCT in the last 72 hours. Coags:   Lab Results   Component Value Date    PROTIME 14.5 10/28/2020    INR 1.14 10/28/2020    APTT 29.1 10/28/2020       HCG (If Applicable): No results found for: PREGTESTUR, PREGSERUM, HCG, HCGQUANT     ABGs: No results found for: PHART, PO2ART, OVJ0LPJ, BEF9UMZ, BEART, [id number]     Type & Screen (If Applicable):  No results found for: LABABO, LABRH    Drug/Infectious Status (If Applicable):  No results found for: HIV, HEPCAB    COVID-19 Screening (If Applicable):   Lab Results   Component Value Date    COVID19 Not Detected 01/04/2021         Anesthesia Evaluation  Patient summary reviewed no history of anesthetic complications:   Airway: Mallampati: III  TM distance: >3 FB   Neck ROM: full  Mouth opening: > = 3 FB Dental:    (+) edentulous      Pulmonary:normal exam  breath sounds clear to auscultation      (-) asthma, recent URI, sleep apnea and not a current smoker                           Cardiovascular:  Exercise tolerance: good (>4 METS),   (+) dysrhythmias (20 yrs ago, no issues since): atrial fibrillation,     (-) pacemaker, hypertension (None since starting dialysis), past MI, CABG/stent and  angina    ECG reviewed  Rhythm: regular  Rate: normal           Beta Blocker:  Not on Beta Blocker         Neuro/Psych:      (-) seizures, TIA and CVA           GI/Hepatic/Renal:   (+) renal disease (last dialysis yesterday): ESRD and dialysis,      (-) GERD and liver disease       Endo/Other:    (+) hypothyroidism::., electrolyte abnormalities, .    (-) diabetes mellitus, hyperthyroidism               Abdominal:           Vascular:     - DVT and PE. Anesthesia Plan      general     ASA 4       Induction: intravenous. MIPS: Postoperative opioids intended and Prophylactic antiemetics administered. Anesthetic plan and risks discussed with patient.

## 2021-01-06 NOTE — H&P (VIEW-ONLY)
Signed             Show:Clear all  [x]Manual[]Template[x]Copied    Added by:  [x]Yin Mccoy MD    []Francois for details  Subjective:  Mr. Abhishek Cruz is here to follow up concerning his PD catheter. He recently had it unburied. At that time, in the OR, it flushed and aspirated well. He then went to PD, and they have not been able to aspirate or flush it since then. They tried TPA. He had a KUB that did not show any obvious issues.     Past Medical History[]? Expand by Default           Past Medical History:   Diagnosis Date    Atrial fibrillation (HCC)      CKD (chronic kidney disease)      Hemodialysis patient ([facility]      History of hip surgery      Hypertension      Hypothyroidism      Lymphedema              Allergies: No Known Allergies     Active Problems:    * No active hospital problems. *  Resolved Problems:    * No resolved hospital problems. *     Blood pressure 138/77, pulse 89, temperature 99.3 °F (37.4 °C), temperature source Tympanic, resp. rate 16, height 5' 10\" (1.778 m), weight 250 lb (113.4 kg), SpO2 98 %.     Review of Systems   Constitutional: Negative for chills and fever. HENT: Negative for sore throat.    Eyes: Negative for pain and redness. Respiratory: Negative for cough and shortness of breath.    Cardiovascular: Negative for chest pain and palpitations. Gastrointestinal: Negative for abdominal distention and abdominal pain. Genitourinary: Negative for dysuria and hematuria. Skin: Negative for rash and wound. Neurological: Negative for dizziness and headaches.         Physical Exam  Vitals signs reviewed. Constitutional:       General: He is not in acute distress. HENT:      Head: Normocephalic and atraumatic. Eyes:      Pupils: Pupils are equal, round, and reactive to light. Cardiovascular:      Rate and Rhythm: Normal rate and regular rhythm. Pulmonary:      Effort: Pulmonary effort is normal. No respiratory distress.    Abdominal:    General: There is no distension.      Palpations: Abdomen is soft. Skin:     General: Skin is warm and dry. Neurological:      Mental Status: He is alert.          Assessment and plan:  63year old male with ESRD on HD with PD catheter in place  I discussed with the patient that we can return to the OR to see about repositioning and flushing the catheter. IF it still does not work with repositioning, then I would need to replace it at that time. The risks and benefits were discussed with the patient, including bleeding, infection, injury to surrounding structures, and need for open surgery. The patient expressed understanding and would like to proceed. Covid testing will be performed preop as well.

## 2021-01-06 NOTE — BRIEF OP NOTE
Brief Postoperative Note      Patient: Wilman Daniel  YOB: 1960  MRN: [id number]    Date of Procedure: 1/6/2021    Pre-Op Diagnosis: DYSFUNCTIONING PERITONEAL DIALYSIS CATHETER    Post-Op Diagnosis: Same       Procedure(s):  LAPAROSCOPIC REPOSTITIONING PD CATHETER    Surgeon(s):  Leonides Izquierdo MD    Assistant:  * No surgical staff found *    Anesthesia: General    Estimated Blood Loss (mL): Minimal    Complications: None    Specimens:   * No specimens in log *    Implants:  * No implants in log *      Drains: * No LDAs found *    Findings: no acute findings. Catheter flushed, was deep in pelvis, did flush out a strip of fibrin. Instilled 500 and drained >250 easily.     Electronically signed by Leonides Izquierdo MD on 1/6/2021 at 3:26 PM

## 2021-01-08 NOTE — OP NOTE
placed back low, and hooked up to cysto tubing. 500 ml of saline were instilled, and more than 250ml were returned without difficulty. The abdominal cavity was inspected one last time. The catheter was in good position, and flushed once again easily with a syringe. The catheter was then connected to a new extension tubing and betadine cap. The laparoscopic equipment was removed, insufflation was removed, and the skin incisions were closed with 4-0 monocryl. The skin was closed with dermabond. The patient tolerated the procedure well, was removed from anesthesia, and transferred to the recovery area in stable condition.                   Adrián Sutherland MD   Electronically signed 01/08/21 3:02 PM

## 2021-01-19 DIAGNOSIS — D63.1 ANEMIA OF CHRONIC RENAL FAILURE, STAGE 5 (HCC): ICD-10-CM

## 2021-01-19 DIAGNOSIS — N18.5 ANEMIA OF CHRONIC RENAL FAILURE, STAGE 5 (HCC): ICD-10-CM

## 2021-01-19 DIAGNOSIS — D50.9 IRON DEFICIENCY ANEMIA, UNSPECIFIED IRON DEFICIENCY ANEMIA TYPE: ICD-10-CM

## 2021-01-19 DIAGNOSIS — N18.6 END STAGE RENAL DISEASE (HCC): ICD-10-CM

## 2021-01-19 RX ORDER — SODIUM CHLORIDE 9 MG/ML
INJECTION, SOLUTION INTRAVENOUS CONTINUOUS
Status: CANCELLED | OUTPATIENT
Start: 2021-01-19

## 2021-01-19 RX ORDER — EPINEPHRINE 1 MG/ML
0.3 INJECTION, SOLUTION, CONCENTRATE INTRAVENOUS PRN
Status: CANCELLED | OUTPATIENT
Start: 2021-01-19

## 2021-01-19 RX ORDER — DIPHENHYDRAMINE HYDROCHLORIDE 50 MG/ML
50 INJECTION INTRAMUSCULAR; INTRAVENOUS ONCE
Status: CANCELLED | OUTPATIENT
Start: 2021-01-19 | End: 2021-01-19

## 2021-01-19 RX ORDER — METHYLPREDNISOLONE SODIUM SUCCINATE 125 MG/2ML
125 INJECTION, POWDER, LYOPHILIZED, FOR SOLUTION INTRAMUSCULAR; INTRAVENOUS ONCE
Status: CANCELLED | OUTPATIENT
Start: 2021-01-19 | End: 2021-01-19

## 2021-01-19 RX ORDER — SODIUM CHLORIDE 0.9 % (FLUSH) 0.9 %
10 SYRINGE (ML) INJECTION PRN
Status: CANCELLED | OUTPATIENT
Start: 2021-01-19

## 2021-01-21 ENCOUNTER — OFFICE VISIT (OUTPATIENT)
Dept: SURGERY | Age: 61
End: 2021-01-21

## 2021-01-21 VITALS
BODY MASS INDEX: 40.52 KG/M2 | WEIGHT: 283 LBS | HEIGHT: 70 IN | HEART RATE: 82 BPM | OXYGEN SATURATION: 98 % | TEMPERATURE: 98.1 F

## 2021-01-21 DIAGNOSIS — T85.611A PERITONEAL DIALYSIS CATHETER DYSFUNCTION, INITIAL ENCOUNTER (HCC): Primary | ICD-10-CM

## 2021-01-21 PROCEDURE — 99024 POSTOP FOLLOW-UP VISIT: CPT | Performed by: SURGERY

## 2021-01-21 RX ORDER — CIPROFLOXACIN 500 MG/1
TABLET, FILM COATED ORAL
COMMUNITY
Start: 2021-01-13 | End: 2021-02-12 | Stop reason: ALTCHOICE

## 2021-01-21 RX ORDER — GENTAMICIN SULFATE 1 MG/G
CREAM TOPICAL PRN
COMMUNITY
Start: 2021-01-14

## 2021-01-21 NOTE — PROGRESS NOTES
Subjective:  Mr. Sarwat Soto is here to follow up after repositioning of his PD catheter. He reports that when he saw the PD nurses there was concern about erythema around the tube, so he was given antibiotics. The first time he ran after repositioning, it seemed really slow, and then the machine went out. He has a new one being delivered today. Objective:  Pulse 82   Temp 98.1 °F (36.7 °C) (Temporal)   Ht 5' 10\" (1.778 m)   Wt 283 lb (128.4 kg)   SpO2 98%   BMI 40.61 kg/m²   General: NAD, AAO  Chest: Regular, non-labored  Abdomen: Soft, ND, mild erythema around drain site, no exudate or induration    Assessment and plan:  63year old male s/p laparoscopic repositioning of PD catheter  1) Continue with PD with new machine. It flushed easily in the OR, so hopefully his recent issues were machine related  2) Follow up in general surgery as needed, and call for any questions or concerns.

## 2021-01-22 ENCOUNTER — HOSPITAL ENCOUNTER (OUTPATIENT)
Dept: GENERAL RADIOLOGY | Age: 61
Discharge: HOME | End: 2021-01-22
Payer: COMMERCIAL

## 2021-01-22 DIAGNOSIS — T85.621D: ICD-10-CM

## 2021-01-22 PROCEDURE — 74018 RADEX ABDOMEN 1 VIEW: CPT

## 2021-01-25 ENCOUNTER — OFFICE VISIT (OUTPATIENT)
Dept: VASCULAR SURGERY | Age: 61
End: 2021-01-25

## 2021-01-25 VITALS
SYSTOLIC BLOOD PRESSURE: 141 MMHG | BODY MASS INDEX: 40.09 KG/M2 | OXYGEN SATURATION: 98 % | WEIGHT: 280 LBS | TEMPERATURE: 98 F | HEART RATE: 78 BPM | HEIGHT: 70 IN | DIASTOLIC BLOOD PRESSURE: 75 MMHG | RESPIRATION RATE: 16 BRPM

## 2021-01-25 DIAGNOSIS — N18.6 ESRD ON DIALYSIS (HCC): Primary | ICD-10-CM

## 2021-01-25 DIAGNOSIS — Z99.2 ESRD ON DIALYSIS (HCC): Primary | ICD-10-CM

## 2021-01-25 PROCEDURE — 99024 POSTOP FOLLOW-UP VISIT: CPT | Performed by: PHYSICIAN ASSISTANT

## 2021-01-25 NOTE — LETTER
New guidelines require a COVID 19 test to be done prior to procedure and we suggest self quarantine after the test until procedure. You will need to go to the [facility] on Monday 2/8/21 by 11:00am for the COVID 19 test. They open at 8:00am. You will go to the front of the building and drive under the awning and someone will come to your car. Do not get out of the car. Let them know that you are scheduled for a procedure with Dr. Lanie Miles on 2/12/21. After your COVID 19 test we suggest that you self quarantine until your procedure. PLEASE NOTE:  If the patient is unable to sign his/her own paperwork, the appointed caregiver (POA, child, sibling, etc) must be present at the time of registration for all testing and procedures. Transportation to and from all testing and procedure appointments is the sole responsibility of the patient, caregiver, and/or nursing facility in which they reside. Please remember you will not be able to drive after you are discharged. Please call the office at [phone] with any questions or concerns. Please allow 48-72 hours notice for cancellations or rescheduling. We will attempt to accommodate your needs to the best of our capabilities, however, strict policies with procedure room availability does not allow much flexibility.

## 2021-01-25 NOTE — PROGRESS NOTES
Patient Care Team:  Ganesh Allison MD as PCP - General (Family Medicine)  Clarice Barton DO as Consulting Physician (Vascular Surgery)       Cindy Rodriguez (:  1960) is a 63 y.o. male,Established patient, here for evaluation of the following chief complaint(s):  Follow-up (6-8 wk f/u to evaluate fistula to see if dialysis can cannulate)      SUBJECTIVE/OBJECTIVE:  Zuleyka Blakely has a history of chronic kidney disease. He is currently doing PD. He has a Permcath. He underwent a BURT AV fistula creation on 10/26/2020. He comes in today for evaluation of his AV fistula. He reports a little numbness on the FA. No pain or signs of steal left hand. Cindy Rodriguez is a 63 y.o. 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 2020     Current Outpatient Medications   Medication Sig Dispense Refill    SODIUM BICARBONATE PO Take by mouth 2 times daily      gentamicin (GARAMYCIN) 0.1 % cream APPLY TO THE AFFECTED AREA AS DIRECTED      vitamin D (ERGOCALCIFEROL) 1.25 MG (87329 UT) CAPS capsule Take 50,000 Units by mouth once a week      B Complex Vitamins (VITAMIN-B COMPLEX PO) Take 1 tablet by mouth daily       levothyroxine (SYNTHROID) 88 MCG tablet Take 88 mcg by mouth Daily       ciprofloxacin (CIPRO) 500 MG tablet TAKE 1 TABLET BY MOUTH TWICE DAILY       No current facility-administered medications for this visit. Allergies: Patient has no known allergies.   Past Medical History:   Diagnosis Date    Atrial fibrillation ([facility]     CKD (chronic kidney disease)  Hemodialysis patient ([facility]     History of hip surgery     Hypertension     Hypothyroidism     Lymphedema      Past Surgical History:   Procedure Laterality Date    BACK SURGERY      lumbar    DIALYSIS CATHETER INSERTION N/A 2020    PD CATHETET EXTERNALIZATION performed by Douglas Suarez MD at [address] N/A 2021    LAPAROSCOPIC REPOSTITIONING PD CATHETER performed by Douglas Suarez MD at [address] [place] Left 10/26/2020    CREATION AV ACCESS performed by José Thomas DO at [address] [facility] Left     replacement    IR NONTUNNELED VASCULAR CATHETER Right     LAPAROSCOPY INSERTION PERITONEAL CATHETER N/A 10/28/2020    LAPAROSCOPIC PERITONEAL DIIALYSIS CATHETER PLACEMENT, BURIED performed by Douglas Suarez MD at [facility] History   Problem Relation Age of Onset    Diabetes Mother     Stroke Father     Diabetes Brother     Stroke Paternal Uncle      Social History     Tobacco Use    Smoking status: Former Smoker     Types: Cigarettes     Quit date:      Years since quittin.0    Smokeless tobacco: Never Used   Substance Use Topics    Alcohol use: Never     Frequency: Never       ROS  Eyes  no sudden vision change or amaurosis. Respiratory  no significant shortness of breath,  Cardiovascular  no chest pain or syncope. No  significant leg swelling.  (see HPI)  Musculoskeletal  no gait disturbance  Skin  no new wound. Neurologic   No speech difficulty or lateralizing weakness. All other review of systems are negative. Physical Exam    BP (!) 141/75 (Site: Right Upper Arm)   Pulse 78   Temp 98 °F (36.7 °C)   Resp 16   Ht 5' 10\" (1.778 m)   Wt 280 lb (127 kg)   SpO2 98%   BMI 40.18 kg/m²       Neck- without masses. Cardiovascular  Regular rate and rhythm. Pulmonary  effort appears normal.  No respiratory distress. Lungs - Breath sounds normal. No wheezes or rales. Extremities - Radial and ulnar pulses are 2+ to palpation bilaterally. No cyanosis, clubbing, or significant edema. No signs atheroembolic event. Bruit and thrill thrill present. Well healed surgical scar left AC fossa. Neurologic  alert and oriented X 3. Physiologic. Face symmetric. Skin  warm, dry, and intact. No wounds noted. Psychiatric  mood, affect, and behavior appear normal.  Judgment and thought processes appear normal.       ASSESSMENT/PLAN:      1. ESRD on chronic dialysis      Proceed with Permcath removal   Call with any increased venous pressure, arterial pressure, infiltrations, decreased flow rate, or post procedure bleeding. Let us know if you experience any hand pain or ulcers of that hand   An electronic signature was used to authenticate this note.     --Sabina Castro PA-C

## 2021-02-02 ENCOUNTER — TELEPHONE (OUTPATIENT)
Dept: VASCULAR SURGERY | Age: 61
End: 2021-02-02

## 2021-02-09 ENCOUNTER — TELEPHONE (OUTPATIENT)
Dept: SURGERY | Age: 61
End: 2021-02-09

## 2021-02-12 ENCOUNTER — OFFICE VISIT (OUTPATIENT)
Dept: SURGERY | Age: 61
End: 2021-02-12
Payer: COMMERCIAL

## 2021-02-12 VITALS
TEMPERATURE: 98.3 F | SYSTOLIC BLOOD PRESSURE: 100 MMHG | DIASTOLIC BLOOD PRESSURE: 60 MMHG | WEIGHT: 167 LBS | HEIGHT: 70 IN | BODY MASS INDEX: 23.91 KG/M2

## 2021-02-12 DIAGNOSIS — T85.611A PERITONEAL DIALYSIS CATHETER DYSFUNCTION, INITIAL ENCOUNTER (HCC): ICD-10-CM

## 2021-02-12 DIAGNOSIS — Z99.2 ESRD ON DIALYSIS (HCC): Primary | ICD-10-CM

## 2021-02-12 DIAGNOSIS — N18.6 ESRD ON DIALYSIS (HCC): Primary | ICD-10-CM

## 2021-02-12 PROCEDURE — 99212 OFFICE O/P EST SF 10 MIN: CPT | Performed by: SURGERY

## 2021-02-12 RX ORDER — SEVELAMER HYDROCHLORIDE 800 MG/1
TABLET, FILM COATED ORAL
COMMUNITY
Start: 2021-02-11 | End: 2021-03-05 | Stop reason: ALTCHOICE

## 2021-02-12 RX ORDER — SEVELAMER HYDROCHLORIDE 800 MG/1
2 TABLET, FILM COATED ORAL
COMMUNITY
Start: 2021-02-04 | End: 2021-02-12

## 2021-02-12 RX ORDER — LIDOCAINE AND PRILOCAINE 25; 25 MG/G; MG/G
CREAM TOPICAL PRN
COMMUNITY
Start: 2021-01-28

## 2021-02-12 RX ORDER — CALCIUM CARBONATE 200(500)MG
1 TABLET,CHEWABLE ORAL DAILY
COMMUNITY
Start: 2021-01-13

## 2021-02-12 RX ORDER — CALCITRIOL 0.5 UG/1
0.5 CAPSULE, LIQUID FILLED ORAL DAILY
COMMUNITY
Start: 2021-02-04

## 2021-02-12 RX ORDER — LIDOCAINE AND PRILOCAINE 25; 25 MG/G; MG/G
CREAM TOPICAL
COMMUNITY
Start: 2021-01-28 | End: 2021-02-12

## 2021-02-15 NOTE — PROGRESS NOTES
Subjective:  Mr. Mattie Heredia is a 63year old male, known to me from previous surgery for placement of a PD catheter and repositioning. The patient presents at this time with a complaint of poorly functioning catheter, and not liking how long it takes for a PD treatment. He has decided to go back to HD, and has already switched back. He would like to have his PD catheter removed. Past Medical History[]? Expand by Default           Past Medical History:   Diagnosis Date    Atrial fibrillation (HCC)      CKD (chronic kidney disease)      [facility]od[facility]      History of hip surgery      Hypertension      Hypothyroidism      Lymphedema              Allergies: No Known Allergies     Active Problems:    * No active hospital problems. *  Resolved Problems:    * No resolved hospital problems. *     Blood pressure 138/77, pulse 89, temperature 99.3 °F (37.4 °C), temperature source Tympanic, resp. rate 16, height 5' 10\" (1.778 m), weight 250 lb (113.4 kg), SpO2 98 %.     Review of Systems   Constitutional: Negative for chills and fever. HENT: Negative for sore throat.    Eyes: Negative for pain and redness. Respiratory: Negative for cough and shortness of breath.    Cardiovascular: Negative for chest pain and palpitations. Gastrointestinal: Negative for abdominal distention and abdominal pain. Genitourinary: Negative for dysuria and hematuria. Skin: Negative for rash and wound. Neurological: Negative for dizziness and headaches.         Physical Exam  Vitals signs reviewed. Constitutional:       General: He is not in acute distress. HENT:      Head: Normocephalic and atraumatic. Eyes:      Pupils: Pupils are equal, round, and reactive to light. Cardiovascular:      Rate and Rhythm: Normal rate and regular rhythm. Pulmonary:      Effort: Pulmonary effort is normal. No respiratory distress. Abdominal:      General: There is no distension.      Palpations: Abdomen is soft. Skin:     General: Skin is warm and dry. Neurological:      Mental Status: He is alert.       Assessment and plan:  63year old male with ESRD, with PD catheter in place, wishing for removal  The risks and benefits of catheter removal were discussed, including but not limited to, bleeding, infection, and need for open surgery. The need for preop covid testing was also discussed. The patient expressed understanding and would like to proceed with surgery.

## 2021-02-25 ENCOUNTER — TELEPHONE (OUTPATIENT)
Dept: SURGERY | Age: 61
End: 2021-02-25

## 2021-03-01 ENCOUNTER — OFFICE VISIT (OUTPATIENT)
Age: 61
End: 2021-03-01

## 2021-03-01 VITALS — HEART RATE: 81 BPM | OXYGEN SATURATION: 96 %

## 2021-03-01 DIAGNOSIS — Z11.59 SCREENING FOR VIRAL DISEASE: Primary | ICD-10-CM

## 2021-03-01 LAB — TSH SERPL DL<=0.05 MIU/L-ACNC: 4.04 UIU/ML (ref 0.27–4.2)

## 2021-03-01 PROCEDURE — 99999 PR OFFICE/OUTPT VISIT,PROCEDURE ONLY: CPT | Performed by: NURSE PRACTITIONER

## 2021-03-03 LAB — SARS-COV-2, NAA: NOT DETECTED

## 2021-03-04 ENCOUNTER — PREP FOR PROCEDURE (OUTPATIENT)
Dept: VASCULAR SURGERY | Age: 61
End: 2021-03-04

## 2021-03-04 RX ORDER — ASPIRIN 81 MG/1
81 TABLET ORAL ONCE
Status: CANCELLED | OUTPATIENT
Start: 2021-03-04 | End: 2021-03-04

## 2021-03-05 ENCOUNTER — HOSPITAL ENCOUNTER (OUTPATIENT)
Dept: OTHER | Age: 61
Discharge: HOME | End: 2021-03-05
Payer: COMMERCIAL

## 2021-03-05 VITALS
RESPIRATION RATE: 14 BRPM | SYSTOLIC BLOOD PRESSURE: 141 MMHG | HEART RATE: 77 BPM | BODY MASS INDEX: 37.22 KG/M2 | HEIGHT: 70 IN | TEMPERATURE: 97.5 F | OXYGEN SATURATION: 98 % | WEIGHT: 260 LBS | DIASTOLIC BLOOD PRESSURE: 77 MMHG

## 2021-03-05 PROCEDURE — 2500000003 HC RX 250 WO HCPCS: Performed by: SURGERY

## 2021-03-05 PROCEDURE — 36589 REMOVAL TUNNELED CV CATH: CPT | Performed by: SURGERY

## 2021-03-05 RX ORDER — LIDOCAINE HYDROCHLORIDE 10 MG/ML
30 INJECTION, SOLUTION EPIDURAL; INFILTRATION; INTRACAUDAL; PERINEURAL ONCE
Status: COMPLETED | OUTPATIENT
Start: 2021-03-05 | End: 2021-03-05

## 2021-03-05 RX ORDER — ASPIRIN 81 MG/1
81 TABLET ORAL ONCE
Status: DISCONTINUED | OUTPATIENT
Start: 2021-03-05 | End: 2021-03-05

## 2021-03-05 RX ADMIN — LIDOCAINE HYDROCHLORIDE 30 ML: 10 INJECTION, SOLUTION EPIDURAL; INFILTRATION; INTRACAUDAL; PERINEURAL at 07:45

## 2021-03-05 NOTE — FLOWSHEET NOTE
Perma cath was removed per Dr. Delbert Avalos with assistance of Camilo Henriquez, ROSALIE. Site was clear and manual pressure was applied per Camilo Henriquez RN. Sterile fluffy gauze and silk tape was applied to site. No bleeding noted at present. Pt alert and denies pain.

## 2021-03-05 NOTE — FLOWSHEET NOTE
Pt recovered without bleeding at the site. Discharge instructions were explained to patient with voiced understanding. A copy of instructions was also given to patient.

## 2021-03-08 ENCOUNTER — HOSPITAL ENCOUNTER (OUTPATIENT)
Dept: PREADMISSION TESTING | Age: 61
Discharge: HOME | End: 2021-03-12
Payer: COMMERCIAL

## 2021-03-08 VITALS — BODY MASS INDEX: 37.22 KG/M2 | HEIGHT: 70 IN | WEIGHT: 260 LBS

## 2021-03-08 LAB — SARS-COV-2, PCR: NOT DETECTED

## 2021-03-08 PROCEDURE — U0003 INFECTIOUS AGENT DETECTION BY NUCLEIC ACID (DNA OR RNA); SEVERE ACUTE RESPIRATORY SYNDROME CORONAVIRUS 2 (SARS-COV-2) (CORONAVIRUS DISEASE [COVID-19]), AMPLIFIED PROBE TECHNIQUE, MAKING USE OF HIGH THROUGHPUT TECHNOLOGIES AS DESCRIBED BY CMS-2020-01-R: HCPCS

## 2021-03-08 RX ORDER — SUCROFERRIC OXYHYDROXIDE 500 MG/1
500 TABLET, CHEWABLE ORAL 3 TIMES DAILY
COMMUNITY

## 2021-03-10 ENCOUNTER — HOSPITAL ENCOUNTER (OUTPATIENT)
Age: 61
Setting detail: OUTPATIENT SURGERY
Discharge: HOME | End: 2021-03-10
Attending: SURGERY | Admitting: SURGERY
Payer: COMMERCIAL

## 2021-03-10 ENCOUNTER — PREP FOR PROCEDURE (OUTPATIENT)
Dept: SURGERY | Age: 61
End: 2021-03-10

## 2021-03-10 ENCOUNTER — ANESTHESIA EVENT (OUTPATIENT)
Dept: OPERATING ROOM | Age: 61
End: 2021-03-10
Payer: COMMERCIAL

## 2021-03-10 ENCOUNTER — ANESTHESIA (OUTPATIENT)
Dept: OPERATING ROOM | Age: 61
End: 2021-03-10
Payer: COMMERCIAL

## 2021-03-10 VITALS
OXYGEN SATURATION: 100 % | HEART RATE: 74 BPM | HEIGHT: 70 IN | RESPIRATION RATE: 16 BRPM | BODY MASS INDEX: 37.22 KG/M2 | DIASTOLIC BLOOD PRESSURE: 74 MMHG | WEIGHT: 260 LBS | SYSTOLIC BLOOD PRESSURE: 153 MMHG | TEMPERATURE: 97.3 F

## 2021-03-10 VITALS
DIASTOLIC BLOOD PRESSURE: 52 MMHG | TEMPERATURE: 98.6 F | RESPIRATION RATE: 5 BRPM | SYSTOLIC BLOOD PRESSURE: 109 MMHG | OXYGEN SATURATION: 98 %

## 2021-03-10 DIAGNOSIS — G89.18 POST-OP PAIN: Primary | ICD-10-CM

## 2021-03-10 LAB
ANION GAP SERPL CALCULATED.3IONS-SCNC: 14 MMOL/L (ref 7–19)
BUN BLDV-MCNC: 34 MG/DL (ref 8–23)
CALCIUM SERPL-MCNC: 9.5 MG/DL (ref 8.8–10.2)
CHLORIDE BLD-SCNC: 94 MMOL/L (ref 98–111)
CO2: 30 MMOL/L (ref 22–29)
CREAT SERPL-MCNC: 6.8 MG/DL (ref 0.5–1.2)
GFR AFRICAN AMERICAN: 10
GFR NON-AFRICAN AMERICAN: 8
GLUCOSE BLD-MCNC: 119 MG/DL (ref 74–109)
HCT VFR BLD CALC: 30.5 % (ref 42–52)
HEMOGLOBIN: 9.3 G/DL (ref 14–18)
MCH RBC QN AUTO: 30.1 PG (ref 27–31)
MCHC RBC AUTO-ENTMCNC: 30.5 G/DL (ref 33–37)
MCV RBC AUTO: 98.7 FL (ref 80–94)
PDW BLD-RTO: 15.9 % (ref 11.5–14.5)
PLATELET # BLD: 272 K/UL (ref 130–400)
PMV BLD AUTO: 10.8 FL (ref 9.4–12.4)
POTASSIUM SERPL-SCNC: 4.2 MMOL/L (ref 3.5–4.9)
RBC # BLD: 3.09 M/UL (ref 4.7–6.1)
REASON FOR REJECTION: NORMAL
REJECTED TEST: NORMAL
SODIUM BLD-SCNC: 138 MMOL/L (ref 136–145)
WBC # BLD: 9.4 K/UL (ref 4.8–10.8)

## 2021-03-10 PROCEDURE — 49422 REMOVE TUNNELED IP CATH: CPT | Performed by: SURGERY

## 2021-03-10 PROCEDURE — 2580000003 HC RX 258: Performed by: NURSE ANESTHETIST, CERTIFIED REGISTERED

## 2021-03-10 PROCEDURE — 80048 BASIC METABOLIC PNL TOTAL CA: CPT

## 2021-03-10 PROCEDURE — 2500000003 HC RX 250 WO HCPCS: Performed by: ANESTHESIOLOGY

## 2021-03-10 PROCEDURE — 36415 COLL VENOUS BLD VENIPUNCTURE: CPT

## 2021-03-10 PROCEDURE — 2500000003 HC RX 250 WO HCPCS: Performed by: SURGERY

## 2021-03-10 PROCEDURE — 3600000004 HC SURGERY LEVEL 4 BASE: Performed by: SURGERY

## 2021-03-10 PROCEDURE — 2500000003 HC RX 250 WO HCPCS: Performed by: NURSE ANESTHETIST, CERTIFIED REGISTERED

## 2021-03-10 PROCEDURE — 3700000000 HC ANESTHESIA ATTENDED CARE: Performed by: SURGERY

## 2021-03-10 PROCEDURE — 3700000001 HC ADD 15 MINUTES (ANESTHESIA): Performed by: SURGERY

## 2021-03-10 PROCEDURE — 7100000000 HC PACU RECOVERY - FIRST 15 MIN: Performed by: SURGERY

## 2021-03-10 PROCEDURE — 6360000002 HC RX W HCPCS: Performed by: NURSE ANESTHETIST, CERTIFIED REGISTERED

## 2021-03-10 PROCEDURE — 7100000001 HC PACU RECOVERY - ADDTL 15 MIN: Performed by: SURGERY

## 2021-03-10 PROCEDURE — 2709999900 HC NON-CHARGEABLE SUPPLY: Performed by: SURGERY

## 2021-03-10 PROCEDURE — C2628 CATHETER, OCCLUSION: HCPCS | Performed by: SURGERY

## 2021-03-10 PROCEDURE — 3600000014 HC SURGERY LEVEL 4 ADDTL 15MIN: Performed by: SURGERY

## 2021-03-10 PROCEDURE — 2580000003 HC RX 258: Performed by: SURGERY

## 2021-03-10 PROCEDURE — 7100000011 HC PHASE II RECOVERY - ADDTL 15 MIN: Performed by: SURGERY

## 2021-03-10 PROCEDURE — 6360000002 HC RX W HCPCS: Performed by: ANESTHESIOLOGY

## 2021-03-10 PROCEDURE — 85027 COMPLETE CBC AUTOMATED: CPT

## 2021-03-10 PROCEDURE — 7100000010 HC PHASE II RECOVERY - FIRST 15 MIN: Performed by: SURGERY

## 2021-03-10 RX ORDER — DIPHENHYDRAMINE HYDROCHLORIDE 50 MG/ML
12.5 INJECTION INTRAMUSCULAR; INTRAVENOUS
Status: DISCONTINUED | OUTPATIENT
Start: 2021-03-10 | End: 2021-03-10 | Stop reason: HOSPADM

## 2021-03-10 RX ORDER — PROMETHAZINE HYDROCHLORIDE 25 MG/1
12.5 TABLET ORAL EVERY 6 HOURS PRN
Status: CANCELLED | OUTPATIENT
Start: 2021-03-10

## 2021-03-10 RX ORDER — PROPOFOL 10 MG/ML
INJECTION, EMULSION INTRAVENOUS PRN
Status: DISCONTINUED | OUTPATIENT
Start: 2021-03-10 | End: 2021-03-10 | Stop reason: SDUPTHER

## 2021-03-10 RX ORDER — SODIUM CHLORIDE 450 MG/100ML
INJECTION, SOLUTION INTRAVENOUS ONCE
Status: COMPLETED | OUTPATIENT
Start: 2021-03-10 | End: 2021-03-10

## 2021-03-10 RX ORDER — DEXAMETHASONE SODIUM PHOSPHATE 4 MG/ML
4 INJECTION, SOLUTION INTRA-ARTICULAR; INTRALESIONAL; INTRAMUSCULAR; INTRAVENOUS; SOFT TISSUE ONCE
Status: COMPLETED | OUTPATIENT
Start: 2021-03-10 | End: 2021-03-10

## 2021-03-10 RX ORDER — PROMETHAZINE HYDROCHLORIDE 25 MG/ML
6.25 INJECTION, SOLUTION INTRAMUSCULAR; INTRAVENOUS
Status: DISCONTINUED | OUTPATIENT
Start: 2021-03-10 | End: 2021-03-10 | Stop reason: HOSPADM

## 2021-03-10 RX ORDER — HYDROCODONE BITARTRATE AND ACETAMINOPHEN 5; 325 MG/1; MG/1
2 TABLET ORAL EVERY 4 HOURS PRN
Status: DISCONTINUED | OUTPATIENT
Start: 2021-03-10 | End: 2021-03-10 | Stop reason: HOSPADM

## 2021-03-10 RX ORDER — MIDAZOLAM HYDROCHLORIDE 1 MG/ML
INJECTION INTRAMUSCULAR; INTRAVENOUS PRN
Status: DISCONTINUED | OUTPATIENT
Start: 2021-03-10 | End: 2021-03-10 | Stop reason: SDUPTHER

## 2021-03-10 RX ORDER — FENTANYL CITRATE 50 UG/ML
50 INJECTION, SOLUTION INTRAMUSCULAR; INTRAVENOUS EVERY 5 MIN PRN
Status: DISCONTINUED | OUTPATIENT
Start: 2021-03-10 | End: 2021-03-10 | Stop reason: HOSPADM

## 2021-03-10 RX ORDER — LIDOCAINE HYDROCHLORIDE 10 MG/ML
INJECTION, SOLUTION EPIDURAL; INFILTRATION; INTRACAUDAL; PERINEURAL PRN
Status: DISCONTINUED | OUTPATIENT
Start: 2021-03-10 | End: 2021-03-10 | Stop reason: SDUPTHER

## 2021-03-10 RX ORDER — MEPERIDINE HYDROCHLORIDE 50 MG/ML
12.5 INJECTION INTRAMUSCULAR; INTRAVENOUS; SUBCUTANEOUS EVERY 5 MIN PRN
Status: DISCONTINUED | OUTPATIENT
Start: 2021-03-10 | End: 2021-03-10 | Stop reason: HOSPADM

## 2021-03-10 RX ORDER — MORPHINE SULFATE 4 MG/ML
2 INJECTION, SOLUTION INTRAMUSCULAR; INTRAVENOUS EVERY 5 MIN PRN
Status: DISCONTINUED | OUTPATIENT
Start: 2021-03-10 | End: 2021-03-10 | Stop reason: HOSPADM

## 2021-03-10 RX ORDER — BUPIVACAINE HYDROCHLORIDE AND EPINEPHRINE 2.5; 5 MG/ML; UG/ML
INJECTION, SOLUTION INFILTRATION; PERINEURAL PRN
Status: DISCONTINUED | OUTPATIENT
Start: 2021-03-10 | End: 2021-03-10 | Stop reason: ALTCHOICE

## 2021-03-10 RX ORDER — SODIUM CHLORIDE 0.9 % (FLUSH) 0.9 %
10 SYRINGE (ML) INJECTION EVERY 12 HOURS SCHEDULED
Status: CANCELLED | OUTPATIENT
Start: 2021-03-10

## 2021-03-10 RX ORDER — HYDROCODONE BITARTRATE AND ACETAMINOPHEN 5; 325 MG/1; MG/1
1 TABLET ORAL EVERY 4 HOURS PRN
Status: DISCONTINUED | OUTPATIENT
Start: 2021-03-10 | End: 2021-03-10 | Stop reason: HOSPADM

## 2021-03-10 RX ORDER — ONDANSETRON 2 MG/ML
4 INJECTION INTRAMUSCULAR; INTRAVENOUS
Status: DISCONTINUED | OUTPATIENT
Start: 2021-03-10 | End: 2021-03-10 | Stop reason: HOSPADM

## 2021-03-10 RX ORDER — GLYCOPYRROLATE 0.2 MG/ML
INJECTION INTRAMUSCULAR; INTRAVENOUS PRN
Status: DISCONTINUED | OUTPATIENT
Start: 2021-03-10 | End: 2021-03-10 | Stop reason: SDUPTHER

## 2021-03-10 RX ORDER — SODIUM CHLORIDE 0.9 % (FLUSH) 0.9 %
10 SYRINGE (ML) INJECTION PRN
Status: CANCELLED | OUTPATIENT
Start: 2021-03-10

## 2021-03-10 RX ORDER — HYDROMORPHONE HYDROCHLORIDE 1 MG/ML
0.5 INJECTION, SOLUTION INTRAMUSCULAR; INTRAVENOUS; SUBCUTANEOUS EVERY 5 MIN PRN
Status: DISCONTINUED | OUTPATIENT
Start: 2021-03-10 | End: 2021-03-10 | Stop reason: HOSPADM

## 2021-03-10 RX ORDER — FENTANYL CITRATE 50 UG/ML
INJECTION, SOLUTION INTRAMUSCULAR; INTRAVENOUS PRN
Status: DISCONTINUED | OUTPATIENT
Start: 2021-03-10 | End: 2021-03-10 | Stop reason: SDUPTHER

## 2021-03-10 RX ORDER — HYDROMORPHONE HYDROCHLORIDE 1 MG/ML
0.25 INJECTION, SOLUTION INTRAMUSCULAR; INTRAVENOUS; SUBCUTANEOUS EVERY 5 MIN PRN
Status: DISCONTINUED | OUTPATIENT
Start: 2021-03-10 | End: 2021-03-10 | Stop reason: HOSPADM

## 2021-03-10 RX ORDER — HYDRALAZINE HYDROCHLORIDE 20 MG/ML
5 INJECTION INTRAMUSCULAR; INTRAVENOUS EVERY 10 MIN PRN
Status: DISCONTINUED | OUTPATIENT
Start: 2021-03-10 | End: 2021-03-10 | Stop reason: HOSPADM

## 2021-03-10 RX ORDER — METOCLOPRAMIDE HYDROCHLORIDE 5 MG/ML
10 INJECTION INTRAMUSCULAR; INTRAVENOUS
Status: DISCONTINUED | OUTPATIENT
Start: 2021-03-10 | End: 2021-03-10 | Stop reason: HOSPADM

## 2021-03-10 RX ORDER — SODIUM CHLORIDE 9 MG/ML
INJECTION, SOLUTION INTRAVENOUS CONTINUOUS PRN
Status: DISCONTINUED | OUTPATIENT
Start: 2021-03-10 | End: 2021-03-10 | Stop reason: SDUPTHER

## 2021-03-10 RX ORDER — LABETALOL HYDROCHLORIDE 5 MG/ML
5 INJECTION, SOLUTION INTRAVENOUS EVERY 10 MIN PRN
Status: DISCONTINUED | OUTPATIENT
Start: 2021-03-10 | End: 2021-03-10 | Stop reason: HOSPADM

## 2021-03-10 RX ORDER — MORPHINE SULFATE 4 MG/ML
4 INJECTION, SOLUTION INTRAMUSCULAR; INTRAVENOUS
Status: DISCONTINUED | OUTPATIENT
Start: 2021-03-10 | End: 2021-03-10 | Stop reason: HOSPADM

## 2021-03-10 RX ORDER — SODIUM CHLORIDE 0.9 % (FLUSH) 0.9 %
10 SYRINGE (ML) INJECTION PRN
Status: DISCONTINUED | OUTPATIENT
Start: 2021-03-10 | End: 2021-03-10 | Stop reason: HOSPADM

## 2021-03-10 RX ORDER — ONDANSETRON 2 MG/ML
4 INJECTION INTRAMUSCULAR; INTRAVENOUS EVERY 6 HOURS PRN
Status: CANCELLED | OUTPATIENT
Start: 2021-03-10

## 2021-03-10 RX ORDER — SODIUM CHLORIDE, SODIUM LACTATE, POTASSIUM CHLORIDE, CALCIUM CHLORIDE 600; 310; 30; 20 MG/100ML; MG/100ML; MG/100ML; MG/100ML
INJECTION, SOLUTION INTRAVENOUS CONTINUOUS
Status: DISCONTINUED | OUTPATIENT
Start: 2021-03-10 | End: 2021-03-10 | Stop reason: HOSPADM

## 2021-03-10 RX ORDER — MORPHINE SULFATE 4 MG/ML
2 INJECTION, SOLUTION INTRAMUSCULAR; INTRAVENOUS
Status: DISCONTINUED | OUTPATIENT
Start: 2021-03-10 | End: 2021-03-10 | Stop reason: HOSPADM

## 2021-03-10 RX ORDER — SODIUM CHLORIDE 0.9 % (FLUSH) 0.9 %
10 SYRINGE (ML) INJECTION EVERY 12 HOURS SCHEDULED
Status: DISCONTINUED | OUTPATIENT
Start: 2021-03-10 | End: 2021-03-10 | Stop reason: HOSPADM

## 2021-03-10 RX ORDER — MORPHINE SULFATE 4 MG/ML
4 INJECTION, SOLUTION INTRAMUSCULAR; INTRAVENOUS EVERY 5 MIN PRN
Status: DISCONTINUED | OUTPATIENT
Start: 2021-03-10 | End: 2021-03-10 | Stop reason: HOSPADM

## 2021-03-10 RX ORDER — HYDROCODONE BITARTRATE AND ACETAMINOPHEN 5; 325 MG/1; MG/1
1 TABLET ORAL EVERY 4 HOURS PRN
Qty: 15 TABLET | Refills: 0 | Status: SHIPPED | OUTPATIENT
Start: 2021-03-10 | End: 2021-03-15

## 2021-03-10 RX ADMIN — SODIUM CHLORIDE: 9 INJECTION, SOLUTION INTRAVENOUS at 10:04

## 2021-03-10 RX ADMIN — DEXAMETHASONE SODIUM PHOSPHATE 4 MG: 4 INJECTION, SOLUTION INTRAMUSCULAR; INTRAVENOUS at 08:40

## 2021-03-10 RX ADMIN — GLYCOPYRROLATE 0.2 MG: 0.2 INJECTION, SOLUTION INTRAMUSCULAR; INTRAVENOUS at 10:04

## 2021-03-10 RX ADMIN — SODIUM CHLORIDE: 4.5 INJECTION, SOLUTION INTRAVENOUS at 08:31

## 2021-03-10 RX ADMIN — MIDAZOLAM 2 MG: 1 INJECTION INTRAMUSCULAR; INTRAVENOUS at 10:02

## 2021-03-10 RX ADMIN — LIDOCAINE HYDROCHLORIDE 10 MG: 10 INJECTION, SOLUTION EPIDURAL; INFILTRATION; INTRACAUDAL; PERINEURAL at 10:04

## 2021-03-10 RX ADMIN — FENTANYL CITRATE 50 MCG: 50 INJECTION, SOLUTION INTRAMUSCULAR; INTRAVENOUS at 10:21

## 2021-03-10 RX ADMIN — FENTANYL CITRATE 50 MCG: 50 INJECTION, SOLUTION INTRAMUSCULAR; INTRAVENOUS at 10:19

## 2021-03-10 RX ADMIN — PROPOFOL 200 MG: 10 INJECTION, EMULSION INTRAVENOUS at 10:06

## 2021-03-10 RX ADMIN — FAMOTIDINE 20 MG: 10 INJECTION, SOLUTION INTRAVENOUS at 08:45

## 2021-03-10 ASSESSMENT — PAIN DESCRIPTION - DESCRIPTORS
DESCRIPTORS: SORE
DESCRIPTORS: SORE

## 2021-03-10 ASSESSMENT — PAIN DESCRIPTION - FREQUENCY
FREQUENCY: INTERMITTENT
FREQUENCY: INTERMITTENT

## 2021-03-10 ASSESSMENT — LIFESTYLE VARIABLES: SMOKING_STATUS: 0

## 2021-03-10 ASSESSMENT — PAIN SCALES - GENERAL: PAINLEVEL_OUTOF10: 2

## 2021-03-10 ASSESSMENT — PAIN DESCRIPTION - ONSET
ONSET: AWAKENED FROM SLEEP
ONSET: AWAKENED FROM SLEEP

## 2021-03-10 ASSESSMENT — PAIN DESCRIPTION - PAIN TYPE
TYPE: SURGICAL PAIN
TYPE: SURGICAL PAIN

## 2021-03-10 ASSESSMENT — PAIN DESCRIPTION - LOCATION: LOCATION: ABDOMEN

## 2021-03-10 ASSESSMENT — PAIN - FUNCTIONAL ASSESSMENT: PAIN_FUNCTIONAL_ASSESSMENT: 0-10

## 2021-03-10 NOTE — DISCHARGE INSTR - ACTIVITY
Activity as tolerated except no driving if taking pain medication. Okay to shower over incisions, but do not submerge under water like a tub or pool for one weeks. Watch for redness, drainage, or fever, and call for any questions or concerns. Change dressing over drain exit site daily and as needed.

## 2021-03-10 NOTE — ANESTHESIA POSTPROCEDURE EVALUATION
Department of Anesthesiology  Postprocedure Note    Patient: Cindy Rodriguez  MRN: [id number]  YOB: 1960  Date of evaluation: 3/10/2021  Time:  10:50 AM     Procedure Summary     Date: 03/10/21 Room / Location: [address]    Anesthesia Start: 1004 Anesthesia Stop:     Procedure: PERITONEAL DIALYSIS CATHETER REMOVAL (N/A ) Diagnosis: (DYSFUNCTIONING PERITONEAL DIALYSIS CATHETER)    Surgeons: Leslee Ojeda MD Responsible Provider: CHERYL Mckay    Anesthesia Type: general ASA Status: 3          Anesthesia Type: No value filed. Angelita Phase I: Angelita Score: 10    Angelita Phase II:      Last vitals: Reviewed and per EMR flowsheets.        Anesthesia Post Evaluation    Patient location during evaluation: PACU  Level of consciousness: awake  Pain score: 0  Airway patency: patent  Nausea & Vomiting: no nausea and no vomiting  Complications: no  Cardiovascular status: blood pressure returned to baseline  Respiratory status: acceptable  Hydration status: euvolemic

## 2021-03-10 NOTE — ANESTHESIA PRE PROCEDURE
Department of Anesthesiology  Preprocedure Note       Name:  Domitila Guevara   Age:  63 y.o.  :  1960                                          MRN:  [id number]         Date:  3/10/2021      Surgeon: Gee Khalil):  Darwin Dance, MD    Procedure: Procedure(s):  PERITONEAL DIALYSIS CATHETER REMOVAL    Medications prior to admission:   Prior to Admission medications    Medication Sig Start Date End Date Taking? Authorizing Provider   Sucroferric Oxyhydroxide (VELPHORO) 500 MG CHEW Take 500 mg by mouth 3 times daily    Historical Provider, MD   calcitRIOL (ROCALTROL) 0.5 MCG capsule Take 0.5 mcg by mouth daily  21   Historical Provider, MD   Methoxy PEG-Epoetin Beta (MIRCERA IJ) 30 mcg 21  Historical Provider, MD   calcium carbonate (TUMS) 500 MG chewable tablet Take 1 tablet by mouth daily  21   Historical Provider, MD   lidocaine-prilocaine (EMLA) 2.5-2.5 % cream Apply topically as needed  21   Historical Provider, MD   gentamicin (GARAMYCIN) 0.1 % cream Apply topically as needed  21   Historical Provider, MD   vitamin D (ERGOCALCIFEROL) 1.25 MG (54891 UT) CAPS capsule Take 50,000 Units by mouth once a week    Historical Provider, MD   B Complex Vitamins (VITAMIN-B COMPLEX PO) Take 1 tablet by mouth daily     Historical Provider, MD   levothyroxine (SYNTHROID) 88 MCG tablet Take 88 mcg by mouth Daily     Historical Provider, MD       Current medications:    No current facility-administered medications for this visit. No current outpatient medications on file.      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    CKD (chronic kidney disease) stage V [facility] N18.6, Z99.2    Anemia of chronic renal failure, stage 5 (HCC) N18.5, D63.1    ESRD (end stage renal disease) ([facility] N18.6    Iron deficiency anemia, unspecified D50.9    ESRD on dialysis ([facility] N18.6, Z99.2       Past Medical History:        Diagnosis Date    Atrial fibrillation ([facility]     hx of 20 yr ago    CKD (chronic kidney disease)     Hemodialysis patient ([facility]     T,TH,SAT at [place], [place] History of hip surgery     Hypertension     no meds at present.  Hypothyroidism     Lymphedema        Past Surgical History:        Procedure Laterality Date    BACK SURGERY      lumbar    DIALYSIS CATHETER INSERTION N/A 2020    PD CATHETET EXTERNALIZATION performed by Anna Morales MD at [facility] N/A 2021    LAPAROSCOPIC REPOSTITIONING PD CATHETER performed by Anna Morales MD at [address] Left 10/26/2020    CREATION AV ACCESS performed by Elba Warren DO at [address] [facility] Left     replacement    IR NONTUNNELED VASCULAR CATHETER Right     JOINT REPLACEMENT Left     HIP    LAPAROSCOPY INSERTION PERITONEAL CATHETER N/A 10/28/2020    LAPAROSCOPIC PERITONEAL DIIALYSIS CATHETER PLACEMENT, BURIED performed by Anna Morales MD at [place] [address] History:    Social History     Tobacco Use    Smoking status: Former Smoker     Types: Cigarettes     Quit date:      Years since quittin.1    Smokeless tobacco: Never Used   Substance Use Topics    Alcohol use: Never     Frequency: Never                                Counseling given: Not Answered      Vital Signs (Current): There were no vitals filed for this visit.                                            BP Readings from Last 3 Encounters:   21 (!) 141/77   21 100/60   21 (!) 141/75       NPO Status:                                                                                 BMI:   Wt Readings from Last 3 Encounters:   03/08/21 260 lb (117.9 kg)   03/05/21 260 lb (117.9 kg)   02/12/21 167 lb (75.8 kg)     There is no height or weight on file to calculate BMI.    CBC:   Lab Results   Component Value Date    WBC 11.1 01/06/2021    RBC 4.00 01/06/2021    HGB 11.8 01/06/2021    HCT 38.0 01/06/2021    MCV 95.0 01/06/2021    RDW 16.2 01/06/2021     01/06/2021       CMP:   Lab Results   Component Value Date     01/06/2021    K 5.4 01/06/2021    CL 94 01/06/2021    CO2 27 01/06/2021    BUN 58 01/06/2021    CREATININE 8.5 01/06/2021    GFRAA 8 01/06/2021    LABGLOM 6 01/06/2021    GLUCOSE 117 01/06/2021    PROT 5.6 10/17/2019    CALCIUM 9.2 01/06/2021    BILITOT 0.3 10/17/2019    ALKPHOS 66 10/17/2019    AST 13 10/17/2019    ALT 7 10/17/2019       POC Tests: No results for input(s): POCGLU, POCNA, POCK, POCCL, POCBUN, POCHEMO, POCHCT in the last 72 hours.     Coags:   Lab Results   Component Value Date    PROTIME 14.5 10/28/2020    INR 1.14 10/28/2020    APTT 29.1 10/28/2020       HCG (If Applicable): No results found for: PREGTESTUR, PREGSERUM, HCG, HCGQUANT     ABGs: No results found for: PHART, PO2ART, QCM1LGV, IHZ3NPJ, BEART, Z5WDCIIK     Type & Screen (If Applicable):  No results found for: LABABO, LABRH    Drug/Infectious Status (If Applicable):  No results found for: HIV, HEPCAB    COVID-19 Screening (If Applicable):   Lab Results   Component Value Date    COVID19 Not Detected 03/08/2021         Anesthesia Evaluation  Patient summary reviewed no history of anesthetic complications:   Airway: Mallampati: III  TM distance: >3 FB   Neck ROM: full  Mouth opening: > = 3 FB Dental:    (+) edentulous      Pulmonary:normal exam  breath sounds clear to auscultation      (-) asthma, recent URI, sleep apnea and not a current smoker                           Cardiovascular:  Exercise tolerance: good (>4 METS),   (+) dysrhythmias (20 yrs ago, no issues since): atrial fibrillation,     (-) pacemaker, hypertension (None since starting dialysis), past MI, CABG/stent and  angina    ECG reviewed  Rhythm: regular  Rate: normal           Beta Blocker:  Not on Beta Blocker         Neuro/Psych:      (-) seizures, TIA and CVA           GI/Hepatic/Renal:   (+) renal disease (Dialysis 3/9/21): ESRD,      (-) GERD and liver disease       Endo/Other:    (+) hypothyroidism::., .    (-) diabetes mellitus, hyperthyroidism               Abdominal:           Vascular:                                          Anesthesia Plan      general     ASA 3     (Preop famotidine, dexamethasone)  Induction: intravenous. MIPS: Postoperative opioids intended and Prophylactic antiemetics administered. Anesthetic plan and risks discussed with patient. Use of blood products discussed with patient whom consented to blood products. Plan discussed with CRNA.                   Mirlande Ingram MD   3/10/2021

## 2021-03-10 NOTE — H&P (VIEW-ONLY)
Subjective:  Mr. Colleen Pitts is a 63year old male, known to me from previous surgery for placement of a PD catheter and repositioning. The patient presents at this time with a complaint of poorly functioning catheter, and not liking how long it takes for a PD treatment. He has decided to go back to HD, and has already switched back. He would like to have his PD catheter removed.     Past Medical History[]? ? Expand by Default           Past Medical History:   Diagnosis Date    Atrial fibrillation (HCC)      CKD (chronic kidney disease)      [facility]      History of hip surgery      Hypertension      Hypothyroidism      Lymphedema              Allergies: No Known Allergies     Active Problems:    * No active hospital problems. *  Resolved Problems:    * No resolved hospital problems. *     Blood pressure 138/77, pulse 89, temperature 99.3 °F (37.4 °C), temperature source Tympanic, resp. rate 16, height 5' 10\" (1.778 m), weight 250 lb (113.4 kg), SpO2 98 %.     Review of Systems   Constitutional: Negative for chills and fever. HENT: Negative for sore throat.    Eyes: Negative for pain and redness. Respiratory: Negative for cough and shortness of breath.    Cardiovascular: Negative for chest pain and palpitations. Gastrointestinal: Negative for abdominal distention and abdominal pain. Genitourinary: Negative for dysuria and hematuria. Skin: Negative for rash and wound. Neurological: Negative for dizziness and headaches.         Physical Exam  Vitals signs reviewed. Constitutional:       General: He is not in acute distress. HENT:      Head: Normocephalic and atraumatic. Eyes:      Pupils: Pupils are equal, round, and reactive to light. Cardiovascular:      Rate and Rhythm: Normal rate and regular rhythm. Pulmonary:      Effort: Pulmonary effort is normal. No respiratory distress. Abdominal:      General: There is no distension.      Palpations: Abdomen is soft.    Skin:    General: Skin is warm and dry. Neurological:      Mental Status: He is alert.       Assessment and plan:  63year old male with ESRD, with PD catheter in place, wishing for removal  The risks and benefits of catheter removal were discussed, including but not limited to, bleeding, infection, and need for open surgery. The need for preop covid testing was also discussed. The patient expressed understanding and would like to proceed with surgery.

## 2021-03-10 NOTE — BRIEF OP NOTE
Brief Postoperative Note      Patient: Aracelis Barber  YOB: 1960  MRN: [id number]    Date of Procedure: 3/10/2021    Pre-Op Diagnosis: DYSFUNCTIONING PERITONEAL DIALYSIS CATHETER    Post-Op Diagnosis: Same       Procedure(s):  PERITONEAL DIALYSIS CATHETER REMOVAL    Surgeon(s):  Vivienne Montez MD    Assistant:  * No surgical staff found *    Anesthesia: General    Estimated Blood Loss (mL): Minimal    Complications: None    Specimens:   * No specimens in log *    Implants:  * No implants in log *      Drains: * No LDAs found *    Findings: no acute findings, both cuffs removed without difficulty    Electronically signed by Vivienne Montez MD on 3/10/2021 at 10:39 AM

## 2021-03-10 NOTE — H&P
Subjective:  Mr. Shara Messer is a 63year old male, known to me from previous surgery for placement of a PD catheter and repositioning. The patient presents at this time with a complaint of poorly functioning catheter, and not liking how long it takes for a PD treatment. He has decided to go back to HD, and has already switched back. He would like to have his PD catheter removed.     Past Medical History[]? ? Expand by Default           Past Medical History:   Diagnosis Date    Atrial fibrillation (HCC)      CKD (chronic kidney disease)      Hemod[facility]      History of hip surgery      Hypertension      Hypothyroidism      Lymphedema              Allergies: No Known Allergies     Active Problems:    * No active hospital problems. *  Resolved Problems:    * No resolved hospital problems. *     Blood pressure 138/77, pulse 89, temperature 99.3 °F (37.4 °C), temperature source Tympanic, resp. rate 16, height 5' 10\" (1.778 m), weight 250 lb (113.4 kg), SpO2 98 %.     Review of Systems   Constitutional: Negative for chills and fever. HENT: Negative for sore throat.    Eyes: Negative for pain and redness. Respiratory: Negative for cough and shortness of breath.    Cardiovascular: Negative for chest pain and palpitations. Gastrointestinal: Negative for abdominal distention and abdominal pain. Genitourinary: Negative for dysuria and hematuria. Skin: Negative for rash and wound. Neurological: Negative for dizziness and headaches.         Physical Exam  Vitals signs reviewed. Constitutional:       General: He is not in acute distress. HENT:      Head: Normocephalic and atraumatic. Eyes:      Pupils: Pupils are equal, round, and reactive to light. Cardiovascular:      Rate and Rhythm: Normal rate and regular rhythm. Pulmonary:      Effort: Pulmonary effort is normal. No respiratory distress. Abdominal:      General: There is no distension.      Palpations: Abdomen is soft. Skin:     General: Skin is warm and dry. Neurological:      Mental Status: He is alert.       Assessment and plan:  63year old male with ESRD, with PD catheter in place, wishing for removal  The risks and benefits of catheter removal were discussed, including but not limited to, bleeding, infection, and need for open surgery. The need for preop covid testing was also discussed. The patient expressed understanding and would like to proceed with surgery.

## 2021-03-10 NOTE — DISCHARGE INSTR - DIET

## 2021-03-10 NOTE — INTERVAL H&P NOTE
Update History & Physical    The patient's History and Physical of February 12, 2021 was reviewed with the patient and I examined the patient. There was no change. The surgical site was confirmed by the patient and me. Plan: The risks, benefits, expected outcome, and alternative to the recommended procedure have been discussed with the patient. Patient understands and wants to proceed with the procedure.      Electronically signed by Kong Waddell MD on 3/10/2021 at 9:50 AM

## 2021-03-16 NOTE — OP NOTE
Operative Report    PATIENT NAME: Wilman Daniel  MEDICAL RECORD NO. [id number]  SURGEON: Leonides Izquierdo MD   Primary Care Physician: Ilan Meeks MD  Date: 3/10/2021   PROCEDURE PERFORMED: PD catheter removal  PREOPERATIVE DIAGNOSIS: dysfunctional PD catheter  POSTOPERATIVE DIAGNOSIS: Same  SURGEON:  Leonides Izquierdo MD   ANESTHESIA:  General  ESTIMATED BLOOD LOSS: minimal  SPECIMEN:  none  COMPLICATIONS:  None; patient tolerated the procedure well. FINDINGS: catheter removed without difficulty with both cuffs intact   DISPOSITION: PACU - hemodynamically stable. CONDITION: stable      Indications: the patient is a 63year old male with ESRD. He previously underwent PD catheter placement and externalization as well as repositioning. The patient never got very good exchanges, and decided he would rather have HD three times a week. He presents at this time for PD catheter removal.    Procedure Details     After the risks and benefits of the procedure were explained, informed consent was obtained, and the patient was taken to the operating room. The patient was placed in supine position and anesthesia was begun. The abdomen was prepped and draped in normal sterile fashion. A time out was performed. Local anesthetic was injected at the LLQ scar from catheter insertion in to the abdominal cavity. Soft tissue was bluntly dissected and the catheter was grasped and elevated with a hemostat. The cuff at the abdominal wall was identified and was dissected circumferentially, allowing the catheter to begin withdrawal from the abdominal cavity. A 2-0 PDS figure of eight stitch was placed around the catheter exit site in the abdominal wall and the catheter was withdrawn as the stitch was tightened. The tunnel along the catheter was bluntly dissected and the second cuff was visualized, and dissected circumferentially, allowing the catheter to be completely freed, it was cut at the RLQ to allow it to be pulled through.  The LLQ incision was closed in layers with vicryl stitches, and the skin was closed with 4-0 monocryl subcuticular stitches, and dermabond was applied. The drain exit site was covered with a dressing. The patient tolerated the procedure well, was removed from anesthesia, and transferred to the recovery area in stable condition.                   Herminia Pascual MD   Electronically signed 03/16/21 9:38 AM

## 2021-03-26 ENCOUNTER — OFFICE VISIT (OUTPATIENT)
Dept: SURGERY | Age: 61
End: 2021-03-26

## 2021-03-26 VITALS
HEART RATE: 83 BPM | TEMPERATURE: 98.2 F | BODY MASS INDEX: 38.51 KG/M2 | OXYGEN SATURATION: 98 % | HEIGHT: 70 IN | WEIGHT: 269 LBS

## 2021-03-26 DIAGNOSIS — N18.6 ESRD ON DIALYSIS (HCC): Primary | ICD-10-CM

## 2021-03-26 DIAGNOSIS — Z99.2 ESRD ON DIALYSIS (HCC): Primary | ICD-10-CM

## 2021-03-26 PROCEDURE — 99024 POSTOP FOLLOW-UP VISIT: CPT | Performed by: SURGERY

## 2021-03-30 NOTE — PROGRESS NOTES
Subjective:  Mr. Guadalupe Lesch is here to follow up after removal of PD catheter. He reports that he has been doing very well since surgery. He denies any issues with pain, and denies any issues with his incisions. He is continuing on HD and is feeling much better. Objective:  Pulse 83   Temp 98.2 °F (36.8 °C) (Temporal)   Ht 5' 10\" (1.778 m)   Wt 269 lb (122 kg)   SpO2 98%   BMI 38.60 kg/m²   General: NAD, AAO  Chest: regular, non-labored  Abdomen: Soft, ND, NT, incisions healing well, no erythema or exudate    Assessment and plan:  63year old male with ESRD s/p removal of PD catheter  1) Doing well. Continue diet and activity as tolerated  2) Follow up in general surgery as needed and call for any questions or concerns.

## 2022-08-09 ENCOUNTER — HOSPITAL ENCOUNTER (OUTPATIENT)
Dept: LAB | Age: 62
Discharge: HOME | End: 2022-08-09
Payer: COMMERCIAL

## 2022-08-09 LAB
ALBUMIN SERPL-MCNC: 4.8 G/DL (ref 3.5–5.2)
ALP BLD-CCNC: 78 U/L (ref 40–130)
ALT SERPL-CCNC: 10 U/L (ref 5–41)
ANION GAP SERPL CALCULATED.3IONS-SCNC: 12 MMOL/L (ref 7–19)
AST SERPL-CCNC: 12 U/L (ref 5–40)
BASOPHILS ABSOLUTE: 0.1 K/UL (ref 0–0.2)
BASOPHILS RELATIVE PERCENT: 0.9 % (ref 0–1)
BILIRUB SERPL-MCNC: 0.5 MG/DL (ref 0.2–1.2)
BUN BLDV-MCNC: 19 MG/DL (ref 8–23)
CALCIUM SERPL-MCNC: 9.8 MG/DL (ref 8.8–10.2)
CHLORIDE BLD-SCNC: 93 MMOL/L (ref 98–111)
CO2: 34 MMOL/L (ref 22–29)
CREAT SERPL-MCNC: 4.5 MG/DL (ref 0.5–1.2)
EOSINOPHILS ABSOLUTE: 0.4 K/UL (ref 0–0.6)
EOSINOPHILS RELATIVE PERCENT: 4 % (ref 0–5)
GFR AFRICAN AMERICAN: 16
GFR NON-AFRICAN AMERICAN: 13
GLUCOSE BLD-MCNC: 103 MG/DL (ref 74–109)
HCT VFR BLD CALC: 33.4 % (ref 42–52)
HEMOGLOBIN: 10.5 G/DL (ref 14–18)
IMMATURE GRANULOCYTES #: 0 K/UL
INR BLD: 0.98 (ref 0.88–1.18)
LYMPHOCYTES ABSOLUTE: 1.4 K/UL (ref 1.1–4.5)
LYMPHOCYTES RELATIVE PERCENT: 15.7 % (ref 20–40)
MCH RBC QN AUTO: 31.5 PG (ref 27–31)
MCHC RBC AUTO-ENTMCNC: 31.4 G/DL (ref 33–37)
MCV RBC AUTO: 100.3 FL (ref 80–94)
MONOCYTES ABSOLUTE: 0.5 K/UL (ref 0–0.9)
MONOCYTES RELATIVE PERCENT: 5 % (ref 0–10)
NEUTROPHILS ABSOLUTE: 6.7 K/UL (ref 1.5–7.5)
NEUTROPHILS RELATIVE PERCENT: 74.2 % (ref 50–65)
PDW BLD-RTO: 13.7 % (ref 11.5–14.5)
PLATELET # BLD: 251 K/UL (ref 130–400)
PMV BLD AUTO: 10.1 FL (ref 9.4–12.4)
POTASSIUM SERPL-SCNC: 3.9 MMOL/L (ref 3.5–5)
PROTHROMBIN TIME: 12.9 SEC (ref 12–14.6)
RBC # BLD: 3.33 M/UL (ref 4.7–6.1)
SODIUM BLD-SCNC: 139 MMOL/L (ref 136–145)
TOTAL PROTEIN: 8.3 G/DL (ref 6.6–8.7)
WBC # BLD: 9 K/UL (ref 4.8–10.8)

## 2022-08-09 PROCEDURE — 85025 COMPLETE CBC W/AUTO DIFF WBC: CPT

## 2022-08-09 PROCEDURE — 36415 COLL VENOUS BLD VENIPUNCTURE: CPT

## 2022-08-09 PROCEDURE — 85610 PROTHROMBIN TIME: CPT

## 2022-08-09 PROCEDURE — 80053 COMPREHEN METABOLIC PANEL: CPT

## 2023-09-14 ENCOUNTER — OFFICE VISIT (OUTPATIENT)
Dept: GASTROENTEROLOGY | Facility: CLINIC | Age: 63
End: 2023-09-14
Payer: MEDICARE

## 2023-09-14 VITALS
OXYGEN SATURATION: 96 % | WEIGHT: 210 LBS | SYSTOLIC BLOOD PRESSURE: 108 MMHG | HEIGHT: 70 IN | BODY MASS INDEX: 30.06 KG/M2 | DIASTOLIC BLOOD PRESSURE: 64 MMHG | TEMPERATURE: 98 F | HEART RATE: 80 BPM

## 2023-09-14 DIAGNOSIS — K63.5 SERRATED POLYP OF COLON: Primary | ICD-10-CM

## 2023-09-14 RX ORDER — FOLIC ACID/VIT B COMPLEX AND C 0.8 MG
1 TABLET ORAL DAILY
COMMUNITY
Start: 2023-08-09

## 2023-09-14 RX ORDER — FAMOTIDINE 20 MG
25 TABLET ORAL EVERY OTHER DAY
COMMUNITY
Start: 2023-05-24 | End: 2024-05-22

## 2023-09-14 RX ORDER — AMLODIPINE BESYLATE 10 MG/1
10 TABLET ORAL DAILY
COMMUNITY
Start: 2023-08-14

## 2023-09-14 RX ORDER — SEMAGLUTIDE 2.4 MG/.75ML
0.75 INJECTION, SOLUTION SUBCUTANEOUS WEEKLY
COMMUNITY
Start: 2023-09-11

## 2023-09-14 NOTE — H&P (VIEW-ONLY)
Primary Physician: Cole Hall MD    Chief Complaint   Patient presents with    Colon Cancer Screening     Pt presents today for evaluation for colonoscopy-pt's last colon was 7/31/2020; Personal history of adenomatous and hyperplastic polyps       Subjective     Beni Fuchs is a 62 y.o. male.    HPI  Personal history of adenomatous colon polyps  Last colonoscopy 7/31/2020 with a 7 mm polyp of the ascending colon at the ink site, diverticulosis of the left colon otherwise exam unremarkable.  No family history of colon cancer to report.  Patient denies any change in bowel habits.  No diarrhea, constipation, abdominal pain or rectal bleeding.  He is on dialysis for chronic kidney disease.  Takes dialysis 3 times a week.  He is on kidney transplant list and is hopeful to get a transplant before the end of the year according to the patient.      Past Medical History:   Diagnosis Date    A-fib     Cellulitis of both lower extremities 2018    Chronic kidney disease 05/2019    Dialysis patient     Diverticulosis     Elevated cholesterol     History of adenomatous polyp of colon     History of colon polyps     Hypertension     Idiopathic membranous glomerular disease 2019    was treated acutely Nov. 20th, 2019 [facility] Dr.Praveen Kamara    Neuropathy        Past Surgical History:   Procedure Laterality Date    BACK SURGERY Right 2011    L1-5 fusion    CARDIAC CATHETERIZATION      COLONOSCOPY N/A 12/31/2019    One 2mm hyperplastic polyp in the cecum; One 16mm sessile serrated adenomatous polyp in the ascending colon-Tattooed; Diverticulosis in the left colon; Repeat 6 months    COLONOSCOPY N/A 07/31/2020    Diverticulosis in the left colon; A tattoo was seen in the ascending colon-A post-polypectomy scar was found at the tattoo site; One 7mm sessile serrated adenomatous polyp in the ascending colon; Repeat 3 years    PERITONEAL CATHETER INSERTION      PERITONEAL CATHETER REMOVAL      TOTAL HIP  "ARTHROPLASTY Left         Current Outpatient Medications:     amLODIPine (NORVASC) 10 MG tablet, Take 1 tablet by mouth Daily., Disp: , Rfl:     b complex-vitamin c-folic acid (NEPHRO-ANTELMO) 0.8 MG tablet tablet, Take 1 tablet by mouth Daily., Disp: , Rfl:     Vitamin D, Cholecalciferol, 25 MCG (1000 UT) capsule, Take 1 capsule by mouth Every Other Day., Disp: , Rfl:     Wegovy 2.4 MG/0.75ML solution auto-injector, Inject 0.75 mL under the skin into the appropriate area as directed 1 (One) Time Per Week., Disp: , Rfl:     polyethylene glycol (GoLYTELY) 236 g solution, Take as directed split dose prep, Disp: 4000 mL, Rfl: 0    Allergies   Allergen Reactions    Iodine Itching and Rash       Social History     Socioeconomic History    Marital status: Single   Tobacco Use    Smoking status: Former     Packs/day: 1.00     Years: 40.00     Pack years: 40.00     Types: Cigarettes    Smokeless tobacco: Never   Vaping Use    Vaping Use: Never used   Substance and Sexual Activity    Alcohol use: No    Drug use: Never    Sexual activity: Not Currently       Family History   Problem Relation Age of Onset    Colon cancer Neg Hx     Colon polyps Neg Hx     Esophageal cancer Neg Hx     Liver cancer Neg Hx     Liver disease Neg Hx     Rectal cancer Neg Hx     Stomach cancer Neg Hx        Review of Systems   Constitutional:  Negative for unexpected weight change.   Respiratory:  Negative for shortness of breath.    Cardiovascular:  Negative for chest pain.     Objective     /64 (BP Location: Right arm, Patient Position: Sitting, Cuff Size: Adult)   Pulse 80   Temp 98 °F (36.7 °C) (Infrared)   Ht 177.8 cm (70\")   Wt 95.3 kg (210 lb)   SpO2 96%   BMI 30.13 kg/m²     Physical Exam  Vitals reviewed.   Constitutional:       Appearance: Normal appearance.   Cardiovascular:      Rate and Rhythm: Normal rate and regular rhythm.      Heart sounds: Normal heart sounds.   Pulmonary:      Effort: Pulmonary effort is normal.      " Breath sounds: Normal breath sounds.   Neurological:      Mental Status: He is alert.       Lab Results - Last 18 Months   Lab Units 08/09/22  1043   GLUCOSE mg/dL 103   BUN mg/dL 19   CREATININE mg/dL 4.5*   SODIUM mmol/L 139   POTASSIUM mmol/L 3.9   CHLORIDE mmol/L 93*   TOTAL CO2 mmol/L 34*   TOTAL PROTEIN g/dL 8.3   ALBUMIN g/dL 4.8   ALT (SGPT) U/L 10   AST (SGOT) U/L 12   ALK PHOS U/L 78   BILIRUBIN mg/dL 0.5       Lab Results - Last 18 Months   Lab Units 08/08/23  0510 07/28/23  1409 08/15/22  0838 08/09/22  1043   HEMOGLOBIN g/dL  --  10.2*  --  10.5*   HEMATOCRIT %  --  30.7*  --  33.4*   MCV fL  --  97  --  100.3*   WBC x10E3/uL  --  7.3  --  9.0   RDW %  --  13.1  --  13.7   MPV fL  --   --   --  10.1   PLATELETS K/uL  --   --   --  251   EXTERNAL PLATELETS x10E3/uL  --  247  --   --    INR  1.1  --    < > 0.98    < > = values in this interval not displayed.       Lab Results - Last 18 Months   Lab Units 09/26/22  1322   TSH uIU/mL 4.505          IMPRESSION/PLAN:    Assessment & Plan      Problem List Items Addressed This Visit          Gastrointestinal Abdominal     Serrated polyp of colon - Primary    Overview     Large polyp in ascending colon removed in piecemeal fashion and marked with ink, adenomatous.  Most recent colonoscopy July 2020 with a 7 mm polyp of the ascending colon.         Relevant Medications    polyethylene glycol (GoLYTELY) 236 g solution    Other Relevant Orders    Case Request (Completed)     Colonoscopy per Dr. Caden Kaplan Prep  Discussed with pt for him to talk to his nephrologist regarding upcoming Golytely Prep and if anything regarding dialysis needs to be adjusted.          ..The risks, benefits, and alternatives of colonoscopy were reviewed with the patient today.  Risks including perforation of the colon possibly requiring surgery or colostomy.  Additional risks include risk of bleeding from biopsies or removal of colon tissue.  There is also the risk of a drug reaction  or problems with anesthesia.  This will be discussed with the further by the anesthesia team on the day of the procedure.  Lastly there is a possibility of missing a colon polyp or cancer.  The benefits include the diagnosis and management of disease of the colon and rectum.  Alternatives to colonoscopy include barium enema, laboratory testing, radiographic evaluation, or no intervention.  The patient verbalizes understanding and agrees.    In accordance with requirements under the Affordable Care Act, [facility] has provided pricing for all hospital services and items on each of its websites. However, a patient's actual cost may differ based on the services the patient receives to meet individual healthcare needs and based on the benefits provided under the patient’s insurance coverage.        Jud Nichols, APRN  09/14/23  08:46 CDT    Part of this note may be an electronic transcription/translation of spoken language to printed text.

## 2023-09-14 NOTE — PROGRESS NOTES
Primary Physician: Cole Hall MD    Chief Complaint   Patient presents with    Colon Cancer Screening     Pt presents today for evaluation for colonoscopy-pt's last colon was 7/31/2020; Personal history of adenomatous and hyperplastic polyps       Subjective     Beni Fuchs is a 62 y.o. male.    HPI  Personal history of adenomatous colon polyps  Last colonoscopy 7/31/2020 with a 7 mm polyp of the ascending colon at the ink site, diverticulosis of the left colon otherwise exam unremarkable.  No family history of colon cancer to report.  Patient denies any change in bowel habits.  No diarrhea, constipation, abdominal pain or rectal bleeding.  He is on dialysis for chronic kidney disease.  Takes dialysis 3 times a week.  He is on kidney transplant list and is hopeful to get a transplant before the end of the year according to the patient.      Past Medical History:   Diagnosis Date    A-fib     Cellulitis of both lower extremities 2018    Chronic kidney disease 05/2019    Dialysis patient     Diverticulosis     Elevated cholesterol     History of adenomatous polyp of colon     History of colon polyps     Hypertension     Idiopathic membranous glomerular disease 2019    was treated acutely Nov. 20th, 2019 [facility] Dr.Praveen Kamara    Neuropathy        Past Surgical History:   Procedure Laterality Date    BACK SURGERY Right 2011    L1-5 fusion    CARDIAC CATHETERIZATION      COLONOSCOPY N/A 12/31/2019    One 2mm hyperplastic polyp in the cecum; One 16mm sessile serrated adenomatous polyp in the ascending colon-Tattooed; Diverticulosis in the left colon; Repeat 6 months    COLONOSCOPY N/A 07/31/2020    Diverticulosis in the left colon; A tattoo was seen in the ascending colon-A post-polypectomy scar was found at the tattoo site; One 7mm sessile serrated adenomatous polyp in the ascending colon; Repeat 3 years    PERITONEAL CATHETER INSERTION      PERITONEAL CATHETER REMOVAL      TOTAL HIP  "ARTHROPLASTY Left         Current Outpatient Medications:     amLODIPine (NORVASC) 10 MG tablet, Take 1 tablet by mouth Daily., Disp: , Rfl:     b complex-vitamin c-folic acid (NEPHRO-ANTELMO) 0.8 MG tablet tablet, Take 1 tablet by mouth Daily., Disp: , Rfl:     Vitamin D, Cholecalciferol, 25 MCG (1000 UT) capsule, Take 1 capsule by mouth Every Other Day., Disp: , Rfl:     Wegovy 2.4 MG/0.75ML solution auto-injector, Inject 0.75 mL under the skin into the appropriate area as directed 1 (One) Time Per Week., Disp: , Rfl:     polyethylene glycol (GoLYTELY) 236 g solution, Take as directed split dose prep, Disp: 4000 mL, Rfl: 0    Allergies   Allergen Reactions    Iodine Itching and Rash       Social History     Socioeconomic History    Marital status: Single   Tobacco Use    Smoking status: Former     Packs/day: 1.00     Years: 40.00     Pack years: 40.00     Types: Cigarettes    Smokeless tobacco: Never   Vaping Use    Vaping Use: Never used   Substance and Sexual Activity    Alcohol use: No    Drug use: Never    Sexual activity: Not Currently       Family History   Problem Relation Age of Onset    Colon cancer Neg Hx     Colon polyps Neg Hx     Esophageal cancer Neg Hx     Liver cancer Neg Hx     Liver disease Neg Hx     Rectal cancer Neg Hx     Stomach cancer Neg Hx        Review of Systems   Constitutional:  Negative for unexpected weight change.   Respiratory:  Negative for shortness of breath.    Cardiovascular:  Negative for chest pain.     Objective     /64 (BP Location: Right arm, Patient Position: Sitting, Cuff Size: Adult)   Pulse 80   Temp 98 °F (36.7 °C) (Infrared)   Ht 177.8 cm (70\")   Wt 95.3 kg (210 lb)   SpO2 96%   BMI 30.13 kg/m²     Physical Exam  Vitals reviewed.   Constitutional:       Appearance: Normal appearance.   Cardiovascular:      Rate and Rhythm: Normal rate and regular rhythm.      Heart sounds: Normal heart sounds.   Pulmonary:      Effort: Pulmonary effort is normal.      " Breath sounds: Normal breath sounds.   Neurological:      Mental Status: He is alert.       Lab Results - Last 18 Months   Lab Units 08/09/22  1043   GLUCOSE mg/dL 103   BUN mg/dL 19   CREATININE mg/dL 4.5*   SODIUM mmol/L 139   POTASSIUM mmol/L 3.9   CHLORIDE mmol/L 93*   TOTAL CO2 mmol/L 34*   TOTAL PROTEIN g/dL 8.3   ALBUMIN g/dL 4.8   ALT (SGPT) U/L 10   AST (SGOT) U/L 12   ALK PHOS U/L 78   BILIRUBIN mg/dL 0.5       Lab Results - Last 18 Months   Lab Units 08/08/23  0510 07/28/23  1409 08/15/22  0838 08/09/22  1043   HEMOGLOBIN g/dL  --  10.2*  --  10.5*   HEMATOCRIT %  --  30.7*  --  33.4*   MCV fL  --  97  --  100.3*   WBC x10E3/uL  --  7.3  --  9.0   RDW %  --  13.1  --  13.7   MPV fL  --   --   --  10.1   PLATELETS K/uL  --   --   --  251   EXTERNAL PLATELETS x10E3/uL  --  247  --   --    INR  1.1  --    < > 0.98    < > = values in this interval not displayed.       Lab Results - Last 18 Months   Lab Units 09/26/22  1322   TSH uIU/mL 4.505          IMPRESSION/PLAN:    Assessment & Plan      Problem List Items Addressed This Visit          Gastrointestinal Abdominal     Serrated polyp of colon - Primary    Overview     Large polyp in ascending colon removed in piecemeal fashion and marked with ink, adenomatous.  Most recent colonoscopy July 2020 with a 7 mm polyp of the ascending colon.         Relevant Medications    polyethylene glycol (GoLYTELY) 236 g solution    Other Relevant Orders    Case Request (Completed)     Colonoscopy per Dr. Caden Kaplan Prep  Discussed with pt for him to talk to his nephrologist regarding upcoming Golytely Prep and if anything regarding dialysis needs to be adjusted.          ..The risks, benefits, and alternatives of colonoscopy were reviewed with the patient today.  Risks including perforation of the colon possibly requiring surgery or colostomy.  Additional risks include risk of bleeding from biopsies or removal of colon tissue.  There is also the risk of a drug reaction  or problems with anesthesia.  This will be discussed with the further by the anesthesia team on the day of the procedure.  Lastly there is a possibility of missing a colon polyp or cancer.  The benefits include the diagnosis and management of disease of the colon and rectum.  Alternatives to colonoscopy include barium enema, laboratory testing, radiographic evaluation, or no intervention.  The patient verbalizes understanding and agrees.    In accordance with requirements under the Affordable Care Act, [facility] has provided pricing for all hospital services and items on each of its websites. However, a patient's actual cost may differ based on the services the patient receives to meet individual healthcare needs and based on the benefits provided under the patient’s insurance coverage.        Jud Nichols, APRN  09/14/23  08:46 CDT    Part of this note may be an electronic transcription/translation of spoken language to printed text.

## 2023-09-20 ENCOUNTER — ANESTHESIA (OUTPATIENT)
Dept: GASTROENTEROLOGY | Facility: HOSPITAL | Age: 63
End: 2023-09-20
Payer: MEDICARE

## 2023-09-20 ENCOUNTER — HOSPITAL ENCOUNTER (OUTPATIENT)
Facility: HOSPITAL | Age: 63
Setting detail: HOSPITAL OUTPATIENT SURGERY
Discharge: HOME | End: 2023-09-20
Attending: INTERNAL MEDICINE | Admitting: INTERNAL MEDICINE

## 2023-09-20 ENCOUNTER — ANESTHESIA EVENT (OUTPATIENT)
Dept: GASTROENTEROLOGY | Facility: HOSPITAL | Age: 63
End: 2023-09-20
Payer: MEDICARE

## 2023-09-20 VITALS
BODY MASS INDEX: 30.35 KG/M2 | HEART RATE: 87 BPM | RESPIRATION RATE: 15 BRPM | WEIGHT: 212 LBS | SYSTOLIC BLOOD PRESSURE: 106 MMHG | OXYGEN SATURATION: 98 % | TEMPERATURE: 97.5 F | DIASTOLIC BLOOD PRESSURE: 67 MMHG | HEIGHT: 70 IN

## 2023-09-20 DIAGNOSIS — K63.5 SERRATED POLYP OF COLON: ICD-10-CM

## 2023-09-20 PROBLEM — Z86.0101 HISTORY OF ADENOMATOUS POLYP OF COLON: Status: ACTIVE | Noted: 2020-01-02

## 2023-09-20 PROCEDURE — 45385 COLONOSCOPY W/LESION REMOVAL: CPT | Performed by: INTERNAL MEDICINE

## 2023-09-20 PROCEDURE — 88305 TISSUE EXAM BY PATHOLOGIST: CPT | Performed by: INTERNAL MEDICINE

## 2023-09-20 PROCEDURE — 25010000002 PROPOFOL 10 MG/ML EMULSION: Performed by: NURSE ANESTHETIST, CERTIFIED REGISTERED

## 2023-09-20 RX ORDER — SODIUM CHLORIDE 0.9 % (FLUSH) 0.9 %
10 SYRINGE (ML) INJECTION AS NEEDED
Status: DISCONTINUED | OUTPATIENT
Start: 2023-09-20 | End: 2023-09-20 | Stop reason: HOSPADM

## 2023-09-20 RX ORDER — SODIUM CHLORIDE 9 MG/ML
500 INJECTION, SOLUTION INTRAVENOUS CONTINUOUS PRN
Status: DISCONTINUED | OUTPATIENT
Start: 2023-09-20 | End: 2023-09-20 | Stop reason: HOSPADM

## 2023-09-20 RX ORDER — PROPOFOL 10 MG/ML
VIAL (ML) INTRAVENOUS AS NEEDED
Status: DISCONTINUED | OUTPATIENT
Start: 2023-09-20 | End: 2023-09-20 | Stop reason: SURG

## 2023-09-20 RX ADMIN — PROPOFOL INJECTABLE EMULSION 50 MG: 10 INJECTION, EMULSION INTRAVENOUS at 09:20

## 2023-09-20 RX ADMIN — PROPOFOL INJECTABLE EMULSION 100 MG: 10 INJECTION, EMULSION INTRAVENOUS at 09:12

## 2023-09-20 RX ADMIN — PROPOFOL INJECTABLE EMULSION 100 MG: 10 INJECTION, EMULSION INTRAVENOUS at 09:15

## 2023-09-20 RX ADMIN — SODIUM CHLORIDE 500 ML: 0.9 INJECTION, SOLUTION INTRAVENOUS at 09:04

## 2023-09-20 RX ADMIN — PROPOFOL INJECTABLE EMULSION 100 MG: 10 INJECTION, EMULSION INTRAVENOUS at 09:07

## 2023-09-20 NOTE — ANESTHESIA POSTPROCEDURE EVALUATION
Patient: Beni Fuchs    Procedure Summary       Date: 09/20/23 Room / Location: [facility] ENDOSCOPY 5 / [facility] ENDOSCOPY    Anesthesia Start: 0905 Anesthesia Stop: 0927    Procedure: COLONOSCOPY WITH ANESTHESIA Diagnosis:       Serrated polyp of colon      (Serrated polyp of colon [K63.5])    Surgeons: Hailey Negrete MD Provider: Drake Ríos CRNA    Anesthesia Type: MAC ASA Status: 4            Anesthesia Type: MAC    Vitals  Vitals Value Taken Time   BP 91/43 09/20/23 0927   Temp     Pulse 78 09/20/23 0927   Resp 13 09/20/23 0927   SpO2 96 % 09/20/23 0927           Post Anesthesia Care and Evaluation    Patient location during evaluation: PHASE II  Patient participation: complete - patient participated  Level of consciousness: awake and alert  Pain management: adequate    Airway patency: patent  Anesthetic complications: No anesthetic complications  PONV Status: none  Cardiovascular status: acceptable and stable  Respiratory status: acceptable and unassisted  Hydration status: acceptable

## 2023-09-20 NOTE — ANESTHESIA PREPROCEDURE EVALUATION
Anesthesia Evaluation     Patient summary reviewed   no history of anesthetic complications:   NPO Solid Status: > 8 hours             Airway   Mallampati: II  Dental      Pulmonary - negative pulmonary ROS   Cardiovascular   Exercise tolerance: excellent (>7 METS)    (+) hypertension, dysrhythmias Paroxysmal Atrial Fib      Neuro/Psych- negative ROS  GI/Hepatic/Renal/Endo    (+) obesity, renal disease (iHD last 9/18) ESRD and dialysis    Musculoskeletal     Abdominal    Substance History      OB/GYN          Other                      Anesthesia Plan    ASA 4     MAC       Anesthetic plan, risks, benefits, and alternatives have been provided, discussed and informed consent has been obtained with: patient.    CODE STATUS:

## 2023-09-21 LAB
LAB AP CASE REPORT: NORMAL
Lab: NORMAL
PATH REPORT.FINAL DX SPEC: NORMAL
PATH REPORT.GROSS SPEC: NORMAL

## 2024-02-29 ENCOUNTER — HOSPITAL ENCOUNTER (OUTPATIENT)
Dept: PREADMISSION TESTING | Age: 64
Discharge: HOME | End: 2024-03-04
Payer: MEDICARE

## 2024-02-29 VITALS — HEIGHT: 71 IN | WEIGHT: 230 LBS | BODY MASS INDEX: 32.2 KG/M2

## 2024-02-29 LAB
ABO/RH: NORMAL
ANTIBODY SCREEN: NORMAL
EKG P AXIS: 8 DEGREES
EKG P-R INTERVAL: 188 MS
EKG Q-T INTERVAL: 418 MS
EKG QRS DURATION: 84 MS
EKG QTC CALCULATION (BAZETT): 435 MS
EKG T AXIS: 18 DEGREES
MRSA DNA SPEC QL NAA+PROBE: NOT DETECTED

## 2024-02-29 PROCEDURE — 86850 RBC ANTIBODY SCREEN: CPT

## 2024-02-29 PROCEDURE — 93010 ELECTROCARDIOGRAM REPORT: CPT | Performed by: INTERNAL MEDICINE

## 2024-02-29 PROCEDURE — 86901 BLOOD TYPING SEROLOGIC RH(D): CPT

## 2024-02-29 PROCEDURE — 86900 BLOOD TYPING SEROLOGIC ABO: CPT

## 2024-02-29 PROCEDURE — 93005 ELECTROCARDIOGRAM TRACING: CPT | Performed by: ANESTHESIOLOGY

## 2024-02-29 PROCEDURE — 87641 MR-STAPH DNA AMP PROBE: CPT

## 2024-02-29 RX ORDER — AMLODIPINE BESYLATE 10 MG/1
10 TABLET ORAL DAILY
COMMUNITY
Start: 2023-08-14

## 2024-02-29 NOTE — DISCHARGE INSTRUCTIONS
PREOPERATIVE GUIDELINES WHEN RECEIVING ANESTHESIA    Do not eat or drink anything after midnight, the night before your surgery. No gum or candy the morning of surgery.  This is extremely important for your safety.    Take a bath (or shower) the night before your surgery and you may brush your teeth the morning of your surgery.    You will be scheduled to arrive at the hospital 2 hours before your surgery, or follow your surgeon's instructions.    Dress comfortably.  Wear loose clothing that will be easy to remove and comfortable for your trip home.    You may wear eyeglasses or contacts but bring your cases with you as they must be remove before your surgery.    Hearing aids and dentures will need to be removed before your surgery.    Do not wear any jewelry, including body jewelry.  All jewelry will need to be removed prior to your surgery.    Do not wear fingernail polish or make-up.    It is best not to bring any valuables with you.    If you are to stay in the hospital overnight, bring your robe, slippers and personal toiletries that you may need.      POSTOPERATIVE GUIDELINES AFTER RECEIVING ANESTHESIA    If you are to go home after your surgery, you will need a responsible adult to drive you home.     You will not be able to take public transportation after your discharge from the Operative Care Unit unless you are accompanied by a        responsible adult.    On returning home, be sure to follow your physician's orders regarding diet, activity and medications.    Remember, surgery with general anesthesia or sedation may leave you sleepy, very tired and with a decreased appetite for 12 to 24 hours.    If you develop any post-surgical complications or problems, call your surgeon or [facility] Emergency Department ([phone]).        The day before surgery you will receive a phone call from the surgery nurse to let you know what time to arrive on the day of surgery. This call will usually be between 2-4 PM. If

## 2024-03-04 NOTE — PROGRESS NOTES
Cardiology read EKG/old EKG reviewed per dr. Paredes.  Ok to proceed with anesthesia for upcoming surgery.  No further orders at present.

## 2024-03-06 RX ORDER — SODIUM CHLORIDE, SODIUM LACTATE, POTASSIUM CHLORIDE, CALCIUM CHLORIDE 600; 310; 30; 20 MG/100ML; MG/100ML; MG/100ML; MG/100ML
INJECTION, SOLUTION INTRAVENOUS CONTINUOUS
Status: CANCELLED | OUTPATIENT
Start: 2024-03-06

## 2024-03-11 NOTE — DISCHARGE INSTR - DIET
Good nutrition is important when healing from an illness, injury, or surgery.  Follow any nutrition recommendations given to you during your hospital stay.   If you were given an oral nutrition supplement while in the hospital, continue to take this supplement at home.  You can take it with meals, in-between meals, and/or before bedtime. These supplements can be purchased at most local grocery stores, pharmacies, and chain [employer]stores.   If you have any questions about your diet or nutrition, call the hospital and ask for the dietitian.            Low carb / High protein

## 2024-03-11 NOTE — DISCHARGE INSTRUCTIONS
[facility]  Dr. Wilfred Hubbard      Total Hip & Bipolar Replacement  Home Instructions     To prevent blood clots, you have been placed on the following medication:  Aspirin 81 mg twice a day for four weeks    Surgical Site Care:  Showering is permitted on post op day 2 - Saturday  No submersion in a bath, swimming pool, whirlpool, etc     Home physical therapy 2 days a week and a once a week nurse will be arranged before you get home    Weight Bearing Status:  Full unless you were told otherwise  Use a walker    Precautions  Don't walk for exercise (The longer you are on your feet the more sore you will be)  Stay close to home  You may go for short car rides    Pain Medications  You were given a prescription to fill at your pharmacy  Wean off pain medications as you deem appropriate as long as pain is under control  Take tylenol instead of the pain medicine as you improve                                                                                                                           FEVER of 101.5 or less  Please take a stool softener such as Colace to prevent constipation                   Tylenol x 2                                                                                                                                       Deep breath x 10  Do not drive until the surgeon releases you                                                          Cough, cough, cough  DO NOT SMOKE, VAPE OR CHEW!!!                                                                  Recheck in 1 - 11/2 hours    Cold packs  May be used as necessary  Be sure to have a barrier (cloth, clothing, towel) between the site and the ice pack to prevent frostbite    Contact office if  Increased redness, swelling, drainage of any kind, and/or severe pain at surgery site.  As well as new onset fevers and or chills.  These could signify an infection.  Calf tenderness to touch as well as increased swelling or  redness.  This could signify a clot.  Any rash appears, increased  or new onset nausea/vomiting occur.  This may indicate a reaction to a medication.     Phone # 3  ext [phone]. Leave a message for my assistant.  She will return your call promptly  If you have an emergency text me at  and tell me your name and problem  (Dr. Dawson)  Or contact Ortho Navigator at 1 [phone].  ([place])  Follow up with Surgeon at scheduled appointment time.

## 2024-03-14 ENCOUNTER — ANESTHESIA (OUTPATIENT)
Dept: OPERATING ROOM | Age: 64
End: 2024-03-14
Payer: MEDICARE

## 2024-03-14 ENCOUNTER — ANESTHESIA EVENT (OUTPATIENT)
Dept: OPERATING ROOM | Age: 64
End: 2024-03-14
Payer: MEDICARE

## 2024-03-14 ENCOUNTER — APPOINTMENT (OUTPATIENT)
Dept: GENERAL RADIOLOGY | Age: 64
End: 2024-03-14
Attending: ORTHOPAEDIC SURGERY
Payer: MEDICARE

## 2024-03-14 ENCOUNTER — HOSPITAL ENCOUNTER (OUTPATIENT)
Age: 64
Setting detail: OUTPATIENT SURGERY
Discharge: HOME | End: 2024-03-14
Attending: ORTHOPAEDIC SURGERY | Admitting: ORTHOPAEDIC SURGERY
Payer: MEDICARE

## 2024-03-14 VITALS
BODY MASS INDEX: 32.07 KG/M2 | SYSTOLIC BLOOD PRESSURE: 113 MMHG | DIASTOLIC BLOOD PRESSURE: 63 MMHG | HEIGHT: 71 IN | OXYGEN SATURATION: 99 % | HEART RATE: 70 BPM | WEIGHT: 229.06 LBS | TEMPERATURE: 97.4 F | RESPIRATION RATE: 18 BRPM

## 2024-03-14 DIAGNOSIS — M16.11 PRIMARY OSTEOARTHRITIS OF RIGHT HIP: Primary | ICD-10-CM

## 2024-03-14 LAB
ABO/RH: NORMAL
ANION GAP SERPL CALCULATED.3IONS-SCNC: 15 MMOL/L (ref 7–19)
ANTIBODY SCREEN: NORMAL
APTT PPP: 31.7 SEC (ref 26–36.2)
BASOPHILS # BLD: 0.1 K/UL (ref 0–0.2)
BASOPHILS NFR BLD: 1.7 % (ref 0–1)
BUN SERPL-MCNC: 29 MG/DL (ref 8–23)
CALCIUM SERPL-MCNC: 9.1 MG/DL (ref 8.8–10.2)
CHLORIDE SERPL-SCNC: 94 MMOL/L (ref 98–111)
CO2 SERPL-SCNC: 31 MMOL/L (ref 22–29)
CREAT SERPL-MCNC: 6.5 MG/DL (ref 0.5–1.2)
EOSINOPHIL # BLD: 0.4 K/UL (ref 0–0.6)
EOSINOPHIL NFR BLD: 5.7 % (ref 0–5)
ERYTHROCYTE [DISTWIDTH] IN BLOOD BY AUTOMATED COUNT: 14.1 % (ref 11.5–14.5)
GLUCOSE SERPL-MCNC: 108 MG/DL (ref 74–109)
HCT VFR BLD AUTO: 35.9 % (ref 42–52)
HGB BLD-MCNC: 11.2 G/DL (ref 14–18)
IMM GRANULOCYTES # BLD: 0 K/UL
INR PPP: 1.06 (ref 0.88–1.18)
LYMPHOCYTES # BLD: 1.3 K/UL (ref 1.1–4.5)
LYMPHOCYTES NFR BLD: 18 % (ref 20–40)
MCH RBC QN AUTO: 31.6 PG (ref 27–31)
MCHC RBC AUTO-ENTMCNC: 31.2 G/DL (ref 33–37)
MCV RBC AUTO: 101.4 FL (ref 80–94)
MONOCYTES # BLD: 0.6 K/UL (ref 0–0.9)
MONOCYTES NFR BLD: 8.1 % (ref 0–10)
NEUTROPHILS # BLD: 4.6 K/UL (ref 1.5–7.5)
NEUTS SEG NFR BLD: 66.1 % (ref 50–65)
PLATELET # BLD AUTO: 273 K/UL (ref 130–400)
PMV BLD AUTO: 10 FL (ref 9.4–12.4)
POTASSIUM SERPL-SCNC: 4 MMOL/L (ref 3.5–4.9)
PROTHROMBIN TIME: 13.5 SEC (ref 12–14.6)
RBC # BLD AUTO: 3.54 M/UL (ref 4.7–6.1)
SODIUM SERPL-SCNC: 140 MMOL/L (ref 136–145)
WBC # BLD AUTO: 7 K/UL (ref 4.8–10.8)

## 2024-03-14 PROCEDURE — 6370000000 HC RX 637 (ALT 250 FOR IP): Performed by: ORTHOPAEDIC SURGERY

## 2024-03-14 PROCEDURE — 86901 BLOOD TYPING SEROLOGIC RH(D): CPT

## 2024-03-14 PROCEDURE — 7100000011 HC PHASE II RECOVERY - ADDTL 15 MIN: Performed by: ORTHOPAEDIC SURGERY

## 2024-03-14 PROCEDURE — 85610 PROTHROMBIN TIME: CPT

## 2024-03-14 PROCEDURE — 6360000002 HC RX W HCPCS: Performed by: ORTHOPAEDIC SURGERY

## 2024-03-14 PROCEDURE — 7100000000 HC PACU RECOVERY - FIRST 15 MIN: Performed by: ORTHOPAEDIC SURGERY

## 2024-03-14 PROCEDURE — 3700000001 HC ADD 15 MINUTES (ANESTHESIA): Performed by: ORTHOPAEDIC SURGERY

## 2024-03-14 PROCEDURE — 86900 BLOOD TYPING SEROLOGIC ABO: CPT

## 2024-03-14 PROCEDURE — 7100000001 HC PACU RECOVERY - ADDTL 15 MIN: Performed by: ORTHOPAEDIC SURGERY

## 2024-03-14 PROCEDURE — 85730 THROMBOPLASTIN TIME PARTIAL: CPT

## 2024-03-14 PROCEDURE — 2709999900 HC NON-CHARGEABLE SUPPLY: Performed by: ORTHOPAEDIC SURGERY

## 2024-03-14 PROCEDURE — 86850 RBC ANTIBODY SCREEN: CPT

## 2024-03-14 PROCEDURE — 2580000003 HC RX 258

## 2024-03-14 PROCEDURE — 80048 BASIC METABOLIC PNL TOTAL CA: CPT

## 2024-03-14 PROCEDURE — 3600000015 HC SURGERY LEVEL 5 ADDTL 15MIN: Performed by: ORTHOPAEDIC SURGERY

## 2024-03-14 PROCEDURE — 73502 X-RAY EXAM HIP UNI 2-3 VIEWS: CPT

## 2024-03-14 PROCEDURE — C1713 ANCHOR/SCREW BN/BN,TIS/BN: HCPCS | Performed by: ORTHOPAEDIC SURGERY

## 2024-03-14 PROCEDURE — 97161 PT EVAL LOW COMPLEX 20 MIN: CPT

## 2024-03-14 PROCEDURE — 6360000002 HC RX W HCPCS

## 2024-03-14 PROCEDURE — 7100000010 HC PHASE II RECOVERY - FIRST 15 MIN: Performed by: ORTHOPAEDIC SURGERY

## 2024-03-14 PROCEDURE — C1776 JOINT DEVICE (IMPLANTABLE): HCPCS | Performed by: ORTHOPAEDIC SURGERY

## 2024-03-14 PROCEDURE — 2580000003 HC RX 258: Performed by: ANESTHESIOLOGY

## 2024-03-14 PROCEDURE — A4217 STERILE WATER/SALINE, 500 ML: HCPCS | Performed by: ORTHOPAEDIC SURGERY

## 2024-03-14 PROCEDURE — 2500000003 HC RX 250 WO HCPCS: Performed by: ORTHOPAEDIC SURGERY

## 2024-03-14 PROCEDURE — 85025 COMPLETE CBC W/AUTO DIFF WBC: CPT

## 2024-03-14 PROCEDURE — 3700000000 HC ANESTHESIA ATTENDED CARE: Performed by: ORTHOPAEDIC SURGERY

## 2024-03-14 PROCEDURE — 2580000003 HC RX 258: Performed by: ORTHOPAEDIC SURGERY

## 2024-03-14 PROCEDURE — 2500000003 HC RX 250 WO HCPCS

## 2024-03-14 PROCEDURE — 36415 COLL VENOUS BLD VENIPUNCTURE: CPT

## 2024-03-14 PROCEDURE — 3600000005 HC SURGERY LEVEL 5 BASE: Performed by: ORTHOPAEDIC SURGERY

## 2024-03-14 DEVICE — HEAD, FEMORAL, CERAMIC, BILOX DELTA, 28MM NEUTRAL
Type: IMPLANTABLE DEVICE | Site: HIP | Status: FUNCTIONAL
Brand: DJO SURGICAL

## 2024-03-14 DEVICE — IMPLANTABLE DEVICE: Type: IMPLANTABLE DEVICE | Site: HIP | Status: FUNCTIONAL

## 2024-03-14 DEVICE — EMPOWR ACET SYSTEM, CUP, HEMISPHERICAL, CLUSTER HOLE, 56MM
Type: IMPLANTABLE DEVICE | Site: HIP | Status: FUNCTIONAL
Brand: DJO SURGICAL

## 2024-03-14 DEVICE — EMPOWR ACETABULAR, BONE SCREW, 40MM
Type: IMPLANTABLE DEVICE | Site: HIP | Status: FUNCTIONAL
Brand: DJO SURGICAL

## 2024-03-14 RX ORDER — OXYCODONE HYDROCHLORIDE 5 MG/1
5 TABLET ORAL EVERY 4 HOURS PRN
Qty: 50 TABLET | Refills: 0 | Status: SHIPPED | OUTPATIENT
Start: 2024-03-14 | End: 2024-03-22

## 2024-03-14 RX ORDER — PROPOFOL 10 MG/ML
INJECTION, EMULSION INTRAVENOUS PRN
Status: DISCONTINUED | OUTPATIENT
Start: 2024-03-14 | End: 2024-03-14 | Stop reason: SDUPTHER

## 2024-03-14 RX ORDER — DEXAMETHASONE SODIUM PHOSPHATE 10 MG/ML
8 INJECTION, SOLUTION INTRAMUSCULAR; INTRAVENOUS ONCE
Status: DISCONTINUED | OUTPATIENT
Start: 2024-03-14 | End: 2024-03-14 | Stop reason: HOSPADM

## 2024-03-14 RX ORDER — OXYCODONE HYDROCHLORIDE 5 MG/1
5 TABLET ORAL ONCE
Status: COMPLETED | OUTPATIENT
Start: 2024-03-14 | End: 2024-03-14

## 2024-03-14 RX ORDER — SODIUM CHLORIDE, SODIUM LACTATE, POTASSIUM CHLORIDE, CALCIUM CHLORIDE 600; 310; 30; 20 MG/100ML; MG/100ML; MG/100ML; MG/100ML
INJECTION, SOLUTION INTRAVENOUS CONTINUOUS PRN
Status: DISCONTINUED | OUTPATIENT
Start: 2024-03-14 | End: 2024-03-14 | Stop reason: SDUPTHER

## 2024-03-14 RX ORDER — ACETAMINOPHEN 500 MG
1000 TABLET ORAL ONCE
Status: COMPLETED | OUTPATIENT
Start: 2024-03-14 | End: 2024-03-14

## 2024-03-14 RX ORDER — IBUPROFEN 400 MG/1
400 TABLET ORAL EVERY 8 HOURS PRN
Qty: 30 TABLET | Refills: 0 | Status: SHIPPED | OUTPATIENT
Start: 2024-03-14

## 2024-03-14 RX ORDER — EPHEDRINE SULFATE 50 MG/ML
INJECTION, SOLUTION INTRAVENOUS PRN
Status: DISCONTINUED | OUTPATIENT
Start: 2024-03-14 | End: 2024-03-14 | Stop reason: SDUPTHER

## 2024-03-14 RX ORDER — HYDROMORPHONE HYDROCHLORIDE 1 MG/ML
0.5 INJECTION, SOLUTION INTRAMUSCULAR; INTRAVENOUS; SUBCUTANEOUS EVERY 5 MIN PRN
Status: DISCONTINUED | OUTPATIENT
Start: 2024-03-14 | End: 2024-03-14 | Stop reason: HOSPADM

## 2024-03-14 RX ORDER — HYDROMORPHONE HYDROCHLORIDE 1 MG/ML
0.25 INJECTION, SOLUTION INTRAMUSCULAR; INTRAVENOUS; SUBCUTANEOUS EVERY 5 MIN PRN
Status: DISCONTINUED | OUTPATIENT
Start: 2024-03-14 | End: 2024-03-14 | Stop reason: HOSPADM

## 2024-03-14 RX ORDER — SCOLOPAMINE TRANSDERMAL SYSTEM 1 MG/1
1 PATCH, EXTENDED RELEASE TRANSDERMAL ONCE
Status: DISCONTINUED | OUTPATIENT
Start: 2024-03-14 | End: 2024-03-14 | Stop reason: HOSPADM

## 2024-03-14 RX ORDER — ONDANSETRON 2 MG/ML
4 INJECTION INTRAMUSCULAR; INTRAVENOUS
Status: DISCONTINUED | OUTPATIENT
Start: 2024-03-14 | End: 2024-03-14 | Stop reason: HOSPADM

## 2024-03-14 RX ORDER — TRANEXAMIC ACID 650 MG/1
1950 TABLET ORAL
Status: COMPLETED | OUTPATIENT
Start: 2024-03-14 | End: 2024-03-14

## 2024-03-14 RX ORDER — SODIUM CHLORIDE 9 MG/ML
INJECTION, SOLUTION INTRAVENOUS PRN
Status: DISCONTINUED | OUTPATIENT
Start: 2024-03-14 | End: 2024-03-14 | Stop reason: HOSPADM

## 2024-03-14 RX ORDER — ONDANSETRON 2 MG/ML
INJECTION INTRAMUSCULAR; INTRAVENOUS PRN
Status: DISCONTINUED | OUTPATIENT
Start: 2024-03-14 | End: 2024-03-14 | Stop reason: SDUPTHER

## 2024-03-14 RX ORDER — NITROGLYCERIN 20 MG/100ML
INJECTION INTRAVENOUS PRN
Status: DISCONTINUED | OUTPATIENT
Start: 2024-03-14 | End: 2024-03-14 | Stop reason: SDUPTHER

## 2024-03-14 RX ORDER — OXYCODONE HCL 10 MG/1
10 TABLET, FILM COATED, EXTENDED RELEASE ORAL ONCE
Status: COMPLETED | OUTPATIENT
Start: 2024-03-14 | End: 2024-03-14

## 2024-03-14 RX ORDER — SODIUM CHLORIDE 450 MG/100ML
INJECTION, SOLUTION INTRAVENOUS ONCE
Status: COMPLETED | OUTPATIENT
Start: 2024-03-14 | End: 2024-03-14

## 2024-03-14 RX ORDER — NALOXONE HYDROCHLORIDE 0.4 MG/ML
INJECTION, SOLUTION INTRAMUSCULAR; INTRAVENOUS; SUBCUTANEOUS PRN
Status: DISCONTINUED | OUTPATIENT
Start: 2024-03-14 | End: 2024-03-14 | Stop reason: HOSPADM

## 2024-03-14 RX ORDER — DEXAMETHASONE SODIUM PHOSPHATE 4 MG/ML
INJECTION, SOLUTION INTRA-ARTICULAR; INTRALESIONAL; INTRAMUSCULAR; INTRAVENOUS; SOFT TISSUE PRN
Status: DISCONTINUED | OUTPATIENT
Start: 2024-03-14 | End: 2024-03-14 | Stop reason: SDUPTHER

## 2024-03-14 RX ORDER — SODIUM CHLORIDE 0.9 % (FLUSH) 0.9 %
5-40 SYRINGE (ML) INJECTION PRN
Status: DISCONTINUED | OUTPATIENT
Start: 2024-03-14 | End: 2024-03-14 | Stop reason: HOSPADM

## 2024-03-14 RX ORDER — CEPHALEXIN 500 MG/1
500 CAPSULE ORAL 4 TIMES DAILY
Qty: 28 CAPSULE | Refills: 0 | Status: SHIPPED | OUTPATIENT
Start: 2024-03-14

## 2024-03-14 RX ORDER — ROCURONIUM BROMIDE 10 MG/ML
INJECTION, SOLUTION INTRAVENOUS PRN
Status: DISCONTINUED | OUTPATIENT
Start: 2024-03-14 | End: 2024-03-14 | Stop reason: SDUPTHER

## 2024-03-14 RX ORDER — FENTANYL CITRATE 50 UG/ML
INJECTION, SOLUTION INTRAMUSCULAR; INTRAVENOUS PRN
Status: DISCONTINUED | OUTPATIENT
Start: 2024-03-14 | End: 2024-03-14 | Stop reason: SDUPTHER

## 2024-03-14 RX ORDER — CELECOXIB 200 MG/1
200 CAPSULE ORAL ONCE
Status: DISCONTINUED | OUTPATIENT
Start: 2024-03-14 | End: 2024-03-14 | Stop reason: HOSPADM

## 2024-03-14 RX ORDER — LIDOCAINE HYDROCHLORIDE 10 MG/ML
INJECTION, SOLUTION EPIDURAL; INFILTRATION; INTRACAUDAL; PERINEURAL PRN
Status: DISCONTINUED | OUTPATIENT
Start: 2024-03-14 | End: 2024-03-14 | Stop reason: SDUPTHER

## 2024-03-14 RX ORDER — ASPIRIN 81 MG/1
81 TABLET, CHEWABLE ORAL 2 TIMES DAILY
Qty: 60 TABLET | Refills: 0 | Status: SHIPPED | OUTPATIENT
Start: 2024-03-14

## 2024-03-14 RX ORDER — SODIUM CHLORIDE 0.9 % (FLUSH) 0.9 %
5-40 SYRINGE (ML) INJECTION EVERY 12 HOURS SCHEDULED
Status: DISCONTINUED | OUTPATIENT
Start: 2024-03-14 | End: 2024-03-14 | Stop reason: HOSPADM

## 2024-03-14 RX ORDER — ROPIVACAINE HYDROCHLORIDE 2 MG/ML
INJECTION, SOLUTION EPIDURAL; INFILTRATION; PERINEURAL PRN
Status: DISCONTINUED | OUTPATIENT
Start: 2024-03-14 | End: 2024-03-14 | Stop reason: ALTCHOICE

## 2024-03-14 RX ADMIN — OXYCODONE HYDROCHLORIDE 10 MG: 10 TABLET, FILM COATED, EXTENDED RELEASE ORAL at 09:36

## 2024-03-14 RX ADMIN — DEXAMETHASONE SODIUM PHOSPHATE 10 MG: 4 INJECTION, SOLUTION INTRAMUSCULAR; INTRAVENOUS at 10:48

## 2024-03-14 RX ADMIN — FENTANYL CITRATE 25 MCG: 0.05 INJECTION, SOLUTION INTRAMUSCULAR; INTRAVENOUS at 11:09

## 2024-03-14 RX ADMIN — PROPOFOL 20 MG: 10 INJECTION, EMULSION INTRAVENOUS at 11:13

## 2024-03-14 RX ADMIN — PHENYLEPHRINE HYDROCHLORIDE 200 MCG: 10 INJECTION INTRAVENOUS at 10:59

## 2024-03-14 RX ADMIN — EPHEDRINE SULFATE 5 MG: 50 INJECTION INTRAMUSCULAR; INTRAVENOUS; SUBCUTANEOUS at 11:50

## 2024-03-14 RX ADMIN — FENTANYL CITRATE 25 MCG: 0.05 INJECTION, SOLUTION INTRAMUSCULAR; INTRAVENOUS at 11:30

## 2024-03-14 RX ADMIN — SUGAMMADEX 200 MG: 100 INJECTION, SOLUTION INTRAVENOUS at 11:54

## 2024-03-14 RX ADMIN — HYDROMORPHONE HYDROCHLORIDE 0.5 MG: 1 INJECTION, SOLUTION INTRAMUSCULAR; INTRAVENOUS; SUBCUTANEOUS at 12:26

## 2024-03-14 RX ADMIN — PROPOFOL 30 MG: 10 INJECTION, EMULSION INTRAVENOUS at 11:09

## 2024-03-14 RX ADMIN — PHENYLEPHRINE HYDROCHLORIDE 100 MCG: 10 INJECTION INTRAVENOUS at 10:56

## 2024-03-14 RX ADMIN — ONDANSETRON 4 MG: 2 INJECTION INTRAMUSCULAR; INTRAVENOUS at 11:53

## 2024-03-14 RX ADMIN — SODIUM CHLORIDE, SODIUM LACTATE, POTASSIUM CHLORIDE, AND CALCIUM CHLORIDE: 600; 310; 30; 20 INJECTION, SOLUTION INTRAVENOUS at 11:28

## 2024-03-14 RX ADMIN — FENTANYL CITRATE 50 MCG: 0.05 INJECTION, SOLUTION INTRAMUSCULAR; INTRAVENOUS at 10:33

## 2024-03-14 RX ADMIN — TRANEXAMIC ACID 1950 MG: 650 TABLET ORAL at 09:36

## 2024-03-14 RX ADMIN — SODIUM CHLORIDE: 4.5 INJECTION, SOLUTION INTRAVENOUS at 09:36

## 2024-03-14 RX ADMIN — PHENYLEPHRINE HYDROCHLORIDE 200 MCG: 10 INJECTION INTRAVENOUS at 11:01

## 2024-03-14 RX ADMIN — PROPOFOL 150 MG: 10 INJECTION, EMULSION INTRAVENOUS at 10:33

## 2024-03-14 RX ADMIN — PHENYLEPHRINE HYDROCHLORIDE 200 MCG: 10 INJECTION INTRAVENOUS at 11:52

## 2024-03-14 RX ADMIN — EPHEDRINE SULFATE 5 MG: 50 INJECTION INTRAMUSCULAR; INTRAVENOUS; SUBCUTANEOUS at 11:01

## 2024-03-14 RX ADMIN — FENTANYL CITRATE 25 MCG: 0.05 INJECTION, SOLUTION INTRAMUSCULAR; INTRAVENOUS at 11:59

## 2024-03-14 RX ADMIN — CEFAZOLIN 2000 MG: 2 INJECTION, POWDER, FOR SOLUTION INTRAMUSCULAR; INTRAVENOUS at 10:43

## 2024-03-14 RX ADMIN — PHENYLEPHRINE HYDROCHLORIDE 100 MCG: 10 INJECTION INTRAVENOUS at 12:02

## 2024-03-14 RX ADMIN — NITROGLYCERIN 40 MCG: 20 INJECTION INTRAVENOUS at 11:42

## 2024-03-14 RX ADMIN — HYDROMORPHONE HYDROCHLORIDE 0.5 MG: 1 INJECTION, SOLUTION INTRAMUSCULAR; INTRAVENOUS; SUBCUTANEOUS at 12:32

## 2024-03-14 RX ADMIN — SODIUM CHLORIDE, SODIUM LACTATE, POTASSIUM CHLORIDE, AND CALCIUM CHLORIDE: 600; 310; 30; 20 INJECTION, SOLUTION INTRAVENOUS at 10:29

## 2024-03-14 RX ADMIN — ACETAMINOPHEN 1000 MG: 500 TABLET ORAL at 09:36

## 2024-03-14 RX ADMIN — NITROGLYCERIN 40 MCG: 20 INJECTION INTRAVENOUS at 11:10

## 2024-03-14 RX ADMIN — ROCURONIUM BROMIDE 50 MG: 10 INJECTION, SOLUTION INTRAVENOUS at 10:33

## 2024-03-14 RX ADMIN — LIDOCAINE HYDROCHLORIDE 50 MG: 10 INJECTION, SOLUTION EPIDURAL; INFILTRATION; INTRACAUDAL; PERINEURAL at 10:33

## 2024-03-14 RX ADMIN — PHENYLEPHRINE HYDROCHLORIDE 100 MCG: 10 INJECTION INTRAVENOUS at 11:50

## 2024-03-14 RX ADMIN — NITROGLYCERIN 40 MCG: 20 INJECTION INTRAVENOUS at 11:28

## 2024-03-14 RX ADMIN — OXYCODONE 5 MG: 5 TABLET ORAL at 13:10

## 2024-03-14 ASSESSMENT — PAIN DESCRIPTION - ORIENTATION
ORIENTATION: RIGHT
ORIENTATION: RIGHT

## 2024-03-14 ASSESSMENT — PAIN DESCRIPTION - LOCATION
LOCATION: HIP
LOCATION: HIP

## 2024-03-14 ASSESSMENT — PAIN SCALES - GENERAL
PAINLEVEL_OUTOF10: 7
PAINLEVEL_OUTOF10: 7

## 2024-03-14 ASSESSMENT — PAIN - FUNCTIONAL ASSESSMENT
PAIN_FUNCTIONAL_ASSESSMENT: 0-10

## 2024-03-14 ASSESSMENT — PAIN DESCRIPTION - DESCRIPTORS: DESCRIPTORS: ACHING

## 2024-03-14 NOTE — H&P
OB?  Yes,  11w6d  Pharmacy Verified? Yes   Reason for Call: pregnancy, pregnancy, Patient states if she can get CPT code for the cysticfibrois first trimester screening  Best form of Contact:      Cell Phone:   Telephone Information:   Mobile [phone]     Okay to leave a detailed message regarding call? Yes    Have you or a member of your household experienced any of the following symptoms?  Fever greater than 100.4? No  New or worsening cough, runny nose, shortness of breath or sore throat? No  Headache, body aches or fatigue? No  Nausea, vomiting or diarrhea? No  New onset of loss of taste or smell? No  Have you or a member of your household had contact with a laboratory confirmed COVID-19 person within the past 14 days? No  Have you or a member of your household traveled outside of [place] within the last 14 days, if yes where? No         End Date Taking? Authorizing Provider   vitamin D (CHOLECALCIFEROL) 25 MCG (1000 UT) TABS tablet Take 1 tablet by mouth three times a week    Abelardo Briceno MD   SEVELAMER CARBONATE PO Take 3 tablets by mouth 3 times daily (with meals)    Abelardo Briceno MD   amLODIPine (NORVASC) 10 MG tablet Take 1 tablet by mouth daily 8/14/23   Abelardo Briceno MD   iron sucrose (VENOFER) 20 MG/ML injection Infuse 2.5 mLs intravenously as needed (prn with dialysis) 3/23/21 3/15/22  Abelardo Briceno MD   calcitRIOL (ROCALTROL) 0.5 MCG capsule Take 1 capsule by mouth three times a week 2/4/21   Abelardo Briceno MD   lidocaine-prilocaine (EMLA) 2.5-2.5 % cream Apply topically as needed  1/28/21   Abelardo Briceno MD   B Complex Vitamins (VITAMIN-B COMPLEX PO) Take 1 tablet by mouth daily     Abelardo Briceno MD       Allergies:  Patient has no known allergies.    Social History:   Tobacco:  reports that he has quit smoking. His smoking use included cigarettes. He started smoking about 49 years ago. He has a 41.0 pack-year smoking history. He has never used smokeless tobacco.   Alcohol:  reports no history of alcohol use.    Review of Systems:  General: Denies any fever or chills  EYES: Denies any diplopia  ENT: Tinnitus or vertigo  Resp: Denies any shortness of breath, cough or wheezing  Cardiac: Denies any chest pain, palpitations, claudication or edema  GI: Denies any melena, hematochezia, hematemesis or pyrosis  : Denies any frequency, urgency, hesitancy or incontinence  Musculoskeletal: Denies back pain, joint pain, myalgias  Heme: Denies bruising or bleeding easily  Endocrine: Denies any history of diabetes or thyroid disease  Psych: Denies anxiety or depression  Neuro: Denies any focal motor or sensory deficits      Physical Exam:  Vitals: BP (!) 155/68   Pulse 88   Temp 98 °F (36.7 °C) (Temporal)   Resp 18   Ht 1.803 m (5' 11\")   Wt 103.9 kg (229 lb 0.9 oz)   SpO2 96%   BMI 31.95  kg/m²   CONSTITUTIONAL: Alert, appropriate, no acute distress.  PSYCH: mood and affect are normal with a normal rate and tone of speech  EYES: Non icteric, EOM intact, pupils equal round and reactive to light  ENT: Mucus membranes moist, no oral pharyngeal lesions, nares patent   NECK: Supple, no masses, no JVD, trachea mid line   CHEST/LUNGS: CTA bilaterally, normal respiratory effort   CARDIOVASCULAR: RRR, no murmurs,  2+ DP and radial pulses bilaterally  ABDOMEN: soft, nontender  EXTREMITIES: warm, well perfused, no edema.  Right hip joint with mildly reduced range of motion and generalized tenderness.  Neurovascular exam normal  SKIN: warm, dry with no rashes or lesions  LYMPH: No cervical or inguinal lymphadenopathy    RADIOLOGY: xrays of extremity show right severe hip arthritis  LABORATORY:    CBC :    Lab Results   Component Value Date/Time    WBC 9.0 08/09/2022 10:43 AM    HGB 10.5 08/09/2022 10:43 AM    HCT 33.4 08/09/2022 10:43 AM     08/09/2022 10:43 AM     BMP:   Lab Results   Component Value Date/Time     08/09/2022 10:43 AM    K 3.9 08/09/2022 10:43 AM    CL 93 08/09/2022 10:43 AM    CO2 34 08/09/2022 10:43 AM    BUN 19 08/09/2022 10:43 AM    LABALBU 4.8 08/09/2022 10:43 AM    CREATININE 4.5 08/09/2022 10:43 AM    CALCIUM 9.8 08/09/2022 10:43 AM    GFRAA 16 08/09/2022 10:43 AM    LABGLOM 13 08/09/2022 10:43 AM    GLUCOSE 103 08/09/2022 10:43 AM     PT/INR:    Lab Results   Component Value Date/Time    PROTIME 12.9 08/09/2022 10:43 AM    INR 0.98 08/09/2022 10:43 AM     U/A:   Lab Results   Component Value Date/Time    WBCUA 3 09/06/2019 01:49 PM    RBCUA 11 09/06/2019 01:49 PM    BACTERIA NEGATIVE 09/06/2019 01:49 PM     HgBA1c:  No components found for: \"HGBA1C\"    Assessment:  Right severe primary hip osteoarthritis.  Most recent office note reviewed and there has been no change in health status.     PLAN: Right Hip replacement. I explained to the patient/family the patient's diagnosis

## 2024-03-14 NOTE — PROGRESS NOTES
CLINICAL PHARMACY NOTE: MEDS TO BEDS    Total # of Prescriptions Filled: 4   The following medications were delivered to the patient:  Discharge Medication List as of 3/14/2024  1:37 PM        START taking these medications    Details   aspirin (ASPIRIN CHILDRENS) 81 MG chewable tablet Take 1 tablet by mouth in the morning and at bedtime, Disp-60 tablet, R-0Normal      oxyCODONE (ROXICODONE) 5 MG immediate release tablet Take 1 tablet by mouth every 4 hours as needed for Pain for up to 8 days. Max Daily Amount: 30 mg, Disp-50 tablet, R-0Normal      ibuprofen (ADVIL;MOTRIN) 400 MG tablet Take 1 tablet by mouth every 8 hours as needed for Pain, Disp-30 tablet, R-0Normal      cephALEXin (KEFLEX) 500 MG capsule Take 1 capsule by mouth 4 times daily, Disp-28 capsule, R-0Normal               Additional Documentation:  Handed to patient at [facility]. Paid with card

## 2024-03-14 NOTE — ANESTHESIA PRE PROCEDURE
Department of Anesthesiology  Preprocedure Note       Name:  Michael Coleman   Age:  63 y.o.  :  1960                                          MRN:  [id number]         Date:  3/14/2024      Surgeon: Surgeon(s):  Felix Dawson MD    Procedure: Procedure(s):  RIGHT TOTAL HIP ARTHROPLASTY ANTERIOR APPROACH    Medications prior to admission:   Prior to Admission medications    Medication Sig Start Date End Date Taking? Authorizing Provider   vitamin D (CHOLECALCIFEROL) 25 MCG (1000 UT) TABS tablet Take 1 tablet by mouth three times a week    Abelardo Briceno MD   SEVELAMER CARBONATE PO Take 3 tablets by mouth 3 times daily (with meals)    Abelardo Briceno MD   amLODIPine (NORVASC) 10 MG tablet Take 1 tablet by mouth daily 23   Abelardo Briceno MD   iron sucrose (VENOFER) 20 MG/ML injection Infuse 2.5 mLs intravenously as needed (prn with dialysis) 3/23/21 3/15/22  Abelardo Briceno MD   calcitRIOL (ROCALTROL) 0.5 MCG capsule Take 1 capsule by mouth three times a week 21   Abelardo Briceno MD   lidocaine-prilocaine (EMLA) 2.5-2.5 % cream Apply topically as needed  21   Abelardo Briceno MD   B Complex Vitamins (VITAMIN-B COMPLEX PO) Take 1 tablet by mouth daily     Abelardo Briceno MD       Current medications:    Current Facility-Administered Medications   Medication Dose Route Frequency Provider Last Rate Last Admin   • oxyCODONE (OXYCONTIN) extended release tablet 10 mg  10 mg Oral Once Felix Dawson MD       • scopolamine (TRANSDERM-SCOP) transdermal patch 1 patch  1 patch TransDERmal Once Felix Dawson MD       • acetaminophen (TYLENOL) tablet 1,000 mg  1,000 mg Oral Once Felix Dawson MD       • celecoxib (CELEBREX) capsule 200 mg  200 mg Oral Once Felix Dawson MD       • dexAMETHasone (PF) (DECADRON) injection 8 mg  8 mg IntraVENous Once Felix Dawson MD       • tranexamic acid (LYSTEDA)

## 2024-03-14 NOTE — PROGRESS NOTES
Physical Therapy  Physical Therapy     Facility/Department: [facility] OR  Initial Assessment  PHYSICAL THERAPY EVALUATION      Michael Coleman    : 1960  MRN: [id number]   PHYSICIAN:  No att. 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    CKD (chronic kidney disease) stage V requiring chronic dialysis (formerly [facility])    Anemia of chronic renal failure, stage 5 (formerly [facility])    ESRD (end stage renal disease) (formerly [facility])    Iron deficiency anemia, unspecified    ESRD on dialysis (formerly [facility])    PD catheter dysfunction (formerly [facility])       Rehabilitation Diagnosis:     Primary osteoarthritis of right hip [M16.11]       SERVICE DATE: 3/14/2024        SUBJECTIVE: Patient states that they are planning on discharging home today    Pain Screening  Patient Currently in Pain: No    PRIOR LEVEL OF FUNCTION:    [x] Independent in community no assistive device     [] Independent in community with assistive device     [] Independent in the house with assistive device     [] Transfer only    []     OBJECTIVE:  Orientation: Within functional Limits      ROM - Passive, Non-operative side     [x] Left lower extremity  [] Right lower extremity       Within functional limits    ROM - Passive, operative side    [] Left lower extremity  [x] Right lower extremity      Hip - extension to 0 degrees and flexion to 80 in sitting    Knee - extension to 0 degrees in supine and flexion to 80 in sitting        STRENGTH - Non-operative side    [x] Left lower extremity  [] Right lower extremity       Within functional limits    STRENGTH - operative side    [] Left lower extremity  [x] Right lower extremity    Grossly  3-/5        TRANSFERS   Sit to stand     [x] CGA [] Minimum        Bed to chair     [x] CGA [] Minimum    Bed mobility   Supine to sit      [x] CGA [] Minimum      Scoot  [] Side to side  [] Up and down     [x] CGA [] Minimum    AMBULATION   Weight bearing: -  WBAT      Distance:40 ft     Device: Rolling Walker - The patient has been trained on the use of this equipment     Assistance:       [x] CGA [] Modified Independent [] Stand by / Supervision [] Minimum         BALANCE   Sitting    Good    Standing    Good     ASSESSMENT   Activity limitations: Decreased functional mobility   Patient will benefit from continuing skilled physical therapy to improve mobility    Activity Tolerance  Activity Tolerance: Patient Tolerated treatment well     PLAN        Physical therapy to see 7 X/ week for 2 weeks then reassess. Plan of care to include:   Current Treatment Recommendations: Functional Mobility Training, Transfer Training  Gait Training, Safety Education & Training, Patient/Caregiver Education & Training    PT Education     Precautions; Transfer Training; General Safety; Family Education; Equipment; Weight-bearing Education; Gait Training; Functional Mobility Training      GOALS    Short term goals  Time Frame for Short term goals: 2 weeks  Short term goal 1: Independent with bed mobility and transfers  Short term goal 2: Ambulate 100 feet independently  Short term goal 3: Verbalize understanding of ascending and descending steps    Discharge Recommendations:  Home with assist PRN, Patient would benefit from continued therapy after discharge     Comments:  Patient seen in same day surgery and nursing is actively managing pain  Patient instructed to request assistance before getting up  Nursing updated      Electronically signed by Sydni Plasencia PT on 3/14/2024 at 3:05 PM

## 2024-03-14 NOTE — ANESTHESIA POSTPROCEDURE EVALUATION
Department of Anesthesiology  Postprocedure Note    Patient: Michael Coleman  MRN: [id number]  YOB: 1960  Date of evaluation: 3/14/2024    Procedure Summary       Date: 03/14/24 Room / Location: [address]    Anesthesia Start: 1028 Anesthesia Stop:     Procedure: RIGHT TOTAL HIP ARTHROPLASTY ANTERIOR APPROACH (Right) Diagnosis:       Primary osteoarthritis of right hip      (Primary osteoarthritis of right hip [M16.11])    Surgeons: Felix Dawson MD Responsible Provider: Amarilys Walter APRN - CRNA    Anesthesia Type: general ASA Status: 4            Anesthesia Type: No value filed.    Angelita Phase I: Angelita Score: 7    Angelita Phase II:      Anesthesia Post Evaluation    Patient location during evaluation: PACU  Patient participation: waiting for patient participation  Level of consciousness: sleepy but conscious  Pain score: 0  Airway patency: patent  Nausea & Vomiting: no vomiting and no nausea  Cardiovascular status: blood pressure returned to baseline and hemodynamically stable  Respiratory status: acceptable, oral airway and face mask  Hydration status: euvolemic  Comments: BP (!) 105/50   Pulse 66   Temp 98.4 °F (36.9 °C)   Resp 14   Ht 1.803 m (5' 11\")   Wt 103.9 kg (229 lb 0.9 oz)   SpO2 100%   BMI 31.95 kg/m²     Pain management: adequate    No notable events documented.

## 2024-03-14 NOTE — OP NOTE
reamer size until good subchondral bone was found.  A maximized view of the pelvis was obtained with the c-arm and the acetabular component was impacted in place at 45 degrees of abduction and 20 degrees of anteversion.  The liner was snapped in place.      The lift hook for the Sullivan bed was placed around the proximal femur and the leg was externally rotated 120 degrees and the hip was extended and adducted.  Bent lissy retractors were placed medial to the femoral neck and proximal to the greater trochanter.  The lift hook was raised and the lateral capsule was released off the medial surface of the greater trochanter and lateral neck.    A cookie cutter was used to remove metaphyseal bone.  A rat tail rasp was used to work the lateral bone.  The femoral canal was broached from a size 8 up to a size where the broach was axially and rotationally stable.  The anteversion of the broach was 10 degrees.  The canal was not reamed.  A calcar planer was used to remove minimal neck bone.  The trial neck and head were placed and the hip was reduced.  C-arm showed that the trial filled the canal well and that the leg lengths were even.  Offset was similar to the opposite hip.    The hip was stable when the leg was externally rotated 90 degrees, the hip extended 20 degrees and an anterior pull applied to the neck.      The hip was dislocated and the trial femoral component removed.  The actual stem was impacted to the same position and orientation as the trial.  The trunion was cleaned and the chosen head was impacted in place.  The hip was reduced.  The hip space was filled with diluted, warm betadine.  C-arm showed good position of the implants and no fractures.    The wound was checked for bleeding and pulse lavaged with antibiotic irrigation.  The capsule was closed with 0 vicryl running suture.  The tissues were injected with 40 cc of bupivacaine.  The fascia was closed with running 0 vicryl, the subcutaneous layer with  2-0 vicryl and the skin closed with running 3-0 vicryl and prineo.  A sterile dressing was placed.  The patient was awakened, extubated and transferred to recovery in stable condition.    ITERATIONS   Neck Length (mm) Offset: Other Stable Ant? Leg Length difference  (mm) Stable Post?     0 no  perch +4      0 Patel neftali  Sl perch +3      0 Patel neftali duomob Very sl perch +3                              Electronically signed by LING LIU MD on 3/14/2024 at 11:47 AM

## 2024-03-18 ENCOUNTER — TELEPHONE (OUTPATIENT)
Dept: INPATIENT UNIT | Age: 64
End: 2024-03-18

## 2024-10-15 DIAGNOSIS — M25.552 LEFT HIP PAIN: Primary | ICD-10-CM

## 2024-10-16 ENCOUNTER — OFFICE VISIT (OUTPATIENT)
Age: 64
End: 2024-10-16
Payer: MEDICARE

## 2024-10-16 DIAGNOSIS — S32.592D PUBIC RAMUS FRACTURE, LEFT, WITH ROUTINE HEALING, SUBSEQUENT ENCOUNTER: Primary | ICD-10-CM

## 2024-10-16 PROCEDURE — 99213 OFFICE O/P EST LOW 20 MIN: CPT | Performed by: ORTHOPAEDIC SURGERY

## 2024-10-16 PROCEDURE — 1036F TOBACCO NON-USER: CPT | Performed by: ORTHOPAEDIC SURGERY

## 2024-10-16 PROCEDURE — 3017F COLORECTAL CA SCREEN DOC REV: CPT | Performed by: ORTHOPAEDIC SURGERY

## 2024-10-16 PROCEDURE — G8484 FLU IMMUNIZE NO ADMIN: HCPCS | Performed by: ORTHOPAEDIC SURGERY

## 2024-10-16 PROCEDURE — G8427 DOCREV CUR MEDS BY ELIG CLIN: HCPCS | Performed by: ORTHOPAEDIC SURGERY

## 2024-10-16 PROCEDURE — G8417 CALC BMI ABV UP PARAM F/U: HCPCS | Performed by: ORTHOPAEDIC SURGERY

## 2024-10-17 NOTE — PROGRESS NOTES
[facility]  [facility]  [address]  [facility] [id number] [phone]         Michael Coleman (: 1960) is a 63 y.o. male, patient, here for evaluation of the following chief complaint(s): Hip Pain (Left (fell)/DOI: )  .         Patient's PCP: Eriberto Duckworth MD     Patient's Last Appointment in this Department was on Visit date not found      Subjective:     Chief Complaint   Patient presents with    Hip Pain     Left (fell)  DOI:         Patient is a 63-year-old male status post right total hip arthroplasty 2024 who is doing well.  Patient had a fall  on his left hip.  Since then he has had pain in his left groin.  Is gotten a lot better over the last week or so.  We saw him about a month ago.  Felt he had an occult fracture of his pubis.  He had a left hip replacement done in .  He has done fine with the hip until this fall.  Again he is getting a lot better.  Much less pain.  Occasional twinges of pain.    Medications  Current Outpatient Medications   Medication Sig Dispense Refill    aspirin (ASPIRIN CHILDRENS) 81 MG chewable tablet Take 1 tablet by mouth in the morning and at bedtime 60 tablet 0    cephALEXin (KEFLEX) 500 MG capsule Take 1 capsule by mouth 4 times daily 28 capsule 0    vitamin D (CHOLECALCIFEROL) 25 MCG (1000 UT) TABS tablet Take 1 tablet by mouth three times a week      amLODIPine (NORVASC) 10 MG tablet Take 1 tablet by mouth daily      calcitRIOL (ROCALTROL) 0.5 MCG capsule Take 1 capsule by mouth three times a week      lidocaine-prilocaine (EMLA) 2.5-2.5 % cream Apply topically as needed       B Complex Vitamins (VITAMIN-B COMPLEX PO) Take 1 tablet by mouth daily       ibuprofen (ADVIL;MOTRIN) 400 MG tablet Take 1 tablet by mouth every 8 hours as needed for Pain 30 tablet 0    SEVELAMER CARBONATE PO Take 3 tablets by mouth 3 times daily (with meals)      iron sucrose

## 2025-04-01 PROBLEM — J43.2 CENTRILOBULAR EMPHYSEMA: Chronic | Status: ACTIVE | Noted: 2025-04-01

## 2025-04-01 PROBLEM — Z94.0 RENAL TRANSPLANT RECIPIENT: Chronic | Status: ACTIVE | Noted: 2025-04-01

## 2025-04-01 PROBLEM — J98.4 RESTRICTIVE LUNG DISEASE: Chronic | Status: ACTIVE | Noted: 2025-04-01

## 2025-04-01 PROBLEM — Z87.891 PERSONAL HISTORY OF NICOTINE DEPENDENCE: Chronic | Status: ACTIVE | Noted: 2025-04-01

## 2025-04-01 PROBLEM — J43.2 CENTRILOBULAR EMPHYSEMA: Status: ACTIVE | Noted: 2025-04-01

## 2025-04-01 PROBLEM — Z87.891 PERSONAL HISTORY OF NICOTINE DEPENDENCE: Status: ACTIVE | Noted: 2025-04-01

## 2025-04-01 PROBLEM — Z94.0 RENAL TRANSPLANT RECIPIENT: Status: ACTIVE | Noted: 2025-04-01

## 2025-04-01 PROBLEM — J98.4 RESTRICTIVE LUNG DISEASE: Status: ACTIVE | Noted: 2025-04-01

## 2025-04-01 NOTE — PROGRESS NOTES
Chief Complaint  Abnormal results of pulmonary function studies    Subjective    History of Present Illness {CC  Problem List  Visit Diagnosis   Encounters  Notes  Medications  Labs  Result Review Imaging  Media: 23}    Beni Fuchs presents to [facility] for:    History of Present Illness  Management of restrictive lung disease and emphysema.  This is a new patient referral.  He quit smoking in 2014.  1.5 pack/day for 40 years.  PFTs this year showing restrictive disease.  CT February 2025 showing emphysema and gallstones.    He reports he has had increased shortness of breath with exertion only for the last year.  It began after his renal transplant in June 2024.  Transplant performed at [facility] in [place].  He reports after his transplant he began to have significant fluid retention and weight gain which he attributes to side effects of his transplant medications.  He has gained 50 pounds since his transplant.  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  He has also had trouble with elevated uric acid levels which they have been working on.  He believes this fluid retention and weight gain is the biggest contributor to his shortness of breath.  He does not cough or wheeze.    He denies having any childhood history of lung disease.  No family history of lung disease.  No occupational risk factors.  No pets at home.  His PCP has given him Breo and an albuterol inhaler.  Both of these have been helpful.  Insurance would not cover the Breo.  They prescribe Stiolto which is covered.  He has just picked that up from the pharmacy but has not started it yet.      He is not on oxygen.    He denies having any allergy or sinus disease.    No reflux or swallowing issues.    He reports quite a bit of fatigue and trouble sleeping.  His sleep is not restorative.  He has never had a sleep study.         Current Outpatient  Medications:     albuterol sulfate  (90 Base) MCG/ACT inhaler, INHALE 2 PUFFS BY MOUTH EVERY 4 TO 6 HOURS AS NEEDED FOR SHORTNESS OF BREATH OR WHEEZING, Disp: , Rfl:     aspirin 81 MG EC tablet, Take 1 tablet by mouth Daily., Disp: , Rfl:     carvedilol (COREG) 12.5 MG tablet, Take 1 tablet by mouth Every 12 (Twelve) Hours., Disp: , Rfl:     Cholecalciferol (Vitamin D3) 1000 units capsule, Take  by mouth., Disp: , Rfl:     famotidine (PEPCID) 20 MG tablet, Take 1 tablet by mouth Daily., Disp: , Rfl:     febuxostat (ULORIC) 40 MG tablet, TAKE 1 TABLET BY MOUTH DAILY FOR CRITICALLY ELEVATED URIC ACID LEVEL, Disp: , Rfl:     Fluticasone Furoate-Vilanterol 100-25 MCG/ACT aerosol powder , inhale one puff by mouth every day, Disp: , Rfl:     Januvia 50 MG tablet, TAKE 1 TABLET BY MOUTH DAILY. STOP JARDIANCE AND CHANGE TO JANUVIA 50 MG BY MOUTH DAILY, Disp: , Rfl:     levothyroxine (SYNTHROID, LEVOTHROID) 50 MCG tablet, TAKE 1 TABLET BY MOUTH DAILY FOR DISORDER OF THYROID GLAND, Disp: , Rfl:     Maribavir (Livtencity) 200 MG tablet, Take  by mouth 2 (Two) Times a Day., Disp: , Rfl:     multivitamin with minerals (MULTIVITAMIN ADULT PO), Take 1 tablet by mouth Daily., Disp: , Rfl:     Mycophenolate Sodium (Mycophenolic Acid) 360 MG tablet delayed-release, Take  by mouth 2 (Two) Times a Day., Disp: , Rfl:     predniSONE (DELTASONE) 5 MG tablet, Take 2 tablets by mouth 2 (Two) Times a Day With Meals., Disp: , Rfl:     Stiolto Respimat 2.5-2.5 MCG/ACT aerosol solution inhaler, INHALE 2 PUFFS BY MOUTH EVERY 24 HOURS, Disp: , Rfl:     tacrolimus (PROGRAF) 0.5 MG capsule, Take 1 capsule by mouth Daily. AM, Disp: , Rfl:     tacrolimus (PROGRAF) 1 MG capsule, Take 1 capsule by mouth Daily. PM, Disp: , Rfl:     torsemide (DEMADEX) 10 MG tablet, TAKE 6 TABLETS BY MOUTH TWICE DAILY., Disp: , Rfl:     torsemide (DEMADEX) 20 MG tablet, Take 2 tablets by mouth Daily., Disp: , Rfl:     Social History     Socioeconomic History     "Marital status: Single   Tobacco Use    Smoking status: Former     Current packs/day: 1.00     Average packs/day: 1 pack/day for 40.0 years (40.0 ttl pk-yrs)     Types: Cigarettes    Smokeless tobacco: Never   Vaping Use    Vaping status: Never Used   Substance and Sexual Activity    Alcohol use: No    Drug use: Never    Sexual activity: Not Currently       Objective   Vital Signs:   /70   Pulse 68   Ht 180.3 cm (71\")   Wt 130 kg (286 lb)   SpO2 95% Comment: RA  BMI 39.89 kg/m²     Physical Exam  Constitutional:       Appearance: He is morbidly obese.   Eyes:      Comments: glasses   Cardiovascular:      Rate and Rhythm: Normal rate and regular rhythm.      Heart sounds: No murmur heard.  Pulmonary:      Effort: Pulmonary effort is normal.      Breath sounds: Normal breath sounds.   Musculoskeletal:      Right lower leg: Edema present.      Left lower leg: Edema present.   Neurological:      Mental Status: He is alert and oriented to person, place, and time.        Result Review :      My interpretation of the PFT : none    No results found for this or any previous visit.    IMAGING SCANNED (02/06/2025)     My interpretation of imaging:  mild emphysema, gallstones    My interpretation of labs: none      Assessment and Plan {CC Problem List  Visit Diagnosis  ROS  Review (Popup)  Health Maintenance  Quality  BestPractice  Medications  SmartSets  SnapShot Encounters  Media : 23}    Diagnoses and all orders for this visit:    1. Restrictive lung disease (Primary)  Comments:  Likely secondary to weight gain.  Okay to try Stiolto.  If not been official, we will try a different ICS/LABA.  Continue albuterol    2. Personal history of nicotine dependence  Comments:  Continue to avoid smoking.  Recommend annual low-dose lung cancer screening.  Due February 2026    3. Centrilobular emphysema  Comments:  Mild only on CT.  Monitor annually with low-dose lung cancer screening per guidelines.  Continue to " avoid smoking    4. Renal transplant recipient  Comments:  Keep follow-up with specialist.  50 pound weight gain since transplant certainly contributing to his breathing difficulties    5. Class 2 severe obesity due to excess calories with serious comorbidity and body mass index (BMI) of 39.0 to 39.9 in adult  Comments:  Contributes to shortness of breath and restrictive PFTs.  Education material provided    6. Hypersomnolent  Comments:  Nonrestorative sleep.  Sleep study ordered.  [facility] attached  Orders:  -     Polysomnography 4 or More Parameters; Future                                                          [place] Sleepiness Scale    Situation Chance of Dozing or Sleeping   Sitting and reading 2 - moderate chance of dosing or sleeping   Watching TV 2 - moderate chance of dosing or sleeping   Sitting inactive in a public place 0 - would never dose or sleep   Being a passenger in a motor vehicle for an hour or more 2 - moderate chance of dosing or sleeping   Lying down in the afternoon 3 - high chance of dosing or sleeping   Sitting and talking to someone 0 - would never dose or sleep   Sitting quietly after lunch (no alcohol) 3 - high chance of dosing or sleeping   Stopped for a few minutes in traffic while driving 0 - would never dose or sleep   Total score (add the scores up) 12       SLEEP RELATED SYMPTOMS:   Excessive daytime sleepiness, Nasal obstructions, Dry mouth, Uncontrollable need to sleep, Non-restorative sleep, Leg/body jerks during sleep, Nocturia (frequent urination at night), and Morning headaches     Body mass index is 39.89 kg/m². Educational material provided after visit summary about elevated BMI      CANDICE Malave  4/9/2025  09:18 CDT    Follow Up   Return in about 3 months (around 7/9/2025).    Patient was given instructions and counseling regarding his condition or for health maintenance advice. Please see specific information pulled into the AVS if appropriate.

## 2025-04-09 ENCOUNTER — OFFICE VISIT (OUTPATIENT)
Dept: PULMONOLOGY | Facility: CLINIC | Age: 65
End: 2025-04-09
Payer: MEDICARE

## 2025-04-09 VITALS
HEART RATE: 68 BPM | WEIGHT: 286 LBS | SYSTOLIC BLOOD PRESSURE: 130 MMHG | BODY MASS INDEX: 40.04 KG/M2 | DIASTOLIC BLOOD PRESSURE: 70 MMHG | HEIGHT: 71 IN | OXYGEN SATURATION: 95 %

## 2025-04-09 DIAGNOSIS — J43.2 CENTRILOBULAR EMPHYSEMA: Chronic | ICD-10-CM

## 2025-04-09 DIAGNOSIS — G47.10 HYPERSOMNOLENT: ICD-10-CM

## 2025-04-09 DIAGNOSIS — J98.4 RESTRICTIVE LUNG DISEASE: Primary | Chronic | ICD-10-CM

## 2025-04-09 DIAGNOSIS — E66.01 CLASS 2 SEVERE OBESITY DUE TO EXCESS CALORIES WITH SERIOUS COMORBIDITY AND BODY MASS INDEX (BMI) OF 39.0 TO 39.9 IN ADULT: ICD-10-CM

## 2025-04-09 DIAGNOSIS — E66.812 CLASS 2 SEVERE OBESITY DUE TO EXCESS CALORIES WITH SERIOUS COMORBIDITY AND BODY MASS INDEX (BMI) OF 39.0 TO 39.9 IN ADULT: ICD-10-CM

## 2025-04-09 DIAGNOSIS — Z94.0 RENAL TRANSPLANT RECIPIENT: Chronic | ICD-10-CM

## 2025-04-09 DIAGNOSIS — Z87.891 PERSONAL HISTORY OF NICOTINE DEPENDENCE: Chronic | ICD-10-CM

## 2025-04-09 RX ORDER — FAMOTIDINE 20 MG/1
1 TABLET, FILM COATED ORAL DAILY
COMMUNITY
Start: 2025-01-30

## 2025-04-09 RX ORDER — MARIBAVIR 200 MG/1
TABLET, COATED ORAL 2 TIMES DAILY
COMMUNITY

## 2025-04-09 RX ORDER — ALBUTEROL SULFATE 90 UG/1
INHALANT RESPIRATORY (INHALATION)
COMMUNITY
Start: 2025-02-20

## 2025-04-09 RX ORDER — TACROLIMUS 1 MG/1
1 CAPSULE ORAL DAILY
COMMUNITY

## 2025-04-09 RX ORDER — VITAMIN K2 90 MCG
CAPSULE ORAL
COMMUNITY

## 2025-04-09 RX ORDER — TACROLIMUS 0.5 MG/1
0.5 CAPSULE ORAL DAILY
COMMUNITY

## 2025-04-09 RX ORDER — PREDNISONE 5 MG/1
10 TABLET ORAL 2 TIMES DAILY WITH MEALS
COMMUNITY
Start: 2025-01-30

## 2025-04-09 RX ORDER — TIOTROPIUM BROMIDE AND OLODATEROL 3.124; 2.736 UG/1; UG/1
SPRAY, METERED RESPIRATORY (INHALATION)
COMMUNITY
Start: 2025-03-20

## 2025-04-09 RX ORDER — TORSEMIDE 20 MG/1
2 TABLET ORAL DAILY
COMMUNITY
Start: 2025-01-27

## 2025-04-09 RX ORDER — ASPIRIN 81 MG/1
81 TABLET ORAL DAILY
COMMUNITY

## 2025-04-09 RX ORDER — CARVEDILOL 12.5 MG/1
1 TABLET ORAL EVERY 12 HOURS SCHEDULED
COMMUNITY
Start: 2025-03-26

## 2025-04-09 RX ORDER — LEVOTHYROXINE SODIUM 50 UG/1
TABLET ORAL
COMMUNITY
Start: 2025-03-07

## 2025-04-09 RX ORDER — FLUTICASONE FUROATE AND VILANTEROL 100; 25 UG/1; UG/1
POWDER RESPIRATORY (INHALATION)
COMMUNITY
Start: 2025-03-12

## 2025-04-09 RX ORDER — MYCOPHENOLIC ACID 360 MG/1
TABLET, DELAYED RELEASE ORAL 2 TIMES DAILY
COMMUNITY

## 2025-04-09 RX ORDER — SITAGLIPTIN 50 MG/1
TABLET, FILM COATED ORAL
COMMUNITY
Start: 2025-02-17

## 2025-04-09 RX ORDER — TORSEMIDE 10 MG/1
TABLET ORAL
COMMUNITY
Start: 2025-03-10

## 2025-04-09 RX ORDER — FEBUXOSTAT 40 MG/1
TABLET, FILM COATED ORAL
COMMUNITY
Start: 2025-03-13

## 2025-06-24 NOTE — PROGRESS NOTES
"Chief Complaint  Restrictive lung disease    Subjective    History of Present Illness {CC  Problem List  Visit Diagnosis   Encounters  Notes  Medications  Labs  Result Review Imaging  Media: 23}    Beni Fuchs presents to [facility] for:    History of Present Illness  Management of restrictive lung disease and emphysema.  He quit smoking in 2014.  1.5 pack/day for 40 years.  PFTs this year showing restrictive disease.  CT February 2025 showing emphysema and gallstones.     He reports he has had increased shortness of breath with exertion only for the last year.  It began after his renal transplant in June 2024.  Transplant performed at [facility] in [place].  He reports after his transplant he began to have significant fluid retention and weight gain which he attributes to side effects of his transplant medications.  He has gained 50 pounds since his transplant.  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  He has also had trouble with elevated uric acid levels which they have been working on.  He believes this fluid retention and weight gain is the biggest contributor to his shortness of breath.  He does not cough or wheeze.    Since I saw him last they were trying to give him medications to treat gout.  He had a reaction to the oral and IV version of this medication.  Had significant swelling and pulmonary edema.  He did require Lasix during this incident.  He had an x-ray which showed some pulmonary edema and \"parenchymal scarring\".  His PCP wanted him to address this with us today.  Swelling has gone back down.  Breathing has improved with the diuretics.     He denies having any childhood history of lung disease.  No family history of lung disease.  No occupational risk factors.  No pets at home.      He is not on oxygen.      He has no allergy, sinus or reflux disease.      He was having trouble with sleep and " nonrestorative sleep.  I ordered a sleep study.  It has been scheduled at [facility] per his request for August 22.      His PCP has given him Breo and an albuterol inhaler.  Both of these have been helpful.  Insurance would not cover the Breo.  They prescribe Stiolto which is covered.  Last office visit he was encouraged to start this Stiolto.  He did start the Stiolto.  He was not certain if it was benefiting.  He was still using his albuterol nebulizer.  His PCP switched him to Trelegy 200 and DuoNebs.  He believes the Trelegy is helpful.  He has not used the DuoNebs yet..          Current Outpatient Medications:     albuterol sulfate  (90 Base) MCG/ACT inhaler, INHALE 2 PUFFS BY MOUTH EVERY 4 TO 6 HOURS AS NEEDED FOR SHORTNESS OF BREATH OR WHEEZING, Disp: , Rfl:     aspirin 81 MG EC tablet, Take 1 tablet by mouth Daily., Disp: , Rfl:     carvedilol (COREG) 12.5 MG tablet, Take 1 tablet by mouth Every 12 (Twelve) Hours., Disp: , Rfl:     Cholecalciferol (Vitamin D3) 1000 units capsule, Take  by mouth., Disp: , Rfl:     famotidine (PEPCID) 20 MG tablet, Take 1 tablet by mouth Daily., Disp: , Rfl:     Fluticasone Furoate-Vilanterol 100-25 MCG/ACT aerosol powder , inhale one puff by mouth every day, Disp: , Rfl:     HYDROcodone-acetaminophen (NORCO)  MG per tablet, TAKE 1 TABLET BY MOUTH EVERY 4 HOURS FOR 5 DAYS AS NEEDED FOR WOUND PAIN, Disp: , Rfl:     ipratropium-albuterol (DUO-NEB) 0.5-2.5 mg/3 ml nebulizer, USE 3 ML VIA NEBULIZER EVERY 6 HOURS FOR COPD, Disp: , Rfl:     Januvia 50 MG tablet, TAKE 1 TABLET BY MOUTH DAILY. STOP JARDIANCE AND CHANGE TO JANUVIA 50 MG BY MOUTH DAILY, Disp: , Rfl:     levothyroxine (SYNTHROID, LEVOTHROID) 50 MCG tablet, TAKE 1 TABLET BY MOUTH DAILY FOR DISORDER OF THYROID GLAND, Disp: , Rfl:     Maribavir (Livtencity) 200 MG tablet, Take  by mouth 2 (Two) Times a Day., Disp: , Rfl:     multivitamin with minerals (MULTIVITAMIN ADULT PO), Take 1 tablet by mouth Daily., Disp: ,  "Rfl:     Mycophenolate Sodium (Mycophenolic Acid) 360 MG tablet delayed-release, Take  by mouth 2 (Two) Times a Day., Disp: , Rfl:     predniSONE (DELTASONE) 5 MG tablet, Take 2 tablets by mouth 2 (Two) Times a Day With Meals., Disp: , Rfl:     tacrolimus (PROGRAF) 0.5 MG capsule, Take 1 capsule by mouth Daily. AM, Disp: , Rfl:     tacrolimus (PROGRAF) 1 MG capsule, Take 1 capsule by mouth Daily. PM, Disp: , Rfl:     torsemide (DEMADEX) 10 MG tablet, TAKE 6 TABLETS BY MOUTH TWICE DAILY., Disp: , Rfl:     torsemide (DEMADEX) 20 MG tablet, Take 2 tablets by mouth Daily., Disp: , Rfl:     Trelegy Ellipta 200-62.5-25 MCG/ACT inhaler, INHALE 1 PUFF BY MOUTH EVERY 24 HOURS FOR COPD, Disp: , Rfl:     Social History     Socioeconomic History    Marital status: Single   Tobacco Use    Smoking status: Former     Current packs/day: 1.00     Average packs/day: 1 pack/day for 40.0 years (40.0 ttl pk-yrs)     Types: Cigarettes     Passive exposure: Past    Smokeless tobacco: Never   Vaping Use    Vaping status: Never Used   Substance and Sexual Activity    Alcohol use: No    Drug use: Never    Sexual activity: Not Currently       Objective   Vital Signs:   /72   Pulse 68   Ht 180.3 cm (71\")   Wt 128 kg (282 lb)   SpO2 96% Comment: RA  BMI 39.33 kg/m²     Physical Exam  Constitutional:       Appearance: He is obese.   Eyes:      Comments: Glasses   Cardiovascular:      Rate and Rhythm: Normal rate and regular rhythm.      Heart sounds: No murmur heard.  Pulmonary:      Effort: Pulmonary effort is normal.      Breath sounds: Normal breath sounds.   Musculoskeletal:      Right lower leg: Edema present.      Left lower leg: Edema present.   Neurological:      Mental Status: He is alert and oriented to person, place, and time.        Result Review :      My interpretation of the PFT : none    No results found for this or any previous visit.      My interpretation of imaging:  none    My interpretation of labs: " none      Assessment and Plan {CC Problem List  Visit Diagnosis  ROS  Review (Popup)  Health Maintenance  Quality  BestPractice  Medications  SmartSets  SnapShot Encounters  Media : 23}    Diagnoses and all orders for this visit:    1. Centrilobular emphysema (Primary)  Comments:  Continue to avoid smoking.  Monitor PFTs  Orders:  -     CT Chest Without Contrast; Future    2. Restrictive lung disease  Comments:  Likely related to obesity and fluid retention.  Monitor.  Okay to continue Trelegy 200 daily.  Use DuoNebs sparingly given duplicate anticholinergic    3. Personal history of nicotine dependence  Comments:  Continue to avoid smoking.  Qualifies for low-dose lung cancer screening annually    4. Renal transplant recipient  Comments:  Biggest contributor to his shortness of breath.  He does have fluctuating fluid retention    5. Interstitial lung disease  Comments:  X-ray today questioning interstitial lung disease.  Will let him recover from current issues and we will order follow-up CT when he returns after sleep study  Orders:  -     CT Chest Without Contrast; Future        Body mass index is 39.33 kg/m². Educational material provided after visit summary about elevated BMI      Sandra Dean, CANDICE  7/9/2025  09:13 CDT    Follow Up   Return in about 3 months (around 10/9/2025) for ct.    Patient was given instructions and counseling regarding his condition or for health maintenance advice. Please see specific information pulled into the AVS if appropriate.

## 2025-06-25 ENCOUNTER — TRANSCRIBE ORDERS (OUTPATIENT)
Dept: ADMINISTRATIVE | Facility: HOSPITAL | Age: 65
End: 2025-06-25
Payer: MEDICARE

## 2025-06-25 DIAGNOSIS — D84.821 IMMUNODEFICIENCY DUE TO DRUGS: ICD-10-CM

## 2025-06-25 DIAGNOSIS — E11.9 TYPE 2 DIABETES MELLITUS WITHOUT COMPLICATION, UNSPECIFIED WHETHER LONG TERM INSULIN USE: ICD-10-CM

## 2025-06-25 DIAGNOSIS — R60.1 GENERALIZED EDEMA: Primary | ICD-10-CM

## 2025-06-25 DIAGNOSIS — R06.02 SHORTNESS OF BREATH: ICD-10-CM

## 2025-06-25 DIAGNOSIS — Z94.0 KIDNEY TRANSPLANT STATUS: ICD-10-CM

## 2025-06-25 DIAGNOSIS — R09.89 OTHER SPECIFIED SYMPTOMS AND SIGNS INVOLVING THE CIRCULATORY AND RESPIRATORY SYSTEMS: ICD-10-CM

## 2025-06-25 DIAGNOSIS — J81.1 CHRONIC PULMONARY EDEMA: ICD-10-CM

## 2025-06-25 DIAGNOSIS — Z79.899 IMMUNODEFICIENCY DUE TO DRUGS: ICD-10-CM

## 2025-07-09 ENCOUNTER — OFFICE VISIT (OUTPATIENT)
Dept: PULMONOLOGY | Facility: CLINIC | Age: 65
End: 2025-07-09
Payer: MEDICARE

## 2025-07-09 VITALS
HEIGHT: 71 IN | DIASTOLIC BLOOD PRESSURE: 72 MMHG | WEIGHT: 282 LBS | OXYGEN SATURATION: 96 % | BODY MASS INDEX: 39.48 KG/M2 | HEART RATE: 68 BPM | SYSTOLIC BLOOD PRESSURE: 126 MMHG

## 2025-07-09 DIAGNOSIS — J84.9 INTERSTITIAL LUNG DISEASE: ICD-10-CM

## 2025-07-09 DIAGNOSIS — J43.2 CENTRILOBULAR EMPHYSEMA: Primary | Chronic | ICD-10-CM

## 2025-07-09 DIAGNOSIS — Z94.0 RENAL TRANSPLANT RECIPIENT: Chronic | ICD-10-CM

## 2025-07-09 DIAGNOSIS — J98.4 RESTRICTIVE LUNG DISEASE: Chronic | ICD-10-CM

## 2025-07-09 DIAGNOSIS — Z87.891 PERSONAL HISTORY OF NICOTINE DEPENDENCE: Chronic | ICD-10-CM

## 2025-07-09 PROCEDURE — 1159F MED LIST DOCD IN RCRD: CPT | Performed by: NURSE PRACTITIONER

## 2025-07-09 PROCEDURE — 1160F RVW MEDS BY RX/DR IN RCRD: CPT | Performed by: NURSE PRACTITIONER

## 2025-07-09 PROCEDURE — 99214 OFFICE O/P EST MOD 30 MIN: CPT | Performed by: NURSE PRACTITIONER

## 2025-07-09 RX ORDER — FLUTICASONE FUROATE, UMECLIDINIUM BROMIDE AND VILANTEROL TRIFENATATE 200; 62.5; 25 UG/1; UG/1; UG/1
POWDER RESPIRATORY (INHALATION)
COMMUNITY
Start: 2025-07-04

## 2025-07-09 RX ORDER — HYDROCODONE BITARTRATE AND ACETAMINOPHEN 10; 325 MG/1; MG/1
TABLET ORAL
COMMUNITY
Start: 2025-07-03

## 2025-07-09 RX ORDER — IPRATROPIUM BROMIDE AND ALBUTEROL SULFATE 2.5; .5 MG/3ML; MG/3ML
SOLUTION RESPIRATORY (INHALATION)
COMMUNITY
Start: 2025-07-03

## 2025-08-04 ENCOUNTER — HOSPITAL ENCOUNTER (OUTPATIENT)
Dept: CARDIOLOGY | Facility: HOSPITAL | Age: 65
Discharge: HOME | End: 2025-08-04
Admitting: NURSE PRACTITIONER
Payer: MEDICARE

## 2025-08-04 VITALS
BODY MASS INDEX: 39.48 KG/M2 | WEIGHT: 282 LBS | HEIGHT: 71 IN | SYSTOLIC BLOOD PRESSURE: 126 MMHG | DIASTOLIC BLOOD PRESSURE: 72 MMHG

## 2025-08-04 DIAGNOSIS — Z94.0 KIDNEY TRANSPLANT STATUS: ICD-10-CM

## 2025-08-04 DIAGNOSIS — R09.89 OTHER SPECIFIED SYMPTOMS AND SIGNS INVOLVING THE CIRCULATORY AND RESPIRATORY SYSTEMS: ICD-10-CM

## 2025-08-04 DIAGNOSIS — J81.1 CHRONIC PULMONARY EDEMA: ICD-10-CM

## 2025-08-04 DIAGNOSIS — D84.821 IMMUNODEFICIENCY DUE TO DRUGS: ICD-10-CM

## 2025-08-04 DIAGNOSIS — R60.1 GENERALIZED EDEMA: ICD-10-CM

## 2025-08-04 DIAGNOSIS — E11.9 TYPE 2 DIABETES MELLITUS WITHOUT COMPLICATION, UNSPECIFIED WHETHER LONG TERM INSULIN USE: ICD-10-CM

## 2025-08-04 DIAGNOSIS — Z79.899 IMMUNODEFICIENCY DUE TO DRUGS: ICD-10-CM

## 2025-08-04 DIAGNOSIS — R06.02 SHORTNESS OF BREATH: ICD-10-CM

## 2025-08-04 PROCEDURE — 93306 TTE W/DOPPLER COMPLETE: CPT

## 2025-08-04 PROCEDURE — 25510000001 PERFLUTREN 6.52 MG/ML SUSPENSION: Performed by: NURSE PRACTITIONER

## 2025-08-04 RX ADMIN — PERFLUTREN 9.78 MG: 6.52 INJECTION, SUSPENSION INTRAVENOUS at 07:43

## 2025-08-07 LAB
AORTIC DIMENSIONLESS INDEX: 0.85 (DI)
AV MEAN PRESS GRAD SYS DOP V1V2: 4.1 MMHG
AV VMAX SYS DOP: 144 CM/SEC
BH CV ECHO MEAS - AO MAX PG: 8.3 MMHG
BH CV ECHO MEAS - AO ROOT DIAM: 3.5 CM
BH CV ECHO MEAS - AO V2 VTI: 34.1 CM
BH CV ECHO MEAS - AVA(I,D): 3.5 CM2
BH CV ECHO MEAS - EDV(CUBED): 173.9 ML
BH CV ECHO MEAS - EDV(MOD-SP2): 98.9 ML
BH CV ECHO MEAS - EDV(MOD-SP4): 92.1 ML
BH CV ECHO MEAS - EF(MOD-SP2): 64.5 %
BH CV ECHO MEAS - EF(MOD-SP4): 66.3 %
BH CV ECHO MEAS - ESV(CUBED): 19.8 ML
BH CV ECHO MEAS - ESV(MOD-SP2): 35.2 ML
BH CV ECHO MEAS - ESV(MOD-SP4): 31.1 ML
BH CV ECHO MEAS - FS: 51.6 %
BH CV ECHO MEAS - IVS/LVPW: 1.18 CM
BH CV ECHO MEAS - IVSD: 1.02 CM
BH CV ECHO MEAS - LA DIMENSION: 3.9 CM
BH CV ECHO MEAS - LAT PEAK E' VEL: 17.4 CM/SEC
BH CV ECHO MEAS - LV DIASTOLIC VOL/BSA (35-75): 37.7 CM2
BH CV ECHO MEAS - LV MASS(C)D: 202.8 GRAMS
BH CV ECHO MEAS - LV MAX PG: 5.1 MMHG
BH CV ECHO MEAS - LV MEAN PG: 2.6 MMHG
BH CV ECHO MEAS - LV SYSTOLIC VOL/BSA (12-30): 12.7 CM2
BH CV ECHO MEAS - LV V1 MAX: 113.4 CM/SEC
BH CV ECHO MEAS - LV V1 VTI: 28.8 CM
BH CV ECHO MEAS - LVIDD: 5.6 CM
BH CV ECHO MEAS - LVIDS: 2.7 CM
BH CV ECHO MEAS - LVOT AREA: 4.2 CM2
BH CV ECHO MEAS - LVOT DIAM: 2.3 CM
BH CV ECHO MEAS - LVPWD: 0.87 CM
BH CV ECHO MEAS - MED PEAK E' VEL: 13.9 CM/SEC
BH CV ECHO MEAS - MV A MAX VEL: 55.1 CM/SEC
BH CV ECHO MEAS - MV DEC SLOPE: 414 CM/SEC2
BH CV ECHO MEAS - MV DEC TIME: 0.22 SEC
BH CV ECHO MEAS - MV E MAX VEL: 90 CM/SEC
BH CV ECHO MEAS - MV E/A: 1.63
BH CV ECHO MEAS - RAP SYSTOLE: 3 MMHG
BH CV ECHO MEAS - RVSP: 26.1 MMHG
BH CV ECHO MEAS - SV(LVOT): 119.8 ML
BH CV ECHO MEAS - SV(MOD-SP2): 63.8 ML
BH CV ECHO MEAS - SV(MOD-SP4): 61 ML
BH CV ECHO MEAS - SVI(LVOT): 49.1 ML/M2
BH CV ECHO MEAS - SVI(MOD-SP2): 26.1 ML/M2
BH CV ECHO MEAS - SVI(MOD-SP4): 25 ML/M2
BH CV ECHO MEAS - TAPSE (>1.6): 1.05 CM
BH CV ECHO MEAS - TR MAX PG: 23.1 MMHG
BH CV ECHO MEAS - TR MAX VEL: 240.5 CM/SEC
BH CV ECHO MEASUREMENTS AVERAGE E/E' RATIO: 5.75
BH CV XLRA - RV BASE: 3.9 CM
BH CV XLRA - RV LENGTH: 6.6 CM
BH CV XLRA - RV MID: 3 CM
LEFT ATRIUM VOLUME INDEX: 29.1 ML/M2
LEFT ATRIUM VOLUME: 71 ML

## 2025-08-28 DIAGNOSIS — J84.9 INTERSTITIAL LUNG DISEASE: ICD-10-CM

## 2025-08-28 DIAGNOSIS — J43.2 CENTRILOBULAR EMPHYSEMA: Chronic | ICD-10-CM

## (undated) DEVICE — TOTAL TRAY, 16FR 10ML SIL FOLEY, URN: Brand: MEDLINE

## (undated) DEVICE — Z DISCONTINUED USE 2429233 DRESSING FOAM W10XL10CM 5 LAYR SELF ADH VERSATILE SAFETAC

## (undated) DEVICE — TROCAR: Brand: KII FIOS FIRST ENTRY

## (undated) DEVICE — ADHESIVE SKIN CLSR 0.7ML TOP DERMBND ADV

## (undated) DEVICE — MASK,OXYGEN,MED CONC,ADLT,7' TUB, UC: Brand: PENDING

## (undated) DEVICE — TUBE ET 8MM NSL ORAL BASIC CUF INTMED MURPHY EYE RADPQ MRK

## (undated) DEVICE — GLOVE SURG SZ 65 L12IN FNGR THK79MIL GRN LTX FREE

## (undated) DEVICE — DRESSING TRNSPAR W5XL4.5IN FLM SHT SEMIPERMEABLE WIND

## (undated) DEVICE — DRAPE,UTILITY,XL,4/PK,STERILE: Brand: MEDLINE

## (undated) DEVICE — GEL US 20GM NONIRRITATING OVERWRAPPED FILE PCH TRNSMIT

## (undated) DEVICE — DRESSING FOAM SELF ADH 20X10 CM ABSORBENT MEPILEX BORDER

## (undated) DEVICE — CUFF,BP,DISP,1 TUBE,ADULT,HP: Brand: MEDLINE

## (undated) DEVICE — ADAPTER,CATHETER/SYRINGE/LUER,STERILE: Brand: MEDLINE

## (undated) DEVICE — SOLUTION IV IRRIG POUR BRL 0.9% SODIUM CHL 2F7124

## (undated) DEVICE — NON-STERILE (14 X 30CM) COVER: Brand: CIV-FLEX™ TRANSDUCER COVER

## (undated) DEVICE — SUTURE PDS + SZ 2 0 L27IN ABSRB VLT L36MM CT 1 1 2 CIR PDP339H

## (undated) DEVICE — Device: Brand: DEFENDO AIR/WATER/SUCTION AND BIOPSY VALVE

## (undated) DEVICE — THE SINGLE USE ETRAP – POLYP TRAP IS USED FOR SUCTION RETRIEVAL OF ENDOSCOPICALLY REMOVED POLYPS.: Brand: ETRAP

## (undated) DEVICE — MASK O2 SAMPLING AD M LUER CAPNOXYGEN

## (undated) DEVICE — ADHESIVE SKIN CLOSURE WND 8.661X1.5 IN 22 CM LIQUIBAND SECUR

## (undated) DEVICE — DUAL CUT SAGITTAL BLADE

## (undated) DEVICE — Z INACTIVE USE 2535480 CLIP LIG M BLU TI HRT SHP WIRE HORZ 180 PER BX

## (undated) DEVICE — AMBU AURA-I U SIZE 4, DISPOSABLE LARYNGEAL MASK: Brand: AURA-I

## (undated) DEVICE — Y-TYPE TUR/BLADDER IRRIGATION SET, REGULATING CLAMP

## (undated) DEVICE — SINGLE-USE POLYPECTOMY SNARE: Brand: CAPTIVATOR II

## (undated) DEVICE — TUBE ET 7.5MM NSL ORAL BASIC CUF INTMED MURPHY EYE RADPQ

## (undated) DEVICE — STRIP SKIN CLSR W0.25XL4IN WHT SPUNBOUND FBR NYL HI ADH

## (undated) DEVICE — THE CHANNEL CLEANING BRUSH IS A NYLON FLEXI BRUSH ATTACHED TO A FLEXIBLE PLASTIC SHEATH DESIGNED TO SAFELY REMOVE DEBRIS FROM FLEXIBLE ENDOSCOPES.

## (undated) DEVICE — GLOVE SURG SZ 7 CRM LTX FREE POLYISOPRENE POLYMER BEAD ANTI

## (undated) DEVICE — ROYAL SILK SURGICAL GOWN, XXL: Brand: CONVERTORS

## (undated) DEVICE — Z DUP USE 2648250 IMMOBILIZER ORTH L SHLDR BILAT UNISX COT ADJ CLS EL OPN HND

## (undated) DEVICE — YANKAUER,BULB TIP WITH VENT: Brand: ARGYLE

## (undated) DEVICE — SUTURE VCRL SZ 2-0 L36IN ABSRB UD L36MM CT-1 1/2 CIR J945H

## (undated) DEVICE — SOLUTION IRRIG 3000ML 0.9% SOD CHL USP UROMATIC PLAS CONT

## (undated) DEVICE — PACK,UNIVERSAL,NO GOWNS: Brand: MEDLINE

## (undated) DEVICE — STAY.SAFE® CATHETER EXTENSION SET WITH LUER-LOCK, 18 INCH: Brand: STAY.SAFE®

## (undated) DEVICE — COVER US PRB W15XL120CM W/ GEL RUBBERBAND TAPE STRP FLD GEN

## (undated) DEVICE — 3M™ STERI-DRAPE™ INSTRUMENT POUCH 1018: Brand: STERI-DRAPE™

## (undated) DEVICE — SUTURE MCRYL SZ 4-0 L18IN ABSRB UD L19MM PS-2 3/8 CIR PRIM Y496G

## (undated) DEVICE — SUTURE VCRL SZ 3-0 L27IN ABSRB UD L26MM SH 1/2 CIR J416H

## (undated) DEVICE — GLOVE SURG SZ 85 L12IN FNGR THK79MIL GRN LTX FREE

## (undated) DEVICE — LARYNGOSCOPE BLDE MAC HNDL M SZ 35 ST CURAPLEX CURAVIEW LED

## (undated) DEVICE — MINOR CDS: Brand: MEDLINE INDUSTRIES, INC.

## (undated) DEVICE — NEEDLE ECHOGENIC 22GA L2IN INSUL W/ 30DEG BVL EXTN SET

## (undated) DEVICE — PACK ANT HIP CDS

## (undated) DEVICE — NDL SCLEROTHRPY INTERJECT 25G 4 240 CLR

## (undated) DEVICE — SUTURE PERMAHAND SZ 4-0 L12X30IN NONABSORBABLE BLK SILK A303H

## (undated) DEVICE — CURAVIEW LED LARYNSCP BLDE

## (undated) DEVICE — STAY.SAFE® CAP: Brand: STAY.SAFE®

## (undated) DEVICE — SUTURE PERMAHAND SZ 0 L30IN NONABSORBABLE BLK SILK BRAID A306H

## (undated) DEVICE — NEPTUNE E-SEP SMOKE EVACUATION PENCIL, COATED, 70MM BLADE, ROCKER SWITCH: Brand: NEPTUNE E-SEP

## (undated) DEVICE — GENERAL LAP CDS

## (undated) DEVICE — ENDOGATOR AUXILIARY WATER JET CONNECTOR: Brand: ENDOGATOR

## (undated) DEVICE — SNAR POLYP SENSATION MICRO OVL 13 240X40

## (undated) DEVICE — CLIP INT SM WIDE RED TI TRNSVRS GRV CHEVRON SHP W/ PRECIS

## (undated) DEVICE — TBG SMPL FLTR LINE NASL 02/C02 A/ BX/100

## (undated) DEVICE — ZINACTIVE USE 2539609 APPLICATOR MEDICATED 10.5 CC SOLUTION HI LT ORNG CHLORAPREP

## (undated) DEVICE — LARYNGOSCOPE VID MILLER 2 MTL BLADE M HNDL CURAPLEX

## (undated) DEVICE — TOWEL,OR,DSP,ST,BLUE,DLX,4/PK,20PK/CS: Brand: MEDLINE

## (undated) DEVICE — Device

## (undated) DEVICE — SENSR O2 OXIMAX FNGR A/ 18IN NONSTR

## (undated) DEVICE — SUTURE VCRL SZ 0 L27IN ABSRB UD L36MM CT-1 1/2 CIR J260H

## (undated) DEVICE — SURGICAL PROCEDURE PACK VASC LOURDES HOSP

## (undated) DEVICE — GLOVE SURG SZ 85 CRM LTX FREE POLYISOPRENE POLYMER BEAD ANTI

## (undated) DEVICE — CONNECTOR CATH TWO PART AD FOR PERITONEAL DLYS FLX NK

## (undated) DEVICE — BAND BAG 36" X 36": Brand: TIDI

## (undated) DEVICE — SUTURE PERMAHAND SZ 3-0 L30IN NONABSORBABLE BLK SILK BRAID A304H

## (undated) DEVICE — MASTISOL ADHESIVE LIQ 2/3ML

## (undated) DEVICE — SUTURE NONABSORBABLE MONOFILAMENT 6-0 BV-1 1X30 IN PROLENE 8709H

## (undated) DEVICE — SUTURE VCRL SZ 3-0 L27IN ABSRB UD L19MM PS-2 3/8 CIR PRIM J427H

## (undated) DEVICE — LIGHT SUCT UNTETHERED SCINTILLANT

## (undated) DEVICE — DECANTER VI VENT W/ VLV FOR ASEP TRNSF OF FLD

## (undated) DEVICE — SHIELD SURG COAX MULT TIP ORIFICE INTERPULSE

## (undated) DEVICE — Device: Brand: SPOT EX ENDOSCOPIC TATTOO

## (undated) DEVICE — SOLUTION IV 500ML 0.9% SOD CHL PH 5 INJ USP VIAFLX PLAS

## (undated) DEVICE — GLOVE SURG SZ 85 L12IN FNGR ORTHO 126MIL CRM LTX FREE